# Patient Record
Sex: MALE | Race: WHITE | Employment: FULL TIME | URBAN - METROPOLITAN AREA
[De-identification: names, ages, dates, MRNs, and addresses within clinical notes are randomized per-mention and may not be internally consistent; named-entity substitution may affect disease eponyms.]

---

## 2017-01-20 RX ORDER — LEVOTHYROXINE SODIUM 50 UG/1
50 TABLET ORAL
COMMUNITY
End: 2018-09-01 | Stop reason: SDUPTHER

## 2017-01-20 RX ORDER — ASPIRIN 81 MG/1
81 TABLET ORAL DAILY
Status: ON HOLD | COMMUNITY
End: 2017-01-25

## 2017-01-20 RX ORDER — LORATADINE 10 MG/1
10 TABLET ORAL DAILY
COMMUNITY
End: 2018-12-13 | Stop reason: SDUPTHER

## 2017-01-20 RX ORDER — METHOTREXATE 2.5 MG/1
25 TABLET ORAL
Status: ON HOLD | COMMUNITY
End: 2017-01-25

## 2017-01-20 RX ORDER — FOLIC ACID 1 MG/1
1 TABLET ORAL DAILY
COMMUNITY
End: 2018-05-03 | Stop reason: ALTCHOICE

## 2017-01-20 RX ORDER — RANITIDINE 150 MG/1
150 TABLET, FILM COATED ORAL
COMMUNITY
End: 2018-05-03 | Stop reason: ALTCHOICE

## 2017-01-20 RX ORDER — CHOLECALCIFEROL (VITAMIN D3) 125 MCG
1 CAPSULE ORAL DAILY
COMMUNITY
End: 2018-08-16 | Stop reason: ALTCHOICE

## 2017-01-20 NOTE — PERIOP NOTES
[facility]  Ambulatory Surgery Unit  Pre-operative Instructions    Surgery/Procedure Date  1/25            Tentative Arrival Time tbd      1. On the day of your surgery/procedure, please report to the Ambulatory Surgery Unit Registration Desk and sign in at your designated time. The Ambulatory Surgery Unit is located in [facility] on the [facility] side of the [facility] across from the [facility] building. Please have all of your health insurance cards and a photo ID. 2. You must have someone with you to drive you home, as you should not drive a car for 24 hours following anesthesia. Please make arrangements for a responsible adult friend or family member to stay with you for at least the first 24 hours after your surgery. 3. Do not have anything to eat or drink (including water, gum, mints, coffee, juice) after midnight 1/24. This may not apply to medications prescribed by your physician. (Please note below the special instructions with medications to take the morning of surgery, if applicable.)    4. We recommend you do not drink any alcoholic beverages for 24 hours before and after your surgery. 5. Stop all Aspirin, non-steroidal anti-inflammatory drugs (i.e. Advil, Aleve), vitamins, and supplements as directed by your surgeon's office. **If you are currently taking Plavix, Coumadin, or other blood-thinning agents, contact your surgeon for instructions. **    6. In an effort to help prevent surgical site infection, we ask that you shower with an anti-bacterial soap (i.e. Dial or Safeguard) for 3 days prior to and on the morning of surgery, using a fresh towel after each shower. (Please begin this process with fresh bed linens.) Do not apply any lotions, powders, or deodorants after the shower on the day of your procedure. If applicable, please do not shave the operative site for 48 hours prior to surgery. 7. Wear comfortable clothes. Wear glasses instead of contacts.  Do not bring any jewelry or money (other than copays or fees as instructed). Do not wear make-up, particularly mascara, the morning of your surgery. Do not wear nail polish, particularly if you are having foot /hand surgery. Wear your hair loose or down, no ponytails, buns, nancy pins or clips. All body piercings must be removed. 8. You should understand that if you do not follow these instructions your surgery may be cancelled. If your physical condition changes (i.e. fever, cold or flu) please contact your surgeon as soon as possible. 9. It is important that you be on time. If a situation occurs where you may be late, or if you have any questions or problems, please call [phone].    10. Your surgery time may be subject to change. You will receive a phone call the day prior to surgery to confirm your arrival time. Special Instructions:    MEDICATIONS TO TAKE THE MORNING OF SURGERY WITH A SIP OF WATER: levothyroxine and claritin      I understand a pre-operative phone call will be made to verify my surgery time. In the event that I am not available, I give permission for a message to be left on my answering service and/or with another person? yes    Reviewed by phone with pt. Verbalized understanding. Also left on VM for pt with instructions.      ___________________      ___________________      ________________  (Signature of Patient)          (Witness)                   (Date and Time)

## 2017-01-24 ENCOUNTER — ANESTHESIA EVENT (OUTPATIENT)
Dept: SURGERY | Age: 52
End: 2017-01-24
Payer: COMMERCIAL

## 2017-01-25 ENCOUNTER — ANESTHESIA (OUTPATIENT)
Dept: SURGERY | Age: 52
End: 2017-01-25
Payer: COMMERCIAL

## 2017-01-25 ENCOUNTER — HOSPITAL ENCOUNTER (OUTPATIENT)
Age: 52
Discharge: HOME | End: 2017-01-26
Attending: OTOLARYNGOLOGY | Admitting: OTOLARYNGOLOGY
Payer: COMMERCIAL

## 2017-01-25 PROCEDURE — 74011000250 HC RX REV CODE- 250: Performed by: OTOLARYNGOLOGY

## 2017-01-25 PROCEDURE — 74011000272 HC RX REV CODE- 272: Performed by: OTOLARYNGOLOGY

## 2017-01-25 PROCEDURE — 76030000001 HC AMB SURG OR TIME 1 TO 1.5: Performed by: OTOLARYNGOLOGY

## 2017-01-25 PROCEDURE — 77030012602 HC SPNG PTTY NEUR J&J -B: Performed by: OTOLARYNGOLOGY

## 2017-01-25 PROCEDURE — 77030021668 HC NEB PREFIL KT VYRM -A: Performed by: OTOLARYNGOLOGY

## 2017-01-25 PROCEDURE — 76210000057 HC AMBSU PH II REC 1 TO 1.5 HR: Performed by: OTOLARYNGOLOGY

## 2017-01-25 PROCEDURE — 77030006689 HC BLD OPHTH BVR BD -A: Performed by: OTOLARYNGOLOGY

## 2017-01-25 PROCEDURE — 76210000034 HC AMBSU PH I REC 0.5 TO 1 HR: Performed by: OTOLARYNGOLOGY

## 2017-01-25 PROCEDURE — 77030026438 HC STYL ET INTUB CARD -A: Performed by: NURSE ANESTHETIST, CERTIFIED REGISTERED

## 2017-01-25 PROCEDURE — 77030006907 HC BLD SNUS SHV MEDT -C: Performed by: OTOLARYNGOLOGY

## 2017-01-25 PROCEDURE — 74011250636 HC RX REV CODE- 250/636

## 2017-01-25 PROCEDURE — 74011250637 HC RX REV CODE- 250/637: Performed by: OTOLARYNGOLOGY

## 2017-01-25 PROCEDURE — 74011000250 HC RX REV CODE- 250

## 2017-01-25 PROCEDURE — 77030002888 HC SUT CHRMC J&J -A: Performed by: OTOLARYNGOLOGY

## 2017-01-25 PROCEDURE — 74011250637 HC RX REV CODE- 250/637

## 2017-01-25 PROCEDURE — 77030008684 HC TU ET CUF COVD -B: Performed by: NURSE ANESTHETIST, CERTIFIED REGISTERED

## 2017-01-25 PROCEDURE — 76060000062 HC AMB SURG ANES 1 TO 1.5 HR: Performed by: OTOLARYNGOLOGY

## 2017-01-25 PROCEDURE — 74011250636 HC RX REV CODE- 250/636: Performed by: OTOLARYNGOLOGY

## 2017-01-25 PROCEDURE — 77030020255 HC SOL INJ LR 1000ML BG: Performed by: OTOLARYNGOLOGY

## 2017-01-25 PROCEDURE — 74011250636 HC RX REV CODE- 250/636: Performed by: ANESTHESIOLOGY

## 2017-01-25 PROCEDURE — 77030018836 HC SOL IRR NACL ICUM -A: Performed by: OTOLARYNGOLOGY

## 2017-01-25 PROCEDURE — 77030002974 HC SUT PLN J&J -A: Performed by: OTOLARYNGOLOGY

## 2017-01-25 PROCEDURE — 77010033678 HC OXYGEN DAILY

## 2017-01-25 PROCEDURE — 77030002916 HC SUT ETHLN J&J -A: Performed by: OTOLARYNGOLOGY

## 2017-01-25 PROCEDURE — 74011000258 HC RX REV CODE- 258: Performed by: OTOLARYNGOLOGY

## 2017-01-25 PROCEDURE — 77030013079 HC BLNKT BAIR HGGR 3M -A: Performed by: NURSE ANESTHETIST, CERTIFIED REGISTERED

## 2017-01-25 PROCEDURE — 77030014006 HC SPNG HEMSTAT J&J -A: Performed by: OTOLARYNGOLOGY

## 2017-01-25 PROCEDURE — 77030008356 HC SPLNT NSL INT MEDT -B: Performed by: OTOLARYNGOLOGY

## 2017-01-25 RX ORDER — SODIUM CHLORIDE 0.9 % (FLUSH) 0.9 %
5-10 SYRINGE (ML) INJECTION EVERY 8 HOURS
Status: DISCONTINUED | OUTPATIENT
Start: 2017-01-25 | End: 2017-01-26 | Stop reason: HOSPADM

## 2017-01-25 RX ORDER — OXYCODONE HYDROCHLORIDE 5 MG/1
5 TABLET ORAL
Status: COMPLETED | OUTPATIENT
Start: 2017-01-25 | End: 2017-01-25

## 2017-01-25 RX ORDER — OXYCODONE AND ACETAMINOPHEN 5; 325 MG/1; MG/1
1 TABLET ORAL ONCE
Status: DISCONTINUED | OUTPATIENT
Start: 2017-01-25 | End: 2017-01-25 | Stop reason: HOSPADM

## 2017-01-25 RX ORDER — DEXTROSE MONOHYDRATE AND SODIUM CHLORIDE 5; .45 G/100ML; G/100ML
100 INJECTION, SOLUTION INTRAVENOUS CONTINUOUS
Status: DISCONTINUED | OUTPATIENT
Start: 2017-01-25 | End: 2017-01-26 | Stop reason: HOSPADM

## 2017-01-25 RX ORDER — ROCURONIUM BROMIDE 10 MG/ML
INJECTION, SOLUTION INTRAVENOUS AS NEEDED
Status: DISCONTINUED | OUTPATIENT
Start: 2017-01-25 | End: 2017-01-25 | Stop reason: HOSPADM

## 2017-01-25 RX ORDER — SCOLOPAMINE TRANSDERMAL SYSTEM 1 MG/1
PATCH, EXTENDED RELEASE TRANSDERMAL
Status: DISCONTINUED
Start: 2017-01-25 | End: 2017-01-26 | Stop reason: HOSPADM

## 2017-01-25 RX ORDER — LIDOCAINE HYDROCHLORIDE 20 MG/ML
INJECTION, SOLUTION EPIDURAL; INFILTRATION; INTRACAUDAL; PERINEURAL AS NEEDED
Status: DISCONTINUED | OUTPATIENT
Start: 2017-01-25 | End: 2017-01-25 | Stop reason: HOSPADM

## 2017-01-25 RX ORDER — LIDOCAINE HYDROCHLORIDE AND EPINEPHRINE 10; 10 MG/ML; UG/ML
1.5 INJECTION, SOLUTION INFILTRATION; PERINEURAL ONCE
Status: COMPLETED | OUTPATIENT
Start: 2017-01-25 | End: 2017-01-25

## 2017-01-25 RX ORDER — LIDOCAINE HYDROCHLORIDE 10 MG/ML
0.1 INJECTION, SOLUTION EPIDURAL; INFILTRATION; INTRACAUDAL; PERINEURAL AS NEEDED
Status: DISCONTINUED | OUTPATIENT
Start: 2017-01-25 | End: 2017-01-25 | Stop reason: HOSPADM

## 2017-01-25 RX ORDER — DEXAMETHASONE SODIUM PHOSPHATE 4 MG/ML
10 INJECTION, SOLUTION INTRA-ARTICULAR; INTRALESIONAL; INTRAMUSCULAR; INTRAVENOUS; SOFT TISSUE ONCE
Status: COMPLETED | OUTPATIENT
Start: 2017-01-25 | End: 2017-01-25

## 2017-01-25 RX ORDER — ONDANSETRON 2 MG/ML
INJECTION INTRAMUSCULAR; INTRAVENOUS AS NEEDED
Status: DISCONTINUED | OUTPATIENT
Start: 2017-01-25 | End: 2017-01-25 | Stop reason: HOSPADM

## 2017-01-25 RX ORDER — SCOLOPAMINE TRANSDERMAL SYSTEM 1 MG/1
1.5 PATCH, EXTENDED RELEASE TRANSDERMAL
Status: DISCONTINUED | OUTPATIENT
Start: 2017-01-25 | End: 2017-01-26 | Stop reason: HOSPADM

## 2017-01-25 RX ORDER — SUCCINYLCHOLINE CHLORIDE 20 MG/ML
INJECTION INTRAMUSCULAR; INTRAVENOUS AS NEEDED
Status: DISCONTINUED | OUTPATIENT
Start: 2017-01-25 | End: 2017-01-25 | Stop reason: HOSPADM

## 2017-01-25 RX ORDER — BACITRACIN ZINC 500 UNIT/G
OINTMENT (GRAM) TOPICAL ONCE
Status: COMPLETED | OUTPATIENT
Start: 2017-01-25 | End: 2017-01-25

## 2017-01-25 RX ORDER — NEOSTIGMINE METHYLSULFATE 1 MG/ML
INJECTION INTRAVENOUS AS NEEDED
Status: DISCONTINUED | OUTPATIENT
Start: 2017-01-25 | End: 2017-01-25 | Stop reason: HOSPADM

## 2017-01-25 RX ORDER — OXYMETAZOLINE HCL 0.05 %
3 SPRAY, NON-AEROSOL (ML) NASAL ONCE
Status: COMPLETED | OUTPATIENT
Start: 2017-01-25 | End: 2017-01-25

## 2017-01-25 RX ORDER — LORATADINE 10 MG/1
10 TABLET ORAL DAILY
Status: DISCONTINUED | OUTPATIENT
Start: 2017-01-25 | End: 2017-01-26 | Stop reason: HOSPADM

## 2017-01-25 RX ORDER — SODIUM CHLORIDE 0.9 % (FLUSH) 0.9 %
5-10 SYRINGE (ML) INJECTION AS NEEDED
Status: DISCONTINUED | OUTPATIENT
Start: 2017-01-25 | End: 2017-01-25 | Stop reason: HOSPADM

## 2017-01-25 RX ORDER — SODIUM CHLORIDE, SODIUM LACTATE, POTASSIUM CHLORIDE, CALCIUM CHLORIDE 600; 310; 30; 20 MG/100ML; MG/100ML; MG/100ML; MG/100ML
25 INJECTION, SOLUTION INTRAVENOUS CONTINUOUS
Status: DISCONTINUED | OUTPATIENT
Start: 2017-01-25 | End: 2017-01-25 | Stop reason: HOSPADM

## 2017-01-25 RX ORDER — SODIUM CHLORIDE 0.9 % (FLUSH) 0.9 %
5-10 SYRINGE (ML) INJECTION AS NEEDED
Status: DISCONTINUED | OUTPATIENT
Start: 2017-01-25 | End: 2017-01-26 | Stop reason: HOSPADM

## 2017-01-25 RX ORDER — LEVOTHYROXINE SODIUM 50 UG/1
50 TABLET ORAL
Status: DISCONTINUED | OUTPATIENT
Start: 2017-01-26 | End: 2017-01-26 | Stop reason: HOSPADM

## 2017-01-25 RX ORDER — MORPHINE SULFATE 10 MG/ML
2 INJECTION, SOLUTION INTRAMUSCULAR; INTRAVENOUS
Status: DISCONTINUED | OUTPATIENT
Start: 2017-01-25 | End: 2017-01-25 | Stop reason: HOSPADM

## 2017-01-25 RX ORDER — FENTANYL CITRATE 50 UG/ML
25 INJECTION, SOLUTION INTRAMUSCULAR; INTRAVENOUS
Status: DISCONTINUED | OUTPATIENT
Start: 2017-01-25 | End: 2017-01-25 | Stop reason: HOSPADM

## 2017-01-25 RX ORDER — MELATONIN
2000 DAILY
Status: DISCONTINUED | OUTPATIENT
Start: 2017-01-25 | End: 2017-01-26 | Stop reason: HOSPADM

## 2017-01-25 RX ORDER — OXYCODONE AND ACETAMINOPHEN 5; 325 MG/1; MG/1
1-2 TABLET ORAL
Status: DISCONTINUED | OUTPATIENT
Start: 2017-01-25 | End: 2017-01-26 | Stop reason: HOSPADM

## 2017-01-25 RX ORDER — CEPHALEXIN 250 MG/1
500 CAPSULE ORAL EVERY 6 HOURS
Status: DISCONTINUED | OUTPATIENT
Start: 2017-01-25 | End: 2017-01-26 | Stop reason: HOSPADM

## 2017-01-25 RX ORDER — OXYMETAZOLINE HCL 0.05 %
2 SPRAY, NON-AEROSOL (ML) NASAL ONCE
Status: COMPLETED | OUTPATIENT
Start: 2017-01-25 | End: 2017-01-25

## 2017-01-25 RX ORDER — ACETAMINOPHEN 10 MG/ML
INJECTION, SOLUTION INTRAVENOUS AS NEEDED
Status: DISCONTINUED | OUTPATIENT
Start: 2017-01-25 | End: 2017-01-25 | Stop reason: HOSPADM

## 2017-01-25 RX ORDER — MIDAZOLAM HYDROCHLORIDE 1 MG/ML
INJECTION, SOLUTION INTRAMUSCULAR; INTRAVENOUS AS NEEDED
Status: DISCONTINUED | OUTPATIENT
Start: 2017-01-25 | End: 2017-01-25 | Stop reason: HOSPADM

## 2017-01-25 RX ORDER — HYDROMORPHONE HYDROCHLORIDE 1 MG/ML
.2-.5 INJECTION, SOLUTION INTRAMUSCULAR; INTRAVENOUS; SUBCUTANEOUS ONCE
Status: DISCONTINUED | OUTPATIENT
Start: 2017-01-25 | End: 2017-01-25 | Stop reason: HOSPADM

## 2017-01-25 RX ORDER — SODIUM CHLORIDE 0.9 % (FLUSH) 0.9 %
5-10 SYRINGE (ML) INJECTION EVERY 8 HOURS
Status: DISCONTINUED | OUTPATIENT
Start: 2017-01-25 | End: 2017-01-25 | Stop reason: HOSPADM

## 2017-01-25 RX ORDER — FOLIC ACID 1 MG/1
1 TABLET ORAL DAILY
Status: DISCONTINUED | OUTPATIENT
Start: 2017-01-25 | End: 2017-01-26 | Stop reason: HOSPADM

## 2017-01-25 RX ORDER — FENTANYL CITRATE 50 UG/ML
INJECTION, SOLUTION INTRAMUSCULAR; INTRAVENOUS AS NEEDED
Status: DISCONTINUED | OUTPATIENT
Start: 2017-01-25 | End: 2017-01-25 | Stop reason: HOSPADM

## 2017-01-25 RX ORDER — GLYCOPYRROLATE 0.2 MG/ML
INJECTION INTRAMUSCULAR; INTRAVENOUS AS NEEDED
Status: DISCONTINUED | OUTPATIENT
Start: 2017-01-25 | End: 2017-01-25 | Stop reason: HOSPADM

## 2017-01-25 RX ORDER — DIPHENHYDRAMINE HYDROCHLORIDE 50 MG/ML
12.5 INJECTION, SOLUTION INTRAMUSCULAR; INTRAVENOUS AS NEEDED
Status: DISCONTINUED | OUTPATIENT
Start: 2017-01-25 | End: 2017-01-25 | Stop reason: HOSPADM

## 2017-01-25 RX ORDER — CEFAZOLIN SODIUM IN 0.9 % NACL 2 G/100 ML
2 PLASTIC BAG, INJECTION (ML) INTRAVENOUS ONCE
Status: COMPLETED | OUTPATIENT
Start: 2017-01-25 | End: 2017-01-25

## 2017-01-25 RX ORDER — PROPOFOL 10 MG/ML
INJECTION, EMULSION INTRAVENOUS AS NEEDED
Status: DISCONTINUED | OUTPATIENT
Start: 2017-01-25 | End: 2017-01-25 | Stop reason: HOSPADM

## 2017-01-25 RX ADMIN — ROCURONIUM BROMIDE 20 MG: 10 INJECTION, SOLUTION INTRAVENOUS at 09:42

## 2017-01-25 RX ADMIN — SALINE NASAL SPRAY 4 SPRAY: 1.5 SOLUTION NASAL at 22:24

## 2017-01-25 RX ADMIN — SODIUM CHLORIDE, SODIUM LACTATE, POTASSIUM CHLORIDE, AND CALCIUM CHLORIDE: 600; 310; 30; 20 INJECTION, SOLUTION INTRAVENOUS at 09:47

## 2017-01-25 RX ADMIN — CEPHALEXIN 500 MG: 250 CAPSULE ORAL at 23:49

## 2017-01-25 RX ADMIN — DEXTROSE MONOHYDRATE AND SODIUM CHLORIDE 100 ML/HR: 5; .45 INJECTION, SOLUTION INTRAVENOUS at 14:20

## 2017-01-25 RX ADMIN — CEPHALEXIN 500 MG: 250 CAPSULE ORAL at 14:21

## 2017-01-25 RX ADMIN — FOLIC ACID 1 MG: 1 TABLET ORAL at 14:21

## 2017-01-25 RX ADMIN — ROCURONIUM BROMIDE 5 MG: 10 INJECTION, SOLUTION INTRAVENOUS at 09:34

## 2017-01-25 RX ADMIN — SALINE NASAL SPRAY 4 SPRAY: 1.5 SOLUTION NASAL at 18:54

## 2017-01-25 RX ADMIN — NEOSTIGMINE METHYLSULFATE 3 MG: 1 INJECTION INTRAVENOUS at 10:31

## 2017-01-25 RX ADMIN — Medication 3 SPRAY: at 07:53

## 2017-01-25 RX ADMIN — FENTANYL CITRATE 100 MCG: 50 INJECTION, SOLUTION INTRAMUSCULAR; INTRAVENOUS at 09:34

## 2017-01-25 RX ADMIN — SALINE NASAL SPRAY 4 SPRAY: 1.5 SOLUTION NASAL at 14:30

## 2017-01-25 RX ADMIN — Medication 10 ML: at 22:24

## 2017-01-25 RX ADMIN — DEXAMETHASONE SODIUM PHOSPHATE 10 MG: 4 INJECTION, SOLUTION INTRAMUSCULAR; INTRAVENOUS at 07:54

## 2017-01-25 RX ADMIN — CEPHALEXIN 500 MG: 250 CAPSULE ORAL at 18:53

## 2017-01-25 RX ADMIN — ONDANSETRON 4 MG: 2 INJECTION INTRAMUSCULAR; INTRAVENOUS at 10:22

## 2017-01-25 RX ADMIN — OXYCODONE HYDROCHLORIDE 5 MG: 5 TABLET ORAL at 16:22

## 2017-01-25 RX ADMIN — SALINE NASAL SPRAY 4 SPRAY: 1.5 SOLUTION NASAL at 20:36

## 2017-01-25 RX ADMIN — SALINE NASAL SPRAY 4 SPRAY: 1.5 SOLUTION NASAL at 16:24

## 2017-01-25 RX ADMIN — GLYCOPYRROLATE 0.5 MG: 0.2 INJECTION INTRAMUSCULAR; INTRAVENOUS at 10:31

## 2017-01-25 RX ADMIN — SODIUM CHLORIDE, SODIUM LACTATE, POTASSIUM CHLORIDE, AND CALCIUM CHLORIDE 25 ML/HR: 600; 310; 30; 20 INJECTION, SOLUTION INTRAVENOUS at 07:51

## 2017-01-25 RX ADMIN — VITAMIN D, TAB 1000IU (100/BT) 2000 UNITS: 25 TAB at 14:21

## 2017-01-25 RX ADMIN — ACETAMINOPHEN 1000 MG: 10 INJECTION, SOLUTION INTRAVENOUS at 09:51

## 2017-01-25 RX ADMIN — MIDAZOLAM HYDROCHLORIDE 2 MG: 1 INJECTION, SOLUTION INTRAMUSCULAR; INTRAVENOUS at 09:29

## 2017-01-25 RX ADMIN — PROPOFOL 200 MG: 10 INJECTION, EMULSION INTRAVENOUS at 09:34

## 2017-01-25 RX ADMIN — Medication 10 ML: at 14:22

## 2017-01-25 RX ADMIN — LIDOCAINE HYDROCHLORIDE 60 MG: 20 INJECTION, SOLUTION EPIDURAL; INFILTRATION; INTRACAUDAL; PERINEURAL at 09:34

## 2017-01-25 RX ADMIN — FENTANYL CITRATE 50 MCG: 50 INJECTION, SOLUTION INTRAMUSCULAR; INTRAVENOUS at 10:59

## 2017-01-25 RX ADMIN — OXYCODONE HYDROCHLORIDE 5 MG: 5 TABLET ORAL at 20:36

## 2017-01-25 RX ADMIN — SUCCINYLCHOLINE CHLORIDE 140 MG: 20 INJECTION INTRAMUSCULAR; INTRAVENOUS at 09:34

## 2017-01-25 RX ADMIN — CEFAZOLIN 2 G: 10 INJECTION, POWDER, FOR SOLUTION INTRAVENOUS; PARENTERAL at 09:28

## 2017-01-25 NOTE — PERIOP NOTES
Arrival to PACU. Pt restless at intervals, confused. Frequent reassurance needed. 1056 no change. 1101 less restless. Eyes open to voice. 1111 changed from lynnette hugger gown to [employer] . Pt awake, drowsy, follows commands. Denies complaints. Encouraged Cough, deep breathing. 0 family at bedside. Pt taking po fluids without diff. Denies complaints. [address], Garth Klinefelter,  unable to take report at this time. She will return call. Pt resting quietly, family at bedside. 1215 resting quietly, family at bedside. Pt states his lower back is sore. Repositioned pt. HOB remains elevated. 1232 report to Trevor Moore RN on Gen surg   1300 transported to Gen Surg. On arrival to room, pt ambulated to bathroom with assist to void. Pt denies complaints. Face to face update to PATRIC Chavez RN at bedside. Pt assisted to bed, pt on blow by oxygen. Pt awake, alert and oriented x4.

## 2017-01-25 NOTE — BRIEF OP NOTE
BRIEF OPERATIVE NOTE    Date of Procedure: 1/25/2017   Preoperative Diagnosis: TURBINATE HYPERTROPHY   SEPTAL DEVIATION   Postoperative Diagnosis: TURBINATE HYPERTROPHY   SEPTAL DEVIATION     Procedure(s):  SEPTOPLASTY / TURBINATE REDUCTION   Surgeon(s) and Role:     * Jenelle Booker MD - Primary            Surgical Staff:  Circ-1: Shelley Mosqueda RN  Scrub Tech-1: Nitin Garcia.  Lizzeth  Event Time In   Incision Start 0951   Incision Close 1037     Anesthesia: General   Estimated Blood Loss: 25ml  Specimens: * No specimens in log *   Findings: as expected   Complications: none apparent  Implants: * No implants in log *

## 2017-01-25 NOTE — ANESTHESIA POSTPROCEDURE EVALUATION
Post-Anesthesia Evaluation and Assessment    Patient: Frederick Staton MRN: [id number]  SSN: [id number]    YOB: 1965  Age: 45 y.o. Sex: male       Cardiovascular Function/Vital Signs  Visit Vitals    BP (!) 145/99    Pulse 90    Temp 36.8 °C (98.2 °F)    Resp 17    Ht 5' 10\" (1.778 m)    Wt 103.9 kg (229 lb)    SpO2 96%    BMI 32.86 kg/m2       Patient is status post general, total IV anesthesia anesthesia for Procedure(s):  SEPTOPLASTY / TURBINATE REDUCTION . Nausea/Vomiting: None    Postoperative hydration reviewed and adequate. Pain:  Pain Scale 1: Numeric (0 - 10) (01/25/17 1211)  Pain Intensity 1: 0 (01/25/17 1211)   Managed    Neurological Status:   Neuro (WDL): Within Defined Limits (01/25/17 1141)  Neuro  Neurologic State: Alert (01/25/17 1319)  LUE Motor Response: Tingling;Numbness (chronic in left hand) (01/25/17 0738)   At baseline    Mental Status and Level of Consciousness: Arousable    Pulmonary Status:   O2 Device: Blow by oxygen (01/25/17 1211)   Adequate oxygenation and airway patent    Complications related to anesthesia: None    Post-anesthesia assessment completed.  No concerns    Signed By: Kj Antonio MD     January 25, 2017

## 2017-01-25 NOTE — OP NOTES
[facility]   [address]   OP NOTE       Name:  Edna Maher   MR#:  [id number]   :  1965   Account #:  [de-identified]    Surgery Date:  2017   Date of Adm:  2017       PREOPERATIVE DIAGNOSES    1. Nasal septal deviation. 2. Turbinate hypertrophy. POSTOPERATIVE DIAGNOSES    1. Nasal septal deviation. 2. Turbinate hypertrophy. PROCEDURES PERFORMED    1. Septoplasty. 2. Bilateral inferior turbinate submucosal resection with therapeutic   outfracture. SURGEON: Scooter Garcia. MD Jeremy    ESTIMATED BLOOD LOSS: 25 mL. COMPLICATIONS: None apparent. SPECIMENS REMOVED: None. ANESTHESIA:  GETA    INDICATIONS: The patient is a 70-year-old male with a long history of   chronic nasal airway obstruction which has been refractory to medical   therapy. Additionally, the patient is known to have sleep apnea and   has difficulty using his CPAP device due to nasal airway obstruction. The preoperative fiberoptic examination of the nasal cavity showed a   markedly deviated and obstructive septal deflection to the left side with   hypertrophy of the right inferior turbinate. Preoperatively, the   alternatives, potential benefits, and possible risks of the procedure   were explained to the patient and his family, who understood and   requested to proceed. DESCRIPTION OF PROCEDURE: The patient received 0.25%   oxymetazoline in the pre-anesthesia holding area. The patient was   brought to the operating room, placed supine on the operating table,   and following the smooth induction of general endotracheal tube   anesthesia, the table was turned 90 degrees and the patient was   positioned for operation. Xylocaine 1% with 1:100,000 epinephrine was   injected in a submucoperichondrial and submucoperiosteal fashion   from anterior to posterior. Further oxymetazoline on neurosurgical   cottonoids was placed intranasally.  A routine Betadine prep and drape   was completed. A 6400 Turner blade was used to initiate a left hemitransfixion incision. A submucoperichondrial and submucoperiosteal elevation of   membranes was carried out from anterior to posterior. The   osteocartilaginous junction was identified and  with a caudal   calibrated elevator. The deformed and obstructing portions of posterior   osseous septum were removed with a [place]-Amor bone punch. A spur arising from the maxillary crest was removed with a 4 mm   osteotome. A 2 mm strip of anterior superior quadrangular cartilage   was removed to allow a swinging door return to midline of the planar   portions of quadrangular cartilage. Satisfactory reduction of the nasal   septal constituents was observed. Attention was turned to turbinate   reduction. Using the StraightShot handpiece with a 2 mm turbinate blade   attached, a submucosal resection of the right and left inferior turbinate   soft tissues was carried out. After adequate soft tissue had been   resected, a Boies elevator was used to perform a therapeutic   outfracture of the inferior turbinate on each side. A satisfactory nasal   airway was subsequently observed and attention was turned to   closure. Interrupted 4-0 chromic was used to approximate the margins of the   left hemitransfixion incision. A running 4-0 plain gut suture was used to   approximate septal membranes to the midline. Lindo ventilated splints   were placed and secured with a nylon mattress suture. Bacitracin-  coated Gelfoam was placed on each side of the nasal cavity to further   support the inferior turbinates in a lateralized position. With application   of a sterile nasal dressing, the patient's procedure was concluded. The   patient was aroused from general anesthesia, extubated in the   operating room, and transferred to the recovery area in satisfactory   condition.         Cami Teague MD      [address] [place] [address] / [facility]   D: 01/25/2017   11:00   T:  01/25/2017   13:41   Job #:  [id number]

## 2017-01-25 NOTE — PROGRESS NOTES
End of Shift Nursing Note    Bedside shift change report given to Johnny Donahue (oncoming nurse) by John Andrew RN (offgoing nurse). Report included the following information SBAR and Kardex. Zone Phone:       Significant changes during shift:    none   Non-emergent issues for physician to address:   none     Number times ambulated in hallway past shift: 0      Number of times OOB to chair past shift: 2    POD #:      Vital Signs:    Temp: 98.2 °F (36.8 °C)     Pulse (Heart Rate): 90     BP: (!) 145/99     Resp Rate: 17     O2 Sat (%): 96 %    Lines & Drains:     Urinary Catheter? NO       NG tube [] in [] removed [x] not applicable   Drains [] in [] removed [x] not applicable     Skin Integrity:      Wounds: no   Dressings Present: no    Wound Concerns: no      GI:    Current diet:  DIET REGULAR    Nausea: NO  Vomiting: NO  Bowel Sounds: YES  Flatus: NO  Last Bowel Movement: yesterday   Appearance:     Respiratory:  Supplemental O2: yes    Device: face tent   via 4 Liters/min     Incentive Spirometer: NO  Volume:   Coughing and Deep Breathing: YES  Oral Care: NO  Understanding (patient/family education): YES   Getting out of bed: YES  Head of bed elevation: YES    Patient Safety:    Falls Score: 1  Mobility Score: 1  Bed Alarm On? NO  Sitter? NO      Opportunity for questions and clarification was given to oncoming nurse. Patient bed is in low position, side rails are up x 2, door & observation blinds open as needed, call bell within reach and patient not in distress.     Tia Fregoso RN

## 2017-01-25 NOTE — PERIOP NOTES
TRANSFER - OUT REPORT:    Verbal report given to PATRIC Ceja RN on [employer]  being transferred to gen surg(unit) for routine progression of care       Report consisted of patients Situation, Background, Assessment and   Recommendations(SBAR). Information from the following report(s) SBAR, OR Summary, Procedure Summary, Intake/Output and MAR was reviewed with the receiving nurse. Opportunity for questions and clarification was provided.       Patient transported with:   O2 @ blow by layos with rn

## 2017-01-25 NOTE — PERIOP NOTES
Go Decree  1965  [id number]    Situation:  Verbal report given from: timothy caldwell and esperanza kaur   Procedure: Procedure(s):  SEPTOPLASTY / TURBINATE REDUCTION     Background:    Preoperative diagnosis: TURBINATE HYPERTROPHY   SEPTAL DEVIATION     Postoperative diagnosis: [address]     :  Dr. Sky Duong    Assistant(s): Circ-1: Lora Pappas RN  Scrub Tech-1: Heidi Moreau    Specimens: * No specimens in log *    Assessment:  Intra-procedure medications         Anesthesia gave intra-procedure sedation and medications, see anesthesia flow sheet     Intravenous fluids: LR@ KVO     Vital signs stable       Recommendation:    Permission to share finding with family or friend yes (given preop)

## 2017-01-25 NOTE — DISCHARGE INSTRUCTIONS
[facility] ASSOC[facility]ATESBEE. Anastasia De La Torre M.D., Ph.D., F.A.C.S. Fabian Meyersu, [address] [facility]  [phone]    POST OPERATIVE INSTRUCTIONS-SINUS SURGERY/SEPTOPLASTY    The following instructions are designed to help you recover from surgery as easily as possible. Taking care of yourself can prevent complications. It is very important that you read this sheet often and follow the instructions carefully while you are at home. Your doctor or nurse will be happy to answer any questions. 1. DIET:  You may resume your normal diet in 24 hours. We suggest nothing heavy or greasy and avoid dairy products the first 24 hours. It is important to remember that good overall diet and health promotes healing. 2.  ACTIVITY RESTRICTIONS:  The following guidelines should be followed until your doctor tells you otherwise:   Do not blow your nose, sniff and if you have to sneeze or cough-do with mouth open   Do not lift anything over five pounds.  Do not bend over. Avoid doing things like tying your shoes or picking up things off the floor.  Do not do heavy exercise or play contact sports   Do not strain for a bowel movement. If you are constipated, take a stool softener or a gentle laxative.  Go back to work or school in one week. 3.  HOW TO TAKE CARE OF YOUR NOSE AND SINUSES:  Take the antibiotic prescribed by your doctor. Call if you have any problems or questions. We expect bloody drainage from your nose. You are to change the dressing below your nose as needed and expect to do this 10 to 12 times the first day. We want the drainage to come forward and not down your throat. We suggest you rest and sleep elevated on several pillows on your side. You will have less drainage each day and the swelling will reduce in several days. Call the office if you have bloody saturated gauze more than once an hour.     Clean the nasal openings twice daily with a Q-tip soaked in hydrogen peroxide until clear of all crusts. Take the pain medication prescribed by your doctor as needed for discomfort. No Aspirin, Motrin, Alleve, Advil or similar products for 3 weeks. You make take Tylenol (Acetaminophen) when you no longer need prescribed medication. Do not take Tylenol on top of pain medication because Tylenol is in the pain medication. Avoid smoke, dust, fumes or anything else that might irritate the nose. Protect yourself from any unexpected injury to your nose or face, such as being hit by small children or a restless bedmate. You may find that a cool mist room humidifier is helpful in decreasing the amount of dryness in your nose and mouth. After surgery you should use a nasal saline spray such as Ayr or Simply Saline. Use 4 sprays in each nostril every two hours while awake to help prevent crusts from forming in your nose. 4. RETURN APPOINTMENT:   You need to make a follow-up appointment with your doctor in 5 days. At this time your nose will be gently and carefully examined and your packing will be removed. 5. NOTIFY YOUR DOCTOR IF YOU HAVE:    A sudden increase in the amount of bleeding from the nose   A fever above 101 degrees F, which persists despite increasing the amount of fluids you take. A person with a fever should try to drink one cup of water each waking hour.  Persistent sharp pain that is not relieved by the pain medication you receive.  Increased swelling or redness of your nose.     If you have any questions or concerns following your surgery do not hesitate to contact our office at [phone]

## 2017-01-25 NOTE — ANESTHESIA PREPROCEDURE EVALUATION
Anesthetic History   No history of anesthetic complications            Review of Systems / Medical History  Patient summary reviewed, nursing notes reviewed and pertinent labs reviewed    Pulmonary        Sleep apnea: CPAP           Neuro/Psych   Within defined limits           Cardiovascular  Within defined limits                Exercise tolerance: >4 METS  Comments: BP runs high; no med   GI/Hepatic/Renal  Within defined limits              Endo/Other      Hypothyroidism  Arthritis (Rheumatoid)     Other Findings   Comments: DDD, lumbar         Physical Exam    Airway  Mallampati: I  TM Distance: > 6 cm  Neck ROM: normal range of motion   Mouth opening: Normal     Cardiovascular    Rhythm: regular  Rate: normal      Pertinent negatives: No murmur   Dental  No notable dental hx       Pulmonary  Breath sounds clear to auscultation               Abdominal  GI exam deferred       Other Findings            Anesthetic Plan    ASA: 2  Anesthesia type: general and total IV anesthesia          Induction: Intravenous  Anesthetic plan and risks discussed with: Patient      Scopolamine patch for h/o severe motion sickness

## 2017-01-25 NOTE — IP AVS SNAPSHOT
[address] [facility] 
[phone] Patient: Ebenezer Light MRN: [id number] ITY:[id number] You are allergic to the following Allergen Reactions Sulfa (Sulfonamide Antibiotics) Nausea and Vomiting Recent Documentation Height Weight BMI Smoking Status 1.778 m 103.9 kg 32.86 kg/m2 Never Smoker Emergency Contacts Name Discharge Info Relation Home Work Mobile May Charles  Spouse [3]   [phone] About your hospitalization You were admitted on:  January 25, 2017 You last received care in the:  [facility] 2 GENERAL SURGERY You were discharged on:  January 26, 2017 Unit phone number:  [phone] Why you were hospitalized Your primary diagnosis was:  Not on File Providers Seen During Your Hospitalizations Provider Role Specialty Primary office phone Bren Goyal MD Attending Provider Otolaryngology [phone] Your Primary Care Physician (PCP) Primary Care Physician Office Phone Office Fax Micaela [place] [phone] Follow-up Information Follow up With Details Comments Contact Info MD Vladimir Diaz [address] [place] [facility] 
[phone] Current Discharge Medication List  
  
CONTINUE these medications which have NOT CHANGED Dose & Instructions Dispensing Information Comments Morning Noon Evening Bedtime CLARITIN 10 mg tablet Generic drug:  loratadine Your next dose is: Today, Tomorrow Other:  _________ Dose:  10 mg Take 10 mg by mouth daily. Refills:  0  
     
   
   
   
  
 folic acid 1 mg tablet Commonly known as:  Google Your next dose is: Today, Tomorrow Other:  _________ Dose:  1 mg Take 1 mg by mouth daily. Refills:  0 levothyroxine 50 mcg tablet Commonly known as:  SYNTHROID Your next dose is: Today, Tomorrow Other:  _________ Dose:  50 mcg Take 50 mcg by mouth Daily (before breakfast). Indications: hypothyroidism Refills:  0  
     
   
   
   
  
 VITAMIN D3 2,000 unit Tab Generic drug:  cholecalciferol (vitamin D3) Your next dose is: Today, Tomorrow Other:  _________ Dose:  1 Tab Take 1 Tab by mouth daily. Refills:  0  
     
   
   
   
  
 ZANTAC 150 mg tablet Generic drug:  raNITIdine Your next dose is: Today, Tomorrow Other:  _________ Dose:  150 mg Take 150 mg by mouth Every Friday. Refills:  0 ASK your doctor about these medications Dose & Instructions Dispensing Information Comments Morning Noon Evening Bedtime  
 aspirin delayed-release 81 mg tablet Your next dose is: Today, Tomorrow Other:  _________ Dose:  81 mg Take 81 mg by mouth daily. Refills:  0  
     
   
   
   
  
 methotrexate 2.5 mg tablet Commonly known as:  Pieter Jarred Your next dose is: Today, Tomorrow Other:  _________ Dose:  25 mg Take 25 mg by mouth Every Friday. Refills:  0 Discharge Instructions [facility] Manzano. Sebastian Gillis M.D., Ph.D., F.A.C.S. Otolaryngology [address] [place] [address] 2[address] [facility], [address] [place] [address] 
[phone] POST OPERATIVE INSTRUCTIONS-SINUS SURGERY/SEPTOPLASTY The following instructions are designed to help you recover from surgery as easily as possible. Taking care of yourself can prevent complications. It is very important that you read this sheet often and follow the instructions carefully while you are at home. Your doctor or nurse will be happy to answer any questions. 1.  DIET: 
 You may resume your normal diet in 24 hours. We suggest nothing heavy or greasy and avoid dairy products the first 24 hours. It is important to remember that good overall diet and health promotes healing. 2.  ACTIVITY RESTRICTIONS: 
The following guidelines should be followed until your doctor tells you otherwise: ? Do not blow your nose, sniff and if you have to sneeze or cough-do with mouth open ? Do not lift anything over five pounds. ? Do not bend over. Avoid doing things like tying your shoes or picking up things off the floor. ? Do not do heavy exercise or play contact sports ? Do not strain for a bowel movement. If you are constipated, take a stool softener or a gentle laxative. ? Go back to work or school in one week. 3.  HOW TO TAKE CARE OF YOUR NOSE AND SINUSES: 
Take the antibiotic prescribed by your doctor. Call if you have any problems or questions. We expect bloody drainage from your nose. You are to change the dressing below your nose as needed and expect to do this 10 to 12 times the first day. We want the drainage to come forward and not down your throat. We suggest you rest and sleep elevated on several pillows on your side. You will have less drainage each day and the swelling will reduce in several days. Call the office if you have bloody saturated gauze more than once an hour. Clean the nasal openings twice daily with a Q-tip soaked in hydrogen peroxide until clear of all crusts. Take the pain medication prescribed by your doctor as needed for discomfort. No Aspirin, Motrin, Alleve, Advil or similar products for 3 weeks. You make take Tylenol (Acetaminophen) when you no longer need prescribed medication. Do not take Tylenol on top of pain medication because Tylenol is in the pain medication. Avoid smoke, dust, fumes or anything else that might irritate the nose.  
 
Protect yourself from any unexpected injury to your nose or face, such as being hit by small children or a restless bedmate. You may find that a cool mist room humidifier is helpful in decreasing the amount of dryness in your nose and mouth. After surgery you should use a nasal saline spray such as Ayr or Simply Saline. Use 4 sprays in each nostril every two hours while awake to help prevent crusts from forming in your nose. 4. RETURN APPOINTMENT:  
You need to make a follow-up appointment with your doctor in 5 days. At this time your nose will be gently and carefully examined and your packing will be removed. 5. NOTIFY YOUR DOCTOR IF YOU HAVE:  
? A sudden increase in the amount of bleeding from the nose ? A fever above 101 degrees F, which persists despite increasing the amount of fluids you take. A person with a fever should try to drink one cup of water each waking hour. ? Persistent sharp pain that is not relieved by the pain medication you receive. ? Increased swelling or redness of your nose. If you have any questions or concerns following your surgery do not hesitate to contact our office at [phone] Discharge Orders None Introducing [facility]! Alejandro Mera introduces [employer] patient portal. Now you can access parts of your medical record, email your doctor's office, and request medication refills online. 1. In your internet browser, go to [email or web address] 2. Click on the First Time User? Click Here link in the Sign In box. You will see the New Member Sign Up page. 3. Enter your [employer] Access Code exactly as it appears below. You will not need to use this code after youve completed the sign-up process. If you do not sign up before the expiration date, you must request a new code. · [employer] Access Code: [email or web address] Expires: 4/12/2017 10:31 AM 
 
4. Enter the last four digits of your Social Security Number (xxxx) and Date of Birth (mm/dd/yyyy) as indicated and click Submit.  You will be taken to the next sign-up page. 5. Create a [employer] ID. This will be your [employer] login ID and cannot be changed, so think of one that is secure and easy to remember. 6. Create a [employer] password. You can change your password at any time. 7. Enter your Password Reset Question and Answer. This can be used at a later time if you forget your password. 8. Enter your e-mail address. You will receive e-mail notification when new information is available in [address] 9. Click Sign Up. You can now view and download portions of your medical record. 10. Click the Download Summary menu link to download a portable copy of your medical information. If you have questions, please visit the Frequently Asked Questions section of the [employer] website. Remember, [employer] is NOT to be used for urgent needs. For medical emergencies, dial 911. Now available from your iPhone and Android! General Information Please provide this summary of care documentation to your next provider. Patient Signature:  ____________________________________________________________ Date:  ____________________________________________________________  
  
Qian Car Provider Signature:  ____________________________________________________________ Date:  ____________________________________________________________

## 2017-01-26 VITALS
DIASTOLIC BLOOD PRESSURE: 83 MMHG | OXYGEN SATURATION: 94 % | TEMPERATURE: 97.8 F | BODY MASS INDEX: 32.78 KG/M2 | SYSTOLIC BLOOD PRESSURE: 145 MMHG | HEIGHT: 70 IN | WEIGHT: 229 LBS | RESPIRATION RATE: 16 BRPM | HEART RATE: 79 BPM

## 2017-01-26 PROCEDURE — 74011000258 HC RX REV CODE- 258: Performed by: OTOLARYNGOLOGY

## 2017-01-26 PROCEDURE — 74011250637 HC RX REV CODE- 250/637: Performed by: OTOLARYNGOLOGY

## 2017-01-26 RX ADMIN — LORATADINE 10 MG: 10 TABLET ORAL at 08:06

## 2017-01-26 RX ADMIN — SALINE NASAL SPRAY 4 SPRAY: 1.5 SOLUTION NASAL at 06:05

## 2017-01-26 RX ADMIN — CEPHALEXIN 500 MG: 250 CAPSULE ORAL at 06:11

## 2017-01-26 RX ADMIN — DEXTROSE MONOHYDRATE AND SODIUM CHLORIDE 100 ML/HR: 5; .45 INJECTION, SOLUTION INTRAVENOUS at 00:44

## 2017-01-26 RX ADMIN — LEVOTHYROXINE SODIUM 50 MCG: 50 TABLET ORAL at 08:06

## 2017-01-26 NOTE — PROGRESS NOTES
End of Shift Nursing Note    Bedside shift change report given to Geovani (oncoming nurse) by Joey Sapp (offgoing nurse). Report included the following information SBAR. Zone Phone:       Significant changes during shift:    none   Non-emergent issues for physician to address:   none     Number times ambulated in hallway past shift:  0     Number of times OOB to chair past shift: 1    POD #: 1     Vital Signs:    Temp: 99.3 °F (37.4 °C)     Pulse (Heart Rate): (!) 109     BP: 125/80     Resp Rate: 18     O2 Sat (%): 92 %    Lines & Drains:     Urinary Catheter? NO   Central Line? NO   PICC Line? NO       NG tube [] in [] removed [x] not applicable   Drains [] in [] removed [x] not applicable     Skin Integrity:      Wounds: per nose  Dressings Present: yes  Wound Concerns: no      GI:    Current diet:  DIET REGULAR    Nausea: NO  Vomiting: NO  Bowel Sounds: YES  Flatus: YES  Last Bowel Movement: yesterday   Appearance:     Respiratory:  Supplemental O2: YES      Device: mask   via 4 Liters/min     Incentive Spirometer: NO  Volume:   Coughing and Deep Breathing: YES  Oral Care: YES  Understanding (patient/family education): YES   Getting out of bed: YES  Head of bed elevation: YES    Patient Safety:    Falls Score: 1  Mobility Score: 4  Bed Alarm On? NO  Sitter? YES      Opportunity for questions and clarification was given to oncoming nurse. Patient bed is in low position, side rails are up x 2, door & observation blinds open as needed, call bell within reach and patient not in distress.     Carlos Johnson

## 2017-07-20 ENCOUNTER — TELEPHONE (OUTPATIENT)
Dept: INTERNAL MEDICINE CLINIC | Age: 52
End: 2017-07-20

## 2018-03-18 PROBLEM — E03.9 ACQUIRED HYPOTHYROIDISM: Status: ACTIVE | Noted: 2018-03-18

## 2018-03-19 ENCOUNTER — OFFICE VISIT (OUTPATIENT)
Dept: INTERNAL MEDICINE CLINIC | Age: 53
End: 2018-03-19

## 2018-03-19 VITALS
BODY MASS INDEX: 30.49 KG/M2 | SYSTOLIC BLOOD PRESSURE: 174 MMHG | HEIGHT: 70 IN | OXYGEN SATURATION: 98 % | WEIGHT: 213 LBS | TEMPERATURE: 98.6 F | HEART RATE: 68 BPM | DIASTOLIC BLOOD PRESSURE: 106 MMHG | RESPIRATION RATE: 16 BRPM

## 2018-03-19 DIAGNOSIS — I10 ESSENTIAL HYPERTENSION: Primary | ICD-10-CM

## 2018-03-19 PROBLEM — M05.79 RHEUMATOID ARTHRITIS INVOLVING MULTIPLE SITES WITH POSITIVE RHEUMATOID FACTOR (HCC): Status: ACTIVE | Noted: 2018-03-19

## 2018-03-19 RX ORDER — LISINOPRIL 10 MG/1
10 TABLET ORAL DAILY
Qty: 30 TAB | Refills: 2 | Status: SHIPPED | OUTPATIENT
Start: 2018-03-19 | End: 2018-04-06 | Stop reason: ALTCHOICE

## 2018-03-19 NOTE — PROGRESS NOTES
No chief complaint on file. SUBJECTIVE:    Qiana Slater is a 47 y.o. male who presents today for evaluation of elevated blood pressure. He has had his blood pressure elevated several times at his rheumatologist and he decided to come in for evaluation. He previously had blood pressure elevation when he last saw me about 2 years ago which time I recommend a medication however he did not start medicine has not been back to see me since that time. He does note that he has no headaches. He notes he is also has lost about 30 pounds trying to lose and plan to get 30 pounds more down. He does take Enbrel for his rheumatoid arthritis. He denies any chest pain, shortness breath, palpitations or cardiorespiratory complaints. He denies any GI or  complaint. There are no headaches or neurological planes. He has no nosebleeds. His rheumatoid arthritis is under control on his current therapy. Current Outpatient Prescriptions   Medication Sig Dispense Refill    lisinopril (PRINIVIL, ZESTRIL) 10 mg tablet Take 1 Tab by mouth daily. 30 Tab 2    loratadine (CLARITIN) 10 mg tablet Take 10 mg by mouth daily.  levothyroxine (SYNTHROID) 50 mcg tablet Take 50 mcg by mouth Daily (before breakfast). Indications: hypothyroidism      folic acid (FOLVITE) 1 mg tablet Take 1 mg by mouth daily.  cholecalciferol, vitamin D3, (VITAMIN D3) 2,000 unit tab Take 1 Tab by mouth daily.  raNITIdine (ZANTAC) 150 mg tablet Take 150 mg by mouth Every Friday. Past Medical History:   Diagnosis Date    DDD (degenerative disc disease), lumbar     Elevated BP     1/20/17 on no meds at this time    Hypothyroid     MARYAN on CPAP     RA (rheumatoid arthritis) ([facility] [address]      History reviewed. No pertinent surgical history.   Allergies   Allergen Reactions    Sulfa (Sulfonamide Antibiotics) Nausea and Vomiting       REVIEW OF SYSTEMS:  General: negative for - chills or fever, or weight loss or gain  ENT: negative for - headaches, nasal congestion or tinnitus  Eyes: no blurred or visual changes  Neck: No stiffness or swollen nodes  Respiratory: negative for - cough, hemoptysis, shortness of breath or wheezing  Cardiovascular : negative for - chest pain, edema, palpitations or shortness of breath  Gastrointestinal: negative for - abdominal pain, blood in stools, heartburn or nausea/vomiting  Genito-Urinary: no dysuria, trouble voiding, or hematuria  Musculoskeletal: negative for - gait disturbance, joint pain, joint stiffness or joint swelling  Neurological: no TIA or stroke symptoms  Hematologic: no bruises, no bleeding  Lymphatic: no swollen glands  Integument: no lumps, mole changes, nail changes or rash  Endocrine:no malaise/lethargy poly uria or polydipsia or unexpected weight changes        Social History     Social History    Marital status:      Spouse name: N/A    Number of children: N/A    Years of education: N/A     Social History Main Topics    Smoking status: Never Smoker    Smokeless tobacco: Never Used    Alcohol use No    Drug use: No    Sexual activity: Not Asked     Other Topics Concern    None     Social History Narrative     History reviewed. No pertinent family history. OBJECTIVE:     Visit Vitals    BP (!) 174/106 (BP 1 Location: Left arm, BP Patient Position: Sitting)    Pulse 68    Temp 98.6 °F (37 °C)    Resp 16    Ht 5' 10\" (1.778 m)    Wt 213 lb (96.6 kg)    SpO2 98%    BMI 30.56 kg/m2     CONSTITUTIONAL:   well nourished, appears age appropriate  EYES: sclera anicteric, PERRL, EOMI  ENMT:nares clear, moist mucous membranes, pharynx clear  NECK: supple.  Thyroid normal, No JVD or bruits  RESPIRATORY: Chest: clear to ascultation and percussion, normal inspiratory effort  CARDIOVASCULAR: Heart: regular rate and rhythm no murmurs, rubs or gallops, PMI not displaced, No thrills  GASTROINTESTINAL: Abdomen: non distended, soft, non tender, bowel sounds normal  HEMATOLOGIC: no purpura, petechiae or bruising  LYMPHATIC: No lymph node enlargemant  MUSCULOSKELETAL: Extremities: no edema or active synovitis, pulse 1+   INTEGUMENT: No unusual rashes or suspicious skin lesions noted. Nails appear normal.  PERIPHERAL VASCULAR: normal pulses femoral, PT and DP  NEUROLOGIC: non-focal exam, A & O X 3  PSYCHIATRIC:, appropriate affect     ASSESSMENT:   1. Essential hypertension      Impression  1. Accelerated hypertension at this point I will start him on lisinopril 10 mg daily and recheck him again in about 1 month at which time a 1 to come in fasting for complete checkup since he has not had one for some time. PLAN:  .  Orders Placed This Encounter    lisinopril (PRINIVIL, ZESTRIL) 10 mg tablet         ATTENTION:   This medical record was transcribed using an electronic medical records system. Although proofread, it may and can contain electronic and spelling errors. Other human spelling and other errors may be present. Corrections may be executed at a later time. Please feel free to contact us for any clarifications as needed. Follow-up Disposition:  Return in about 4 weeks (around 4/16/2018). No results found for any visits on 03/19/18. Sierra Barriga MD    The patient verbalized understanding of the problems and plans as explained.

## 2018-03-19 NOTE — MR AVS SNAPSHOT
[address] [facility] 
 
 
 [address] [address] [phone] Patient: Marlene Ray MRN: [id number] OPI:[id number]/[id number] Visit Information Date & Time Provider Department Dept. Phone Encounter #  
 3/19/2018  3:50 PM Kavita Santana Sejal Tirado [id number] Follow-up Instructions Return in about 4 weeks (around 4/16/2018). Follow-up and Disposition History Your Appointments 4/12/2018 10:40 AM  
FOLLOW UP 10 with MD JEOVANY Morley [facility] ([address]) Appt Note: 1 month follow up  labs; 1 month follow up   labs [address] [address] . [facility] [address] Upcoming Health Maintenance Date Due Hepatitis C Screening 1965 COLONOSCOPY 9/19/1982 DTaP/Tdap/Td series (1 - Tdap) 9/19/1985 Allergies as of 3/19/2018  Review Complete On: 3/19/2018 By: Kavita Santana MD  
  
 Severity Noted Reaction Type Reactions Sulfa (Sulfonamide Antibiotics)  01/04/2012    Nausea and Vomiting Current Immunizations  Never Reviewed Name Date Influenza Vaccine 10/1/2016 Not reviewed this visit You Were Diagnosed With   
  
 Codes Comments Essential hypertension    -  Primary ICD-10-CM: I10 
ICD-9-CM: 401.9 Vitals BP Pulse Temp Resp Height(growth percentile) Weight(growth percentile) (!) 174/106 (BP 1 Location: Left arm, BP Patient Position: Sitting) 68 98.6 °F (37 °C) 16 5' 10\" (1.778 m) 213 lb (96.6 kg) SpO2 BMI Smoking Status 98% 30.56 kg/m2 Never Smoker BMI and BSA Data Body Mass Index Body Surface Area 30.56 kg/m 2 2.18 m 2 Preferred Pharmacy Pharmacy Name Phone [facility]Y #[id number] [facility] [phone] Your Updated Medication List  
  
   
This list is accurate as of 3/19/18  5:13 PM.  Always use your most recent med list.  
  
  
  
  
 CLARITIN 10 mg tablet Generic drug:  loratadine Take 10 mg by mouth daily. folic acid 1 mg tablet Commonly known as:  Google Take 1 mg by mouth daily. levothyroxine 50 mcg tablet Commonly known as:  SYNTHROID Take 50 mcg by mouth Daily (before breakfast). Indications: hypothyroidism  
  
 lisinopril 10 mg tablet Commonly known as:  Mccarthy Boor Take 1 Tab by mouth daily. VITAMIN D3 2,000 unit Tab Generic drug:  cholecalciferol (vitamin D3) Take 1 Tab by mouth daily. ZANTAC 150 mg tablet Generic drug:  raNITIdine Take 150 mg by mouth Every Friday. Prescriptions Sent to Pharmacy Refills  
 lisinopril (PRINIVIL, ZESTRIL) 10 mg tablet 2 Sig: Take 1 Tab by mouth daily. Class: Normal  
 Pharmacy: [address] #: [phone] Route: Oral  
  
Follow-up Instructions Return in about 4 weeks (around 4/16/2018). Patient Instructions High Blood Pressure: Care Instructions Your Care Instructions If your blood pressure is usually above 140/90, you have high blood pressure, or hypertension. That means the top number is 140 or higher or the bottom number is 90 or higher, or both. Despite what a lot of people think, high blood pressure usually doesn't cause headaches or make you feel dizzy or lightheaded. It usually has no symptoms. But it does increase your risk for heart attack, stroke, and kidney or eye damage. The higher your blood pressure, the more your risk increases. Your doctor will give you a goal for your blood pressure. Your goal will be based on your health and your age. An example of a goal is to keep your blood pressure below 140/90. Lifestyle changes, such as eating healthy and being active, are always important to help lower blood pressure. You might also take medicine to reach your blood pressure goal. 
Follow-up care is a key part of your treatment and safety. Be sure to make and go to all appointments, and call your doctor if you are having problems. It's also a good idea to know your test results and keep a list of the medicines you take. How can you care for yourself at home? Medical treatment · If you stop taking your medicine, your blood pressure will go back up. You may take one or more types of medicine to lower your blood pressure. Be safe with medicines. Take your medicine exactly as prescribed. Call your doctor if you think you are having a problem with your medicine. · Talk to your doctor before you start taking aspirin every day. Aspirin can help certain people lower their risk of a heart attack or stroke. But taking aspirin isn't right for everyone, because it can cause serious bleeding. · See your doctor regularly. You may need to see the doctor more often at first or until your blood pressure comes down. · If you are taking blood pressure medicine, talk to your doctor before you take decongestants or anti-inflammatory medicine, such as ibuprofen. Some of these medicines can raise blood pressure. · Learn how to check your blood pressure at home. Lifestyle changes · Stay at a healthy weight. This is especially important if you put on weight around the waist. Losing even 10 pounds can help you lower your blood pressure. · If your doctor recommends it, get more exercise. Walking is a good choice. Bit by bit, increase the amount you walk every day. Try for at least 30 minutes on most days of the week. You also may want to swim, bike, or do other activities. · Avoid or limit alcohol. Talk to your doctor about whether you can drink any alcohol.  
· Try to limit how much sodium you eat to less than 2,300 milligrams (mg) a day. Your doctor may ask you to try to eat less than 1,500 mg a day. · Eat plenty of fruits (such as bananas and oranges), vegetables, legumes, whole grains, and low-fat dairy products. · Lower the amount of saturated fat in your diet. Saturated fat is found in animal products such as milk, cheese, and meat. Limiting these foods may help you lose weight and also lower your risk for heart disease. · Do not smoke. Smoking increases your risk for heart attack and stroke. If you need help quitting, talk to your doctor about stop-smoking programs and medicines. These can increase your chances of quitting for good. When should you call for help? Call 911 anytime you think you may need emergency care. This may mean having symptoms that suggest that your blood pressure is causing a serious heart or blood vessel problem. Your blood pressure may be over 180/110. ? For example, call 911 if: 
? · You have symptoms of a heart attack. These may include: ¨ Chest pain or pressure, or a strange feeling in the chest. 
¨ Sweating. ¨ Shortness of breath. ¨ Nausea or vomiting. ¨ Pain, pressure, or a strange feeling in the back, neck, jaw, or upper belly or in one or both shoulders or arms. ¨ Lightheadedness or sudden weakness. ¨ A fast or irregular heartbeat. ? · You have symptoms of a stroke. These may include: 
¨ Sudden numbness, tingling, weakness, or loss of movement in your face, arm, or leg, especially on only one side of your body. ¨ Sudden vision changes. ¨ Sudden trouble speaking. ¨ Sudden confusion or trouble understanding simple statements. ¨ Sudden problems with walking or balance. ¨ A sudden, severe headache that is different from past headaches. ? · You have severe back or belly pain. ?Do not wait until your blood pressure comes down on its own. Get help right away. ?Call your doctor now or seek immediate care if: 
? · Your blood pressure is much higher than normal (such as 180/110 or higher), but you don't have symptoms. ? · You think high blood pressure is causing symptoms, such as: ¨ Severe headache. ¨ Blurry vision. ? Watch closely for changes in your health, and be sure to contact your doctor if: 
? · Your blood pressure measures 140/90 or higher at least 2 times. That means the top number is 140 or higher or the bottom number is 90 or higher, or both. ? · You think you may be having side effects from your blood pressure medicine. ? · Your blood pressure is usually normal, but it goes above normal at least 2 times. Where can you learn more? Go to http://garry-aurora.info/. Enter X284 in the search box to learn more about \"High Blood Pressure: Care Instructions. \" Current as of: September 21, 2016 Content Version: 11.4 © 3837-3576 [employer]. Care instructions adapted under license by [employer] (which disclaims liability or warranty for this information). If you have questions about a medical condition or this instruction, always ask your healthcare professional. [place] [address] any warranty or liability for your use of this information. Patient Instructions History Introducing [facility] & HEALTH SERVICES! Marilynn Covarrubias introduces Omgili patient portal. Now you can access parts of your medical record, email your doctor's office, and request medication refills online. 1. In your internet browser, go to [email or web address] 2. Click on the First Time User? Click Here link in the Sign In box. You will see the New Member Sign Up page. 3. Enter your Omgili Access Code exactly as it appears below. You will not need to use this code after youve completed the sign-up process. If you do not sign up before the expiration date, you must request a new code. · Omgili Access Code: [email or web address] Expires: 6/17/2018  3:38 PM 
 
 4. Enter the last four digits of your Social Security Number (xxxx) and Date of Birth (mm/dd/yyyy) as indicated and click Submit. You will be taken to the next sign-up page. 5. Create a infibond ID. This will be your infibond login ID and cannot be changed, so think of one that is secure and easy to remember. 6. Create a infibond password. You can change your password at any time. 7. Enter your Password Reset Question and Answer. This can be used at a later time if you forget your password. 8. Enter your e-mail address. You will receive e-mail notification when new information is available in [address] 9. Click Sign Up. You can now view and download portions of your medical record. 10. Click the Download Summary menu link to download a portable copy of your medical information. If you have questions, please visit the Frequently Asked Questions section of the infibond website. Remember, infibond is NOT to be used for urgent needs. For medical emergencies, dial 911. Now available from your iPhone and Android! Please provide this summary of care documentation to your next provider. Your primary care clinician is listed as Ludmila. If you have any questions after today's visit, please call [phone].

## 2018-03-19 NOTE — PATIENT INSTRUCTIONS
High Blood Pressure: Care Instructions  Your Care Instructions    If your blood pressure is usually above 140/90, you have high blood pressure, or hypertension. That means the top number is 140 or higher or the bottom number is 90 or higher, or both. Despite what a lot of people think, high blood pressure usually doesn't cause headaches or make you feel dizzy or lightheaded. It usually has no symptoms. But it does increase your risk for heart attack, stroke, and kidney or eye damage. The higher your blood pressure, the more your risk increases. Your doctor will give you a goal for your blood pressure. Your goal will be based on your health and your age. An example of a goal is to keep your blood pressure below 140/90. Lifestyle changes, such as eating healthy and being active, are always important to help lower blood pressure. You might also take medicine to reach your blood pressure goal.  Follow-up care is a key part of your treatment and safety. Be sure to make and go to all appointments, and call your doctor if you are having problems. It's also a good idea to know your test results and keep a list of the medicines you take. How can you care for yourself at home? Medical treatment  · If you stop taking your medicine, your blood pressure will go back up. You may take one or more types of medicine to lower your blood pressure. Be safe with medicines. Take your medicine exactly as prescribed. Call your doctor if you think you are having a problem with your medicine. · Talk to your doctor before you start taking aspirin every day. Aspirin can help certain people lower their risk of a heart attack or stroke. But taking aspirin isn't right for everyone, because it can cause serious bleeding. · See your doctor regularly. You may need to see the doctor more often at first or until your blood pressure comes down.   · If you are taking blood pressure medicine, talk to your doctor before you take decongestants or anti-inflammatory medicine, such as ibuprofen. Some of these medicines can raise blood pressure. · Learn how to check your blood pressure at home. Lifestyle changes  · Stay at a healthy weight. This is especially important if you put on weight around the waist. Losing even 10 pounds can help you lower your blood pressure. · If your doctor recommends it, get more exercise. Walking is a good choice. Bit by bit, increase the amount you walk every day. Try for at least 30 minutes on most days of the week. You also may want to swim, bike, or do other activities. · Avoid or limit alcohol. Talk to your doctor about whether you can drink any alcohol. · Try to limit how much sodium you eat to less than 2,300 milligrams (mg) a day. Your doctor may ask you to try to eat less than 1,500 mg a day. · Eat plenty of fruits (such as bananas and oranges), vegetables, legumes, whole grains, and low-fat dairy products. · Lower the amount of saturated fat in your diet. Saturated fat is found in animal products such as milk, cheese, and meat. Limiting these foods may help you lose weight and also lower your risk for heart disease. · Do not smoke. Smoking increases your risk for heart attack and stroke. If you need help quitting, talk to your doctor about stop-smoking programs and medicines. These can increase your chances of quitting for good. When should you call for help? Call 911 anytime you think you may need emergency care. This may mean having symptoms that suggest that your blood pressure is causing a serious heart or blood vessel problem. Your blood pressure may be over 180/110. ? For example, call 911 if:  ? · You have symptoms of a heart attack. These may include:  ¨ Chest pain or pressure, or a strange feeling in the chest.  ¨ Sweating. ¨ Shortness of breath. ¨ Nausea or vomiting.   ¨ Pain, pressure, or a strange feeling in the back, neck, jaw, or upper belly or in one or both shoulders or arms.  ¨ Lightheadedness or sudden weakness. ¨ A fast or irregular heartbeat. ? · You have symptoms of a stroke. These may include:  ¨ Sudden numbness, tingling, weakness, or loss of movement in your face, arm, or leg, especially on only one side of your body. ¨ Sudden vision changes. ¨ Sudden trouble speaking. ¨ Sudden confusion or trouble understanding simple statements. ¨ Sudden problems with walking or balance. ¨ A sudden, severe headache that is different from past headaches. ? · You have severe back or belly pain. ?Do not wait until your blood pressure comes down on its own. Get help right away. ?Call your doctor now or seek immediate care if:  ? · Your blood pressure is much higher than normal (such as 180/110 or higher), but you don't have symptoms. ? · You think high blood pressure is causing symptoms, such as:  ¨ Severe headache. ¨ Blurry vision. ? Watch closely for changes in your health, and be sure to contact your doctor if:  ? · Your blood pressure measures 140/90 or higher at least 2 times. That means the top number is 140 or higher or the bottom number is 90 or higher, or both. ? · You think you may be having side effects from your blood pressure medicine. ? · Your blood pressure is usually normal, but it goes above normal at least 2 times. Where can you learn more? Go to http://garry-aurora.info/. Enter L671 in the search box to learn more about \"High Blood Pressure: Care Instructions. \"  Current as of: September 21, 2016  Content Version: 11.4  © 0935-1980 [employer]. Care instructions adapted under license by [employer] (which disclaims liability or warranty for this information). If you have questions about a medical condition or this instruction, always ask your healthcare professional. [place] [address] any warranty or liability for your use of this information.

## 2018-04-05 PROBLEM — E66.09 CLASS 1 OBESITY DUE TO EXCESS CALORIES WITHOUT SERIOUS COMORBIDITY WITH BODY MASS INDEX (BMI) OF 30.0 TO 30.9 IN ADULT: Status: ACTIVE | Noted: 2018-04-05

## 2018-04-06 ENCOUNTER — OFFICE VISIT (OUTPATIENT)
Dept: INTERNAL MEDICINE CLINIC | Age: 53
End: 2018-04-06

## 2018-04-06 VITALS
DIASTOLIC BLOOD PRESSURE: 80 MMHG | WEIGHT: 208.4 LBS | TEMPERATURE: 98.6 F | BODY MASS INDEX: 29.84 KG/M2 | RESPIRATION RATE: 16 BRPM | HEART RATE: 74 BPM | OXYGEN SATURATION: 96 % | SYSTOLIC BLOOD PRESSURE: 132 MMHG | HEIGHT: 70 IN

## 2018-04-06 DIAGNOSIS — I10 ESSENTIAL HYPERTENSION: Primary | ICD-10-CM

## 2018-04-06 DIAGNOSIS — Z12.11 COLON CANCER SCREENING: ICD-10-CM

## 2018-04-06 DIAGNOSIS — L23.9 ALLERGIC DERMATITIS: ICD-10-CM

## 2018-04-06 RX ORDER — ASCORBIC ACID 250 MG
TABLET ORAL
COMMUNITY
End: 2018-08-16 | Stop reason: ALTCHOICE

## 2018-04-06 RX ORDER — TERBINAFINE HYDROCHLORIDE 250 MG/1
TABLET ORAL
Refills: 2 | COMMUNITY
Start: 2018-02-23 | End: 2018-05-03 | Stop reason: ALTCHOICE

## 2018-04-06 RX ORDER — BISMUTH SUBSALICYLATE 262 MG
1 TABLET,CHEWABLE ORAL DAILY
COMMUNITY
End: 2018-08-16 | Stop reason: ALTCHOICE

## 2018-04-06 RX ORDER — ETANERCEPT 50 MG/ML
SOLUTION SUBCUTANEOUS
COMMUNITY
Start: 2018-04-02 | End: 2018-12-13 | Stop reason: ALTCHOICE

## 2018-04-06 RX ORDER — CLOBETASOL PROPIONATE 0.5 MG/G
CREAM TOPICAL
Refills: 6 | COMMUNITY
Start: 2018-04-01 | End: 2018-05-03 | Stop reason: ALTCHOICE

## 2018-04-06 RX ORDER — KETOCONAZOLE 20 MG/G
CREAM TOPICAL
Refills: 2 | COMMUNITY
Start: 2018-04-01 | End: 2018-05-03 | Stop reason: ALTCHOICE

## 2018-04-06 RX ORDER — ASPIRIN 81 MG/1
TABLET ORAL DAILY
COMMUNITY

## 2018-04-06 RX ORDER — FLUTICASONE PROPIONATE 50 MCG
SPRAY, SUSPENSION (ML) NASAL
Refills: 3 | COMMUNITY
Start: 2018-03-18 | End: 2020-04-14 | Stop reason: SDUPTHER

## 2018-04-06 RX ORDER — PREDNISONE 10 MG/1
TABLET ORAL
COMMUNITY
Start: 2018-04-05 | End: 2018-05-03 | Stop reason: ALTCHOICE

## 2018-04-06 RX ORDER — GLUCOSAM/CHONDRO/HERB 149/HYAL 750-100 MG
1 TABLET ORAL DAILY
COMMUNITY
End: 2018-08-16 | Stop reason: ALTCHOICE

## 2018-04-06 RX ORDER — AMLODIPINE BESYLATE 5 MG/1
5 TABLET ORAL DAILY
Qty: 30 TAB | Status: SHIPPED | OUTPATIENT
Start: 2018-04-06 | End: 2018-04-23

## 2018-04-06 RX ORDER — LEFLUNOMIDE 20 MG/1
TABLET ORAL
Refills: 1 | COMMUNITY
Start: 2018-02-05 | End: 2020-07-31 | Stop reason: ALTCHOICE

## 2018-04-06 NOTE — MR AVS SNAPSHOT
[address] [facility] 
 
 
 [address] [address] [phone] Patient: Adrián Strauss MRN: [id number] KWS:[id number][birth year][id number] Visit Information Date & Time Provider Department Dept. Phone Encounter #  
 4/6/2018  2:50 PM Chalino PonceShorty Tirado [id number] Your Appointments 5/3/2018 10:50 AM  
FOLLOW UP 10 with MD TORY Ponce [facility] ([facility]) Appt Note: 1 month follow up  labs; 1 month follow up   labs; 1 month follow up     labs [address] [address] [address] [facility] [address] Upcoming Health Maintenance Date Due Hepatitis C Screening 1965 COLONOSCOPY 9/19/1982 DTaP/Tdap/Td series (1 - Tdap) 9/19/1985 Allergies as of 4/6/2018  Review Complete On: 4/6/2018 By: Sherita Alvarez LPN Severity Noted Reaction Type Reactions Sulfa (Sulfonamide Antibiotics)  01/04/2012    Nausea and Vomiting Current Immunizations  Never Reviewed Name Date Influenza Vaccine 10/1/2016 Not reviewed this visit Vitals BP Pulse Temp Resp Height(growth percentile) Weight(growth percentile) 132/80 (BP 1 Location: Left arm, BP Patient Position: Sitting) 74 98.6 °F (37 °C) (Oral) 16 5' 10\" (1.778 m) 208 lb 6.4 oz (94.5 kg) SpO2 BMI Smoking Status 96% 29.9 kg/m2 Never Smoker Vitals History BMI and BSA Data Body Mass Index Body Surface Area  
 29.9 kg/m 2 2.16 m 2 Preferred Pharmacy Pharmacy Name Phone [id number] [facility] [phone] Your Updated Medication List  
  
   
This list is accurate as of 4/6/18  3:30 PM.  Always use your most recent med list.  
  
  
  
  
 aspirin delayed-release 81 mg tablet Take  by mouth daily. CLARITIN 10 mg tablet Generic drug:  loratadine Take 10 mg by mouth daily. clobetasol 0.05 % topical cream  
Commonly known as:  TEMOVATE  
1 APPLICATION TO AFFECTED AREA ONCE A DAY EXTERNALLY 30 DAYS  
  
 ENBREL SURECLICK 50 mg/mL (1.26 mL) injection Generic drug:  etanercept  
  
 fluticasone 50 mcg/actuation nasal spray Commonly known as:  FLONASE  
USE 2 SPRAYS IN EACH NOSTRIL AT BEDTIME  
  
 folic acid 1 mg tablet Commonly known as:  Google Take 1 mg by mouth daily. ketoconazole 2 % topical cream  
Commonly known as:  NIZORAL  
APPLY TO AFFECTED AREA TWICE A DAY EXTERNALLY 30 DAY(S)  
  
 leflunomide 20 mg tablet Commonly known as:  Chandni Gallon TAKE 1 TABLET ONCE A DAY  
  
 levothyroxine 50 mcg tablet Commonly known as:  SYNTHROID Take 50 mcg by mouth Daily (before breakfast). Indications: hypothyroidism  
  
 lisinopril 10 mg tablet Commonly known as:  Tildon Mariann Take 1 Tab by mouth daily. multivitamin tablet Commonly known as:  ONE A DAY Take 1 Tab by mouth daily. omega 3-DHA-EPA-fish oil 1,000 mg (120 mg-180 mg) capsule Take 1 Cap by mouth daily. predniSONE 10 mg tablet Commonly known as:  DELTASONE  
  
 terbinafine HCl 250 mg tablet Commonly known as:  LAMISIL TAKE 1 TABLET BY MOUTH ONCE A DAY  
  
 TURMERIC ROOT EXTRACT PO Take 1,000 mg by mouth. VITAMIN C 250 mg tablet Generic drug:  ascorbic acid (vitamin C) Take  by mouth. VITAMIN D3 2,000 unit Tab Generic drug:  cholecalciferol (vitamin D3) Take 1 Tab by mouth daily. ZANTAC 150 mg tablet Generic drug:  raNITIdine Take 150 mg by mouth Every Friday. Introducing [facility] & HE[facility] SERVICES! Mis Lopez introduces [employer] patient portal. Now you can access parts of your medical record, email your doctor's office, and request medication refills online.    
 
1. In your internet browser, go to [email or web address] 2. Click on the First Time User? Click Here link in the Sign In box. You will see the New Member Sign Up page. 3. Enter your Keyade Access Code exactly as it appears below. You will not need to use this code after youve completed the sign-up process. If you do not sign up before the expiration date, you must request a new code. · Keyade Access Code: [email or web address] Expires: 6/17/2018  3:38 PM 
 
4. Enter the last four digits of your Social Security Number (xxxx) and Date of Birth (mm/dd/yyyy) as indicated and click Submit. You will be taken to the next sign-up page. 5. Create a [employer] Furnituret ID. This will be your Keyade login ID and cannot be changed, so think of one that is secure and easy to remember. 6. Create a Keyade password. You can change your password at any time. 7. Enter your Password Reset Question and Answer. This can be used at a later time if you forget your password. 8. Enter your e-mail address. You will receive e-mail notification when new information is available in [address] 9. Click Sign Up. You can now view and download portions of your medical record. 10. Click the Download Summary menu link to download a portable copy of your medical information. If you have questions, please visit the Frequently Asked Questions section of the Keyade website. Remember, Keyade is NOT to be used for urgent needs. For medical emergencies, dial 911. Now available from your iPhone and Android! Please provide this summary of care documentation to your next provider. Your primary care clinician is listed as Ludmila. If you have any questions after today's visit, please call [phone].

## 2018-04-06 NOTE — PATIENT INSTRUCTIONS
High Blood Pressure: Care Instructions  Your Care Instructions    If your blood pressure is usually above 140/90, you have high blood pressure, or hypertension. That means the top number is 140 or higher or the bottom number is 90 or higher, or both. Despite what a lot of people think, high blood pressure usually doesn't cause headaches or make you feel dizzy or lightheaded. It usually has no symptoms. But it does increase your risk for heart attack, stroke, and kidney or eye damage. The higher your blood pressure, the more your risk increases. Your doctor will give you a goal for your blood pressure. Your goal will be based on your health and your age. An example of a goal is to keep your blood pressure below 140/90. Lifestyle changes, such as eating healthy and being active, are always important to help lower blood pressure. You might also take medicine to reach your blood pressure goal.  Follow-up care is a key part of your treatment and safety. Be sure to make and go to all appointments, and call your doctor if you are having problems. It's also a good idea to know your test results and keep a list of the medicines you take. How can you care for yourself at home? Medical treatment  · If you stop taking your medicine, your blood pressure will go back up. You may take one or more types of medicine to lower your blood pressure. Be safe with medicines. Take your medicine exactly as prescribed. Call your doctor if you think you are having a problem with your medicine. · Talk to your doctor before you start taking aspirin every day. Aspirin can help certain people lower their risk of a heart attack or stroke. But taking aspirin isn't right for everyone, because it can cause serious bleeding. · See your doctor regularly. You may need to see the doctor more often at first or until your blood pressure comes down.   · If you are taking blood pressure medicine, talk to your doctor before you take decongestants or anti-inflammatory medicine, such as ibuprofen. Some of these medicines can raise blood pressure. · Learn how to check your blood pressure at home. Lifestyle changes  · Stay at a healthy weight. This is especially important if you put on weight around the waist. Losing even 10 pounds can help you lower your blood pressure. · If your doctor recommends it, get more exercise. Walking is a good choice. Bit by bit, increase the amount you walk every day. Try for at least 30 minutes on most days of the week. You also may want to swim, bike, or do other activities. · Avoid or limit alcohol. Talk to your doctor about whether you can drink any alcohol. · Try to limit how much sodium you eat to less than 2,300 milligrams (mg) a day. Your doctor may ask you to try to eat less than 1,500 mg a day. · Eat plenty of fruits (such as bananas and oranges), vegetables, legumes, whole grains, and low-fat dairy products. · Lower the amount of saturated fat in your diet. Saturated fat is found in animal products such as milk, cheese, and meat. Limiting these foods may help you lose weight and also lower your risk for heart disease. · Do not smoke. Smoking increases your risk for heart attack and stroke. If you need help quitting, talk to your doctor about stop-smoking programs and medicines. These can increase your chances of quitting for good. When should you call for help? Call 911 anytime you think you may need emergency care. This may mean having symptoms that suggest that your blood pressure is causing a serious heart or blood vessel problem. Your blood pressure may be over 180/110. ? For example, call 911 if:  ? · You have symptoms of a heart attack. These may include:  ¨ Chest pain or pressure, or a strange feeling in the chest.  ¨ Sweating. ¨ Shortness of breath. ¨ Nausea or vomiting.   ¨ Pain, pressure, or a strange feeling in the back, neck, jaw, or upper belly or in one or both shoulders or arms.  ¨ Lightheadedness or sudden weakness. ¨ A fast or irregular heartbeat. ? · You have symptoms of a stroke. These may include:  ¨ Sudden numbness, tingling, weakness, or loss of movement in your face, arm, or leg, especially on only one side of your body. ¨ Sudden vision changes. ¨ Sudden trouble speaking. ¨ Sudden confusion or trouble understanding simple statements. ¨ Sudden problems with walking or balance. ¨ A sudden, severe headache that is different from past headaches. ? · You have severe back or belly pain. ?Do not wait until your blood pressure comes down on its own. Get help right away. ?Call your doctor now or seek immediate care if:  ? · Your blood pressure is much higher than normal (such as 180/110 or higher), but you don't have symptoms. ? · You think high blood pressure is causing symptoms, such as:  ¨ Severe headache. ¨ Blurry vision. ? Watch closely for changes in your health, and be sure to contact your doctor if:  ? · Your blood pressure measures 140/90 or higher at least 2 times. That means the top number is 140 or higher or the bottom number is 90 or higher, or both. ? · You think you may be having side effects from your blood pressure medicine. ? · Your blood pressure is usually normal, but it goes above normal at least 2 times. Where can you learn more? Go to http://garry-aurora.info/. Enter W495 in the search box to learn more about \"High Blood Pressure: Care Instructions. \"  Current as of: September 21, 2016  Content Version: 11.4  © 5629-7822 [employer]. Care instructions adapted under license by [employer] (which disclaims liability or warranty for this information). If you have questions about a medical condition or this instruction, always ask your healthcare professional. [place] [address] any warranty or liability for your use of this information.

## 2018-04-06 NOTE — PROGRESS NOTES
Subjective:   Jennifer Rich is a 47 y.o. male      Chief Complaint   Patient presents with    Rash     Went to dermatologist yesterday for rash and was told it might be his BP med. History of present illness: He presents today for evaluation of a rash that is developed over his chest back arms and legs is very pruritic in nature. He saw his dermatologist noted that the rash started about a week after he started on lisinopril and the dermatologist thought it was related to lisinopril and told him to stop it and gave her prescription for prednisone to take 40 mg a day for 5 days then 30 a day for 5 days then 20 a day for 5 days and then 10 a day for 5 days. He wanted to see me if we stop the lisinopril. He notes he had a rash similar to this about a year ago and said that was diagnosed as a fungal rash at that point and was placed on antifungal medicine that resolved. He notes no other exposure to any other toxins and his only new medicine is the lisinopril. He does note as stated stated above that the rash started about a week after beginning lisinopril.     Patient Active Problem List   Diagnosis Code    Acquired hypothyroidism E03.9    Essential hypertension I10    Rheumatoid arthritis involving multiple sites with positive rheumatoid factor (HCC) M05.79    Class 1 obesity due to excess calories without serious comorbidity with body mass index (BMI) of 30.0 to 30.9 in adult E66.09, Z68.30    Allergic dermatitis L23.9      Past Medical History:   Diagnosis Date    DDD (degenerative disc disease), lumbar     Elevated BP     1/20/17 on no meds at this time    Hypothyroid     MARYAN on CPAP     RA (rheumatoid arthritis) ([facility])       Allergies   Allergen Reactions    Sulfa (Sulfonamide Antibiotics) Nausea and Vomiting      Family History   Problem Relation Age of Onset    Hypertension Mother       Social History     Social History    Marital status:      Spouse name: N/A    Number of children: N/A    Years of education: N/A     Occupational History    Not on file. Social History Main Topics    Smoking status: Never Smoker    Smokeless tobacco: Never Used    Alcohol use No    Drug use: No    Sexual activity: Not on file     Other Topics Concern    Not on file     Social History Narrative     Prior to Admission medications    Medication Sig Start Date End Date Taking? Authorizing Provider   ENBREL SURECLICK 50 mg/mL (4.73 mL) injection  4/2/18  Yes Historical Provider   predniSONE (DELTASONE) 10 mg tablet  4/5/18  Yes Historical Provider   clobetasol (TEMOVATE) 0.05 % topical cream 1 APPLICATION TO AFFECTED AREA ONCE A DAY EXTERNALLY 30 DAYS 4/1/18  Yes Historical Provider   fluticasone (FLONASE) 50 mcg/actuation nasal spray USE 2 SPRAYS IN EACH NOSTRIL AT BEDTIME 3/18/18  Yes Historical Provider   ketoconazole (NIZORAL) 2 % topical cream APPLY TO AFFECTED AREA TWICE A DAY EXTERNALLY 30 DAY(S) 4/1/18  Yes Historical Provider   terbinafine HCl (LAMISIL) 250 mg tablet TAKE 1 TABLET BY MOUTH ONCE A DAY 2/23/18  Yes Historical Provider   aspirin delayed-release 81 mg tablet Take  by mouth daily. Yes Historical Provider   TURMERIC ROOT EXTRACT PO Take 1,000 mg by mouth. Yes Historical Provider   ascorbic acid, vitamin C, (VITAMIN C) 250 mg tablet Take  by mouth. Yes Historical Provider   omega 3-DHA-EPA-fish oil 1,000 mg (120 mg-180 mg) capsule Take 1 Cap by mouth daily. Yes Historical Provider   multivitamin (ONE A DAY) tablet Take 1 Tab by mouth daily. Yes Historical Provider   amLODIPine (NORVASC) 5 mg tablet Take 1 Tab by mouth daily. 4/6/18  Yes Romaine Silvestre MD   loratadine (CLARITIN) 10 mg tablet Take 10 mg by mouth daily. Yes Historical Provider   levothyroxine (SYNTHROID) 50 mcg tablet Take 50 mcg by mouth Daily (before breakfast).  Indications: hypothyroidism   Yes Historical Provider   cholecalciferol, vitamin D3, (VITAMIN D3) 2,000 unit tab Take 1 Tab by mouth daily. Yes Historical Provider   leflunomide (ARAVA) 20 mg tablet TAKE 1 TABLET ONCE A DAY 2/5/18   Historical Provider   folic acid (FOLVITE) 1 mg tablet Take 1 mg by mouth daily. Historical Provider   raNITIdine (ZANTAC) 150 mg tablet Take 150 mg by mouth Every Friday. Historical Provider        Review of Systems              Constitutional:  He denies fever, weight loss, sweats or fatigue. EYES: No blurred or double vision,               ENT: no nasal congestion, no headache or dizziness. No difficulty with               swallowing, taste, speech or smell. Respiratory:  No cough, wheezing or shortness of breath. No sputum production. Cardiac:  Denies chest pain, palpitations, unexplained indigestion, syncope, edema, PND or orthopnea. GI:  No changes in bowel movements, no abdominal pain, no bloating, anorexia, nausea, vomiting or heartburn. :  No frequency or dysuria. Denies incontinence or sexual dysfunction. Extremities:  No joint pain, stiffness or swelling  Back:.no pain or soreness  Skin: Extensive pruritic dermatitis arms legs chest and back no mole changes. Neurological:  No numbness, tingling, burning paresthesias or loss of motor strength. No syncope, dizziness, frequent headaches or memory loss. Hematologic:  No easy bruising  Lymphatic: No lymph node enlargement    Objective:     Vitals:    04/06/18 1455   BP: 132/80   Pulse: 74   Resp: 16   Temp: 98.6 °F (37 °C)   TempSrc: Oral   SpO2: 96%   Weight: 208 lb 6.4 oz (94.5 kg)   Height: 5' 10\" (1.778 m)   PainSc:   0 - No pain       Body mass index is 29.9 kg/(m^2). Physical Examination:              General Appearance:  Well-developed, well-nourished, no acute distress. HEENT:      Ears:  The TMs and ear canals were clear. Eyes:  The pupillary responses were normal.  Extraocular muscle function intact. Lids and conjunctiva not injected. Funduscopic exam revealed sharp disc margins.   Nares: Clear w/o edema or erythema  Pharynx:  Clear with teeth in good repair. No masses were noted. Neck:  Supple without thyromegaly or adenopathy. No JVD noted. No carotid                bruits. Lungs:  Clear to auscultation and percussion. Cardiac:  Regular rate and rhythm without murmur. PMI not displaced. No gallop, rub or click. Abdominal: Soft, non-tender, no hepata-spleenomegally or masses  Extremities:  No clubbing, cyanosis or edema. Skin: Scattered erythroderma arms legs and back consistent with allergic dermatitis no unusual mole changes noted. Lymph Nodes:  None felt in the cervical, supraclavicular, axillary or inguinal region. Neurological: . DTRs 2+ and symmetric. Station and gait normal.   Hematologic:   No purpura or petechiae        Assessment/Plan:         1. Essential hypertension    2. Allergic dermatitis    3. Colon cancer screening        Impressions/Plan:  Allergic dermatitis at think would be unlikely that his lisinopril although it did start a week after starting lisinopril so I think at this point is reasonable to stop and replace with Norvasc 5 mg daily for blood pressure control. I did discuss with him the prednisone how to take it I would do split dosing at 20 twice daily for 5 days and then 20 a.m. 10 PM for 5 days and then 10 twice daily for 5 days and then 10 daily for 5 days. I will recheck a myself in about 4 weeks and see what his blood pressure at that time he is due for his annual checkup which is overdue for. Note he has never had a screening colonoscopy I will go ahead and set up an appointment for that with Dr. Marie Kearney.     Orders Placed This Encounter    REFERRAL FOR COLONOSCOPY    ENBREL SURECLICK 50 mg/mL (4.49 mL) injection    predniSONE (DELTASONE) 10 mg tablet    clobetasol (TEMOVATE) 0.05 % topical cream    fluticasone (FLONASE) 50 mcg/actuation nasal spray    ketoconazole (NIZORAL) 2 % topical cream    leflunomide (ARAVA) 20 mg tablet    terbinafine HCl (LAMISIL) 250 mg tablet    aspirin delayed-release 81 mg tablet    TURMERIC ROOT EXTRACT PO    ascorbic acid, vitamin C, (VITAMIN C) 250 mg tablet    omega 3-DHA-EPA-fish oil 1,000 mg (120 mg-180 mg) capsule    multivitamin (ONE A DAY) tablet    amLODIPine (NORVASC) 5 mg tablet       Follow-up Disposition:  Return in about 4 weeks (around 5/4/2018). No results found for any visits on 04/06/18. David Winston MD    The patient was given after the visit summary the patient verbalized an understanding of the plans and problems as explained.

## 2018-04-06 NOTE — PROGRESS NOTES
1. Have you been to the ER, urgent care clinic since your last visit? Hospitalized since your last visit? No    2. Have you seen or consulted any other health care providers outside of the [facility] since your last visit? Include any pap smears or colon screening. Yes, Dr. Jayshree Cervantes,  Dermatologist, 04-, for rash. Chief Complaint   Patient presents with    Rash     Went to dermatologist yesterday for rash and was told it might be his BP med.

## 2018-04-23 ENCOUNTER — TELEPHONE (OUTPATIENT)
Dept: INTERNAL MEDICINE CLINIC | Age: 53
End: 2018-04-23

## 2018-04-23 DIAGNOSIS — I10 ESSENTIAL HYPERTENSION: Primary | ICD-10-CM

## 2018-04-23 RX ORDER — LISINOPRIL 10 MG/1
10 TABLET ORAL DAILY
Qty: 90 TAB | Refills: 3 | Status: SHIPPED | OUTPATIENT
Start: 2018-04-23 | End: 2018-05-15 | Stop reason: SDUPTHER

## 2018-04-23 NOTE — TELEPHONE ENCOUNTER
RX refill request from the patient/pharmacy. Patient last seen 11- with labs, and next appt. scheduled for 05-.

## 2018-04-23 NOTE — TELEPHONE ENCOUNTER
Wife called to report that her  apparently not having a rash from the Lisinopril as thought by the dermatologist because the rash is still continuing even though he is now on the new blood pressure medicine. Wife states he felt better on the Lisinopril. Discussed with Dr. Lady Vides and leah given to d/c Norvasc and restart the Lisinopril 10 mg daily. Rx sent to pharmacy as requested.

## 2018-05-03 ENCOUNTER — OFFICE VISIT (OUTPATIENT)
Dept: INTERNAL MEDICINE CLINIC | Age: 53
End: 2018-05-03

## 2018-05-03 VITALS
DIASTOLIC BLOOD PRESSURE: 86 MMHG | RESPIRATION RATE: 17 BRPM | WEIGHT: 206.8 LBS | BODY MASS INDEX: 29.6 KG/M2 | TEMPERATURE: 98.2 F | HEART RATE: 75 BPM | HEIGHT: 70 IN | OXYGEN SATURATION: 97 % | SYSTOLIC BLOOD PRESSURE: 138 MMHG

## 2018-05-03 DIAGNOSIS — E66.09 CLASS 1 OBESITY DUE TO EXCESS CALORIES WITHOUT SERIOUS COMORBIDITY WITH BODY MASS INDEX (BMI) OF 30.0 TO 30.9 IN ADULT: ICD-10-CM

## 2018-05-03 DIAGNOSIS — I10 ESSENTIAL HYPERTENSION: ICD-10-CM

## 2018-05-03 DIAGNOSIS — Z00.00 ANNUAL PHYSICAL EXAM: Primary | ICD-10-CM

## 2018-05-03 DIAGNOSIS — E03.9 ACQUIRED HYPOTHYROIDISM: ICD-10-CM

## 2018-05-03 DIAGNOSIS — L23.9 ALLERGIC DERMATITIS: ICD-10-CM

## 2018-05-03 LAB
ALBUMIN SERPL-MCNC: 4.7 G/DL (ref 3.9–5.4)
ALKALINE PHOS POC: 71 U/L (ref 38–126)
ALT SERPL-CCNC: 58 U/L (ref 9–52)
AST SERPL-CCNC: 33 U/L (ref 14–36)
BACTERIA UA POCT, BACTPOCT: NORMAL
BILIRUB UR QL STRIP: NEGATIVE
BUN BLD-MCNC: 15 MG/DL (ref 9–20)
CALCIUM BLD-MCNC: 9.6 MG/DL (ref 8.4–10.2)
CASTS UA POCT: 0
CHLORIDE BLD-SCNC: 103 MMOL/L (ref 98–107)
CHOLEST SERPL-MCNC: 186 MG/DL (ref 0–200)
CLUE CELLS, CLUEPOCT: NEGATIVE
CO2 POC: 30 MMOL/L (ref 22–32)
CREAT BLD-MCNC: 0.9 MG/DL (ref 0.8–1.5)
CRYSTALS UA POCT, CRYSPOCT: NEGATIVE
EGFR (POC): 97.2
EPITHELIAL CELLS POCT: NORMAL
GLUCOSE POC: 89 MG/DL (ref 75–110)
GLUCOSE UR-MCNC: NEGATIVE MG/DL
GRAN# POC: 3.2 K/UL (ref 2–7.8)
GRAN% POC: 69.4 % (ref 37–92)
HCT VFR BLD CALC: 42.6 % (ref 37–51)
HDLC SERPL-MCNC: 50 MG/DL (ref 35–130)
HGB BLD-MCNC: 14.3 G/DL (ref 12–18)
KETONES P FAST UR STRIP-MCNC: NEGATIVE MG/DL
LDL CHOLESTEROL POC: 115.2 MG/DL (ref 0–130)
LY# POC: 1.1 K/UL (ref 0.6–4.1)
LY% POC: 23.9 % (ref 10–58.5)
MCH RBC QN: 30.6 PG (ref 26–32)
MCHC RBC-ENTMCNC: 33.6 G/DL (ref 30–36)
MCV RBC: 91 FL (ref 80–97)
MID #, POC: 0.3 K/UL (ref 0–1.8)
MID% POC: 6.7 % (ref 0.1–24)
MUCUS UA POCT, MUCPOCT: NORMAL
PH UR STRIP: 5 [PH] (ref 5–7)
PLATELET # BLD: 218 K/UL (ref 140–440)
POTASSIUM SERPL-SCNC: 4.3 MMOL/L (ref 3.6–5)
PROT SERPL-MCNC: 7.7 G/DL (ref 6.3–8.2)
PROT UR QL STRIP: NEGATIVE
PSA SERPL-MCNC: 0.6 NG/ML (ref 0–4)
RBC # BLD: 4.68 M/UL (ref 4.2–6.3)
RBC UA POCT, RBCPOCT: 0
SODIUM SERPL-SCNC: 143 MMOL/L (ref 137–145)
SP GR UR STRIP: 1.01 (ref 1.01–1.02)
T4 FREE SERPL-MCNC: 0.9 NG/DL (ref 0.71–1.85)
TCHOL/HDL RATIO (POC): 3.7 (ref 0–4)
TOTAL BILIRUBIN POC: 0.7 MG/DL (ref 0.2–1.3)
TRICH UA POCT, TRICHPOC: NEGATIVE
TRIGL SERPL-MCNC: 104 MG/DL (ref 0–200)
TSH BLD-ACNC: 1.08 UIU/ML (ref 0.4–4.2)
UA UROBILINOGEN AMB POC: NORMAL (ref 0.2–1)
URINALYSIS CLARITY POC: CLEAR
URINALYSIS COLOR POC: NORMAL
URINE BLOOD POC: NEGATIVE
URINE CULT COMMENT, POCT: NORMAL
URINE LEUKOCYTES POC: NEGATIVE
URINE NITRITES POC: NEGATIVE
VLDLC SERPL CALC-MCNC: 20.8 MG/DL
WBC # BLD: 4.6 K/UL (ref 4.1–10.9)
WBC UA POCT, WBCPOCT: NORMAL
YEAST UA POCT, YEASTPOC: NEGATIVE

## 2018-05-03 RX ORDER — CYCLOBENZAPRINE HCL 10 MG
10 TABLET ORAL
Qty: 30 TAB | Refills: 1 | Status: SHIPPED | OUTPATIENT
Start: 2018-05-03 | End: 2020-10-16 | Stop reason: SDUPTHER

## 2018-05-03 RX ORDER — PREDNISONE 10 MG/1
10 TABLET ORAL 2 TIMES DAILY
Qty: 28 TAB | Refills: 0 | Status: SHIPPED | OUTPATIENT
Start: 2018-05-03 | End: 2018-12-13 | Stop reason: ALTCHOICE

## 2018-05-03 NOTE — PROGRESS NOTES
Chief Complaint   Patient presents with    Labs     one month follow up       1. Have you been to the ER, urgent care clinic since your last visit? Hospitalized since your last visit? No    2. Have you seen or consulted any other health care providers outside of the [address] [place] [address] since your last visit? Include any pap smears or colon screening. Yes, [facility] for ear infection.      Fasting

## 2018-05-03 NOTE — PATIENT INSTRUCTIONS

## 2018-05-03 NOTE — MR AVS SNAPSHOT
[address] [facility] [address] [address] [phone] Patient: Ronaldo Rivera MRN: [id number] SPE:[id number] Visit Information Date & Time Provider Department Dept. Phone Encounter #  
 5/3/2018  9:00 AM Domingo Allen, [facility] [phone] [id number] Follow-up Instructions Return in about 3 months (around 8/3/2018). Your Appointments 8/16/2018  8:00 AM  
FOLLOW UP 10 with MD JEOVANY Ricks [facility] ([address]) Appt Note: [address] [address] [address] [facility] [address] Upcoming Health Maintenance Date Due COLONOSCOPY 9/19/1982 DTaP/Tdap/Td series (1 - Tdap) 9/19/1985 Influenza Age 5 to Adult 8/1/2018 Allergies as of 5/3/2018  Review Complete On: 5/3/2018 By: Domingo Allen MD  
  
 Severity Noted Reaction Type Reactions Sulfa (Sulfonamide Antibiotics)  01/04/2012    Nausea and Vomiting Current Immunizations  Never Reviewed Name Date Influenza Vaccine 10/1/2016 Not reviewed this visit You Were Diagnosed With   
  
 Codes Comments Annual physical exam    -  Primary ICD-10-CM: Z00.00 ICD-9-CM: V70.0 Essential hypertension     ICD-10-CM: I10 
ICD-9-CM: 401.9 Class 1 obesity due to excess calories without serious comorbidity with body mass index (BMI) of 30.0 to 30.9 in adult     ICD-10-CM: E66.09, Z68.30 ICD-9-CM: 278.00, V85.30 Acquired hypothyroidism     ICD-10-CM: E03.9 ICD-9-CM: 500. 9 Vitals BP Pulse Temp Resp Height(growth percentile) Weight(growth percentile) 138/86 75 98.2 °F (36.8 °C) (Oral) 17 5' 10\" (1.778 m) 206 lb 12.8 oz (93.8 kg) SpO2 BMI Smoking Status 97% 29.67 kg/m2 Never Smoker Vitals History BMI and BSA Data Body Mass Index Body Surface Area  
 29.67 kg/m 2 2.15 m 2 Preferred Pharmacy Pharmacy Name Phone [facility]/PHARMACY #[phone] [facility] [phone] Your Updated Medication List  
  
   
This list is accurate as of 5/3/18 10:14 AM.  Always use your most recent med list.  
  
  
  
  
 aspirin delayed-release 81 mg tablet Take  by mouth daily. CLARITIN 10 mg tablet Generic drug:  loratadine Take 10 mg by mouth daily. cyclobenzaprine 10 mg tablet Commonly known as:  FLEXERIL Take 1 Tab by mouth three (3) times daily as needed for Muscle Spasm(s). ENBREL SURECLICK 50 mg/mL (3.31 mL) injection Generic drug:  etanercept  
  
 fluticasone 50 mcg/actuation nasal spray Commonly known as:  FLONASE  
USE 2 SPRAYS IN EACH NOSTRIL AT BEDTIME  
  
 leflunomide 20 mg tablet Commonly known as:  Kaleen Hussar TAKE 1 TABLET ONCE A DAY  
  
 levothyroxine 50 mcg tablet Commonly known as:  SYNTHROID Take 50 mcg by mouth Daily (before breakfast). Indications: hypothyroidism  
  
 lisinopril 10 mg tablet Commonly known as:  Jeanette Fess Take 1 Tab by mouth daily. multivitamin tablet Commonly known as:  ONE A DAY Take 1 Tab by mouth daily. omega 3-DHA-EPA-fish oil 1,000 mg (120 mg-180 mg) capsule Take 1 Cap by mouth daily. predniSONE 10 mg tablet Commonly known as:  Aakash Jabs Take 1 Tab by mouth two (2) times a day. TURMERIC ROOT EXTRACT PO Take 1,000 mg by mouth. VITAMIN C 250 mg tablet Generic drug:  ascorbic acid (vitamin C) Take  by mouth. VITAMIN D3 2,000 unit Tab Generic drug:  cholecalciferol (vitamin D3) Take 1 Tab by mouth daily. Prescriptions Sent to Pharmacy Refills  
 predniSONE (DELTASONE) 10 mg tablet 0 Sig: Take 1 Tab by mouth two (2) times a day.   
 Class: Normal  
 Pharmacy: [facility]/pharmacy #[id number] - [address] , [address] [place] [address] Ph #: [phone] Route: Oral  
 cyclobenzaprine (FLEXERIL) 10 mg tablet 1 Sig: Take 1 Tab by mouth three (3) times daily as needed for Muscle Spasm(s). Class: Normal  
 Pharmacy: [place] [id number], [address] [place] [address] Ph #: [phone] Route: Oral  
  
We Performed the Following AMB POC COMPLETE CBC,AUTOMATED ENTER [id number] CPT(R)] AMB POC COMPREHENSIVE METABOLIC PANEL [27033 CPT(R)] AMB POC LIPID PROFILE [36851 CPT(R)] AMB POC PSA [19016 CPT(R)] AMB POC T4, FREE [id number] CPT(R)] AMB POC TSH [49700 CPT(R)] AMB POC URINALYSIS DIP STICK AUTO W/ MICRO  [91157 CPT(R)] HEPATITIS C AB [18399 CPT(R)] Follow-up Instructions Return in about 3 months (around 8/3/2018). Patient Instructions Hypothyroidism: Care Instructions Your Care Instructions You have hypothyroidism, which means that your body is not making enough thyroid hormone. This hormone helps your body use energy. If your thyroid level is low, you may feel tired, be constipated, have an increase in your blood pressure, or have dry skin or memory problems. You may also get cold easily, even when it is warm. Women with low thyroid levels may have heavy menstrual periods. A blood test to find your thyroid-stimulating hormone (TSH) level is used to check for hypothyroidism. A high TSH level may mean that you have low thyroid. When your body is not making enough thyroid hormone, TSH levels rise in an effort to make the body produce more. The treatment for hypothyroidism is to take thyroid hormone pills. You should start to feel better in 1 to 2 weeks. But it can take several months to see changes in the TSH level. You will need regular visits with your doctor to make sure you have the right dose of medicine. Most people need treatment for the rest of their lives. You will need to see your doctor regularly to have blood tests and to make sure you are doing well. Follow-up care is a key part of your treatment and safety. Be sure to make and go to all appointments, and call your doctor if you are having problems. It's also a good idea to know your test results and keep a list of the medicines you take. How can you care for yourself at home? · Take your thyroid hormone medicine exactly as prescribed. Call your doctor if you think you are having a problem with your medicine. Most people do not have side effects if they take the right amount of medicine regularly. ¨ Take the medicine 30 minutes before breakfast, and do not take it with calcium, vitamins, or iron. ¨ Do not take extra doses of your thyroid medicine. It will not help you get better any faster, and it may cause side effects. ¨ If you forget to take a dose, do NOT take a double dose of medicine. Take your usual dose the next day. · Tell your doctor about all prescription, herbal, or over-the-counter products you take. · Take care of yourself. Eat a healthy diet, get enough sleep, and get regular exercise. When should you call for help? Call 911 anytime you think you may need emergency care. For example, call if: 
? · You passed out (lost consciousness). ? · You have severe trouble breathing. ? · You have a very slow heartbeat (less than 60 beats a minute). ? · You have a low body temperature (95°F or below). ?Call your doctor now or seek immediate medical care if: 
? · You feel tired, sluggish, or weak. ? · You have trouble remembering things or concentrating. ? · You do not begin to feel better 2 weeks after starting your medicine. ? Watch closely for changes in your health, and be sure to contact your doctor if you have any problems. Where can you learn more? Go to http://garry-aurora.info/. Enter P169 in the search box to learn more about \"Hypothyroidism: Care Instructions. \" Current as of: May 12, 2017 Content Version: 11.4 © 7525-1344 Healthwise, [employer]. Care instructions adapted under license by [employer] (which disclaims liability or warranty for this information). If you have questions about a medical condition or this instruction, always ask your healthcare professional. [address] any warranty or liability for your use of this information. Introducing [facility]! Robert Kennedy introduces [employer]raspace patient portal. Now you can access parts of your medical record, email your doctor's office, and request medication refills online. 1. In your internet browser, go to [email or web address] 2. Click on the First Time User? Click Here link in the Sign In box. You will see the New Member Sign Up page. 3. Enter your Nutraspace Access Code exactly as it appears below. You will not need to use this code after youve completed the sign-up process. If you do not sign up before the expiration date, you must request a new code. · [employer] Access Code: [email or web address] Expires: 6/17/2018  3:38 PM 
 
4. Enter the last four digits of your Social Security Number (xxxx) and Date of Birth (mm/dd/yyyy) as indicated and click Submit. You will be taken to the next sign-up page. 5. Create a Nutraspace ID. This will be your Nutraspace login ID and cannot be changed, so think of one that is secure and easy to remember. 6. Create a Nutraspace password. You can change your password at any time. 7. Enter your Password Reset Question and Answer. This can be used at a later time if you forget your password. 8. Enter your e-mail address. You will receive e-mail notification when new information is available in [address] 9. Click Sign Up. You can now view and download portions of your medical record. 10. Click the Download Summary menu link to download a portable copy of your medical information. If you have questions, please visit the Frequently Asked Questions section of the Start[employer] website. Remember, Start[employer] is NOT to be used for urgent needs. For medical emergencies, dial 911. Now available from your iPhone and Android! Please provide this summary of care documentation to your next provider. Your primary care clinician is listed as Ludmila. If you have any questions after today's visit, please call [phone].

## 2018-05-03 NOTE — PROGRESS NOTES
HPI:   47 y.o.  presents for comprehensive annual healthcare examination and follow up appointment. No hospital, ER or specialist visits since last primary care visit except as noted below. He is followed regular for hypertension, obesity, hypothyroidism, and has rheumatoid arthritis followed by his rheumatologist.  He was recently seen and evaluated for a nonspecific dermatitis we will see her dermatologist felt related to lisinopril which obviously was not since he had it after the lisinopril was stopped he did note he had about a year ago around the same time he urinates 1 to could be related to the pollen allergies. He does take Claritin on a regular basis as you will does use Flonase for his allergic rhinitis. He denies any chest pain, shortness of breath, palpitations or cardiorespiratory complaints except allergy related symptoms. There are no GI or  complaints. He has no headaches, dizziness or neurologic complaints. There are no current arthritic complaints. His only complaint is recurrence of the pruritic rash that he had as noted a year ago it did go away with the prednisone and now has returned.     Patient Active Problem List    Diagnosis    Annual physical exam    Allergic dermatitis    Class 1 obesity due to excess calories without serious comorbidity with body mass index (BMI) of 30.0 to 30.9 in adult    Essential hypertension    Rheumatoid arthritis involving multiple sites with positive rheumatoid factor ([address]    Acquired hypothyroidism       Past Medical History:   Diagnosis Date    DDD (degenerative disc disease), lumbar     Elevated BP     1/20/17 on no meds at this time    Hypothyroid     MARYAN on CPAP     RA (rheumatoid arthritis) ([address]        Social History   Substance Use Topics    Smoking status: Never Smoker    Smokeless tobacco: Never Used    Alcohol use No       Family History   Problem Relation Age of Onset    Hypertension Mother        Outpatient Prescriptions Marked as Taking for the 5/3/18 encounter (Office Visit) with Sushma Silvestre MD   Medication Sig Dispense Refill    predniSONE (DELTASONE) 10 mg tablet Take 1 Tab by mouth two (2) times a day. 28 Tab 0    cyclobenzaprine (FLEXERIL) 10 mg tablet Take 1 Tab by mouth three (3) times daily as needed for Muscle Spasm(s). 30 Tab 1    lisinopril (PRINIVIL, ZESTRIL) 10 mg tablet Take 1 Tab by mouth daily. 90 Tab 3    ENBREL SURECLICK 50 mg/mL (7.80 mL) injection       fluticasone (FLONASE) 50 mcg/actuation nasal spray USE 2 SPRAYS IN EACH NOSTRIL AT BEDTIME  3    leflunomide (ARAVA) 20 mg tablet TAKE 1 TABLET ONCE A DAY  1    aspirin delayed-release 81 mg tablet Take  by mouth daily.  TURMERIC ROOT EXTRACT PO Take 1,000 mg by mouth.  ascorbic acid, vitamin C, (VITAMIN C) 250 mg tablet Take  by mouth.  omega 3-DHA-EPA-fish oil 1,000 mg (120 mg-180 mg) capsule Take 1 Cap by mouth daily.  multivitamin (ONE A DAY) tablet Take 1 Tab by mouth daily.  loratadine (CLARITIN) 10 mg tablet Take 10 mg by mouth daily.  levothyroxine (SYNTHROID) 50 mcg tablet Take 50 mcg by mouth Daily (before breakfast). Indications: hypothyroidism      cholecalciferol, vitamin D3, (VITAMIN D3) 2,000 unit tab Take 1 Tab by mouth daily. Allergies   Allergen Reactions    Sulfa (Sulfonamide Antibiotics) Nausea and Vomiting       ROS:     Constitutional: He denies fevers, weight loss, sweats. Eyes: No blurred or double vision. ENT: No difficulty with swallowing, taste, speech or smell. Neck: no stiffness or swelling  Respiratory: No cough wheezing or shortness of breath. Cardiovascular: Denies chest pain, palpitations, unexplained indigestion or syncope. Gastrointestinal:  No changes in bowel movements, no abdominal pain, no bloating. Genitourinary:  He denies frequency, nocturia or stranguria. Extremities: No joint pain, stiffness or swelling.   Neurological:  No numbness, tingling, burring paresthesias or loss of motor strength. No syncope, dizziness or frequent headache  Lymphatic: no adenopathy noted  Hematologic: no easy bruising or bleeding gums  Skin:  No recent rashes or mole changes. .  Pruritic dermatitis is noted  Psychiatric/Behavioral:  Negative for depression. The patient is not nervous/anxious. PE:     Vitals:    05/03/18 0921 05/03/18 1009   BP: 140/82 138/86   Pulse: 75    Resp: 17    Temp: 98.2 °F (36.8 °C)    TempSrc: Oral    SpO2: 97%    Weight: 206 lb 12.8 oz (93.8 kg)    Height: 5' 10\" (1.778 m)    PainSc:   0 - No pain         General appearance - alert, well appearing, and in no distress  Mental status - alert, oriented to person, place, and time  HEENT:  Ears - bilateral TM's and external ear canals clear  Eyes - pupillary responses were normal.  Extraocular muscle function intact. Lids and conjunctiva not injected. Fundoscopic exam revealed sharp disc margins. eye movements intact  Pharynx- clear with teeth in good repair. No masses were noted  Neck - supple without thyromegaly or burit. No JVD noted  Lungs - clear to auscultation and percussion  Cardiac- normal rate, regular rhythm without murmurs. PMI not displaced. No gallop, rub or click  Abdomen - flat, soft, non-tender without palpable organomegaly or mass. No pulsatile mass was felt, and not bruit was heard. Bowel sounds were active  : Circumcised, Testes descended w/o masses  Rectal: Deferred per his request as he is currently on amoxicillin for a urine infection given at a urgent care center  Extremities -  no clubbing cyanosis or edema  Lymphatics - no palpable lymphadenopathy, no hepatosplenomegaly  Hematologic: no petechiae or purpura  Peripheral vascular -Femoral, Dorsalis pedis and posterior tibial pulses felt without difficulty  Skin - no rash or unusual mole change noted  Neurological - Cranial nerves II-XII grossly intact. Motor strength 5/5. DTR's 2+ and symmetric.   Station and gait normal  Back exam - full range of motion, no tenderness, palpable spasm or pain on motion  Musculoskeletal - no joint tenderness, deformity or swelling      Assessment/Plan:     1. Annual physical exam    2. Essential hypertension    3. Class 1 obesity due to excess calories without serious comorbidity with body mass index (BMI) of 30.0 to 30.9 in adult    4. Acquired hypothyroidism    5. Allergic dermatitis        Impression  1. Annual physical examination normal except borderline weight which we discussed  2. Hypertension that is borderline controlled but adequate on recheck by me although I stressed it was getting his weight down to help with that he is constantly losing weight according to what he tells me  3. Obesity weight overall has improved and I have encouraged him to continue to work on that  4. Hypothyroidism repeat status pending  5. Allergic dermatitis and will place him on prednisone 10 mg twice daily to take for 2 weeks. I did ask him at the end of the dosing to take the last will be also has one a day for 2 days and then 1 every other day for 2 doses before discontinuing. We will call the lab make further recommendations and adjustments as necessary. Follow-up schedule III months. Health Maintenance reviewed - updated.     Orders Placed This Encounter    HEPATITIS C AB    AMB POC COMPLETE CBC,AUTOMATED ENTER    AMB POC COMPREHENSIVE METABOLIC PANEL    AMB POC LIPID PROFILE    AMB POC T4, FREE    AMB POC TSH    AMB POC URINALYSIS DIP STICK AUTO W/ MICRO     AMB POC PSA    predniSONE (DELTASONE) 10 mg tablet    cyclobenzaprine (FLEXERIL) 10 mg tablet       Medications Discontinued During This Encounter   Medication Reason    clobetasol (TEMOVATE) 0.05 % topical cream Alternate Therapy    folic acid (FOLVITE) 1 mg tablet Alternate Therapy    ketoconazole (NIZORAL) 2 % topical cream Alternate Therapy    predniSONE (DELTASONE) 10 mg tablet Alternate Therapy    raNITIdine (ZANTAC) 150 mg tablet Alternate Therapy    terbinafine HCl (LAMISIL) 250 mg tablet Alternate Therapy       Current Outpatient Prescriptions   Medication Sig Dispense Refill    predniSONE (DELTASONE) 10 mg tablet Take 1 Tab by mouth two (2) times a day. 28 Tab 0    cyclobenzaprine (FLEXERIL) 10 mg tablet Take 1 Tab by mouth three (3) times daily as needed for Muscle Spasm(s). 30 Tab 1    lisinopril (PRINIVIL, ZESTRIL) 10 mg tablet Take 1 Tab by mouth daily. 90 Tab 3    ENBREL SURECLICK 50 mg/mL (9.05 mL) injection       fluticasone (FLONASE) 50 mcg/actuation nasal spray USE 2 SPRAYS IN EACH NOSTRIL AT BEDTIME  3    leflunomide (ARAVA) 20 mg tablet TAKE 1 TABLET ONCE A DAY  1    aspirin delayed-release 81 mg tablet Take  by mouth daily.  TURMERIC ROOT EXTRACT PO Take 1,000 mg by mouth.  ascorbic acid, vitamin C, (VITAMIN C) 250 mg tablet Take  by mouth.  omega 3-DHA-EPA-fish oil 1,000 mg (120 mg-180 mg) capsule Take 1 Cap by mouth daily.  multivitamin (ONE A DAY) tablet Take 1 Tab by mouth daily.  loratadine (CLARITIN) 10 mg tablet Take 10 mg by mouth daily.  levothyroxine (SYNTHROID) 50 mcg tablet Take 50 mcg by mouth Daily (before breakfast). Indications: hypothyroidism      cholecalciferol, vitamin D3, (VITAMIN D3) 2,000 unit tab Take 1 Tab by mouth daily. Results for orders placed or performed in visit on 05/03/18   AMB POC COMPLETE CBC,AUTOMATED ENTER   Result Value Ref Range    WBC (POC)  4.1 - 10.9 K/uL    RBC (POC)  4.20 - 6.30 M/uL    HGB (POC)  12.0 - 18.0 g/dL    HCT (POC)  37.0 - 51.0 %    MCV (POC)  80.0 - 97.0 fL    MCH (POC)  26.0 - 32.0 pg    MCHC (POC)  30.0 - 36.0 g/dL    PLATELET (POC)  351.0 - 440.0 K/uL    ABS. LYMPHS (POC)  0.6 - 4.1 K/uL    LYMPHOCYTES (POC)  10.0 - 58.5 %    Mid # (POC)  0.0 - 1.8 K/uL    MID% POC  0.1 - 24.0 %    ABS.  GRANS (POC)  2.0 - 7.8 K/uL    GRANULOCYTES (POC)  37.0 - 92.0 %   AMB POC COMPREHENSIVE METABOLIC PANEL   Result Value Ref Range GLUCOSE  75 - 110 mg/dL    BUN  9 - 20 mg/dL    Creatinine (POC)  0.8 - 1.5 mg/dL    Sodium (POC)  137 - 145 MMOL/L    Potassium (POC)  3.6 - 5.0 MMOL/L    CHLORIDE  98 - 107 MMOL/L    CO2  22 - 32 MMOL/L    CALCIUM  8.4 - 10.2 mg/dL    TOTAL PROTEIN  6.3 - 8.2 g/dL    ALBUMIN  3.9 - 5.4 g/dL    AST (POC)  14 - 36 U/L    ALT (POC)  9 - 52 U/L    ALKALINE PHOS  38 - 126 U/L    TOTAL BILIRUBIN  0.2 - 1.3 mg/dL    eGFR (POC)     AMB POC LIPID PROFILE   Result Value Ref Range    Cholesterol (POC)  0 - 200 mg/dL    Triglycerides (POC)  0 - 200 mg/dL    HDL Cholesterol (POC)  35 - 130 mg/dL    VLDL (POC)  MG/DL    LDL Cholesterol (POC)  0.0 - 130.0 MG/DL    TChol/HDL Ratio (POC)  0.0 - 4.0   AMB POC T4, FREE   Result Value Ref Range    FREE T4 (POC)  0.71 - 1.85 ng/dL   AMB POC TSH   Result Value Ref Range    TSH POC  0.40 - 4.20 uIU/ml   AMB POC URINALYSIS DIP STICK AUTO W/ MICRO    Result Value Ref Range    Color (UA POC)      Clarity (UA POC)      Glucose (UA POC)  Negative    Bilirubin (UA POC)  Negative    Ketones (UA POC)  Negative    Specific gravity (UA POC)  1.010 - 1.025    Blood (UA POC)  Negative    pH (UA POC)  5.0 - 7.0    Protein (UA POC)  Negative    Urobilinogen (UA POC)  0.2 - 1    Nitrites (UA POC)  Negative    Leukocyte esterase (UA POC)  Negative    Epithelial cells (UA POC)      Mucus (UA POC)      WBCs (UA POC)      RBCs (UA POC)      Casts (UA POC)  Negative    Crystals (UA POC)  Negative    Clue Cells (UA POC)      Trichomonas (UA POC)      Yeast (UA POC)      Bacteria (UA POC)  Negative    URINE CULT COMMENT (UA POC)     AMB POC PSA   Result Value Ref Range    PSA (POC)  0.0 - 4.0 ng/mL       Recommended healthy diet low in carbohydrates, fats, sodium and cholesterol. Recommended regular cardiovascular exercise 3-6 times per week for 30-60 minutes daily. Verbal and written instructions (see AVS) provided. Patient expresses understanding of diagnosis and treatment plan.       Manuela Benoit Gerardo Matthews MD

## 2018-05-04 LAB — HCV AB S/CO SERPL IA: <0.1 S/CO RATIO (ref 0–0.9)

## 2018-05-15 DIAGNOSIS — I10 ESSENTIAL HYPERTENSION: ICD-10-CM

## 2018-05-15 RX ORDER — LISINOPRIL 10 MG/1
10 TABLET ORAL DAILY
Qty: 90 TAB | Refills: 3 | Status: SHIPPED | OUTPATIENT
Start: 2018-05-15 | End: 2018-12-13 | Stop reason: SDUPTHER

## 2018-05-15 NOTE — TELEPHONE ENCOUNTER
RX refill request from the patient/pharmacy. Patient last seen 05-03=2018 with labs, and next appt. scheduled for 08-.

## 2018-08-16 ENCOUNTER — OFFICE VISIT (OUTPATIENT)
Dept: INTERNAL MEDICINE CLINIC | Age: 53
End: 2018-08-16

## 2018-08-16 VITALS
DIASTOLIC BLOOD PRESSURE: 88 MMHG | HEART RATE: 81 BPM | SYSTOLIC BLOOD PRESSURE: 138 MMHG | BODY MASS INDEX: 30.35 KG/M2 | HEIGHT: 70 IN | OXYGEN SATURATION: 98 % | WEIGHT: 212 LBS

## 2018-08-16 DIAGNOSIS — E03.9 ACQUIRED HYPOTHYROIDISM: ICD-10-CM

## 2018-08-16 DIAGNOSIS — M05.79 RHEUMATOID ARTHRITIS INVOLVING MULTIPLE SITES WITH POSITIVE RHEUMATOID FACTOR (HCC): ICD-10-CM

## 2018-08-16 DIAGNOSIS — E78.5 DYSLIPIDEMIA: ICD-10-CM

## 2018-08-16 DIAGNOSIS — I10 ESSENTIAL HYPERTENSION: Primary | ICD-10-CM

## 2018-08-16 DIAGNOSIS — E66.09 CLASS 1 OBESITY DUE TO EXCESS CALORIES WITHOUT SERIOUS COMORBIDITY WITH BODY MASS INDEX (BMI) OF 30.0 TO 30.9 IN ADULT: ICD-10-CM

## 2018-08-16 NOTE — MR AVS SNAPSHOT
[address] [id number] [phone] Patient: Zaki Davila MRN: [id number] POI:7/23/0455 Visit Information Date & Time Provider Department Dept. Phone Encounter #  
 8/16/2018  8:00 AM Mamie Andres MD [address] [place] [address] [phone] [id number] Your Appointments 12/13/2018  8:00 AM  
FOLLOW UP 10 with Mamie Andres MD  
[facility] ([facility]) Appt Note: [address] [id number] [address] [address] Upcoming Health Maintenance Date Due DTaP/Tdap/Td series (1 - Tdap) 9/19/1985 Influenza Age 5 to Adult 8/1/2018 COLONOSCOPY 6/20/2028 Allergies as of 8/16/2018  Review Complete On: 8/16/2018 By: Mamie Andres MD  
  
 Severity Noted Reaction Type Reactions Sulfa (Sulfonamide Antibiotics)  01/04/2012    Nausea and Vomiting Current Immunizations  Never Reviewed Name Date Influenza Vaccine 10/1/2016 Not reviewed this visit You Were Diagnosed With   
  
 Codes Comments Essential hypertension    -  Primary ICD-10-CM: I10 
ICD-9-CM: 401.9 Vitals BP Pulse Height(growth percentile) Weight(growth percentile) SpO2 BMI  
 138/88 81 5' 10\" (1.778 m) 212 lb (96.2 kg) 98% 30.42 kg/m2 Smoking Status Never Smoker Vitals History BMI and BSA Data Body Mass Index Body Surface Area  
 30.42 kg/m 2 2.18 m 2 Preferred Pharmacy Pharmacy Name Phone [phone] [facility] [phone] Your Updated Medication List  
  
   
This list is accurate as of 8/16/18  8:34 AM.  Always use your most recent med list.  
  
  
  
  
 aspirin delayed-release 81 mg tablet Take  by mouth daily. CLARITIN 10 mg tablet Generic drug:  loratadine Take 10 mg by mouth daily. cyclobenzaprine 10 mg tablet Commonly known as:  FLEXERIL Take 1 Tab by mouth three (3) times daily as needed for Muscle Spasm(s). ENBREL SURECLICK 50 mg/mL (1.91 mL) injection Generic drug:  etanercept  
  
 fluticasone 50 mcg/actuation nasal spray Commonly known as:  FLONASE  
USE 2 SPRAYS IN EACH NOSTRIL AT BEDTIME  
  
 leflunomide 20 mg tablet Commonly known as:  Maranda Pound TAKE 1 TABLET ONCE A DAY  
  
 levothyroxine 50 mcg tablet Commonly known as:  SYNTHROID Take 50 mcg by mouth Daily (before breakfast). Indications: hypothyroidism  
  
 lisinopril 10 mg tablet Commonly known as:  Victoriabrucee Shows Take 1 Tab by mouth daily. predniSONE 10 mg tablet Commonly known as:  Hollace Ar Take 1 Tab by mouth two (2) times a day. Introducing [facility]! [facility] introduces [email or web address] patient portal. Now you can access parts of your medical record, email your doctor's office, and request medication refills online. 1. In your internet browser, go to [email or web address] 2. Click on the First Time User? Click Here link in the Sign In box. You will see the New Member Sign Up page. 3. Enter your [email or web address] Access Code exactly as it appears below. You will not need to use this code after youve completed the sign-up process. If you do not sign up before the expiration date, you must request a new code. · [email or web address] Access Code: [email or web address] Expires: 11/14/2018  8:34 AM 
 
4. Enter the last four digits of your Social Security Number (xxxx) and Date of Birth (mm/dd/yyyy) as indicated and click Submit. You will be taken to the next sign-up page. 5. Create a MumumÃ­ot ID. This will be your [email or web address] login ID and cannot be changed, so think of one that is secure and easy to remember. 6. Create a Mumum[employer]­ot password. You can change your password at any time. 7. Enter your Password Reset Question and Answer. This can be used at a later time if you forget your password. 8. Enter your e-mail address. You will receive e-mail notification when new information is available in [address] 9. Click Sign Up. You can now view and download portions of your medical record. 10. Click the Download Summary menu link to download a portable copy of your medical information. If you have questions, please visit the Frequently Asked Questions section of the [employer] website. Remember, [employer] is NOT to be used for urgent needs. For medical emergencies, dial 911. Now available from your iPhone and Android! Please provide this summary of care documentation to your next provider. Your primary care clinician is listed as Ludmila. If you have any questions after today's visit, please call [phone].

## 2018-08-16 NOTE — PATIENT INSTRUCTIONS

## 2018-08-16 NOTE — PROGRESS NOTES
Chief Complaint   Patient presents with    Hypertension     3 month f/u       1. Have you been to the ER, urgent care clinic since your last visit? Hospitalized since your last visit? No  2. Have you seen or consulted any other health care providers outside of the [facility] since your last visit? Include any pap smears or colon screening. Yes colonoscopy normal results.

## 2018-08-16 NOTE — PROGRESS NOTES
Chief Complaint   Patient presents with    Hypertension     3 month f/u       SUBJECTIVE:    Keron Chaves is a 47 y.o. male who returns in follow-up of his medical problems include hypertension, dyslipidemia, hypothyroidism, rheumatoid arthritis, mild obesity and other medical problems. Since he last saw me is actually on 3 vacations and when I was last week so he did overeat a little bit but when he is on vacation he does try to follow his diet and get some exercise. He denies any chest pain, shortness breath, palpitations or cardiorespiratory complaints. He denies any GI or  complaints. He has no complete of headaches, dizziness or neurologic complaints. There are no other complaints on complete review of systems. Current Outpatient Prescriptions   Medication Sig Dispense Refill    lisinopril (PRINIVIL, ZESTRIL) 10 mg tablet Take 1 Tab by mouth daily. 90 Tab 3    cyclobenzaprine (FLEXERIL) 10 mg tablet Take 1 Tab by mouth three (3) times daily as needed for Muscle Spasm(s). 30 Tab 1    ENBREL SURECLICK 50 mg/mL (7.72 mL) injection       fluticasone (FLONASE) 50 mcg/actuation nasal spray USE 2 SPRAYS IN EACH NOSTRIL AT BEDTIME  3    leflunomide (ARAVA) 20 mg tablet TAKE 1 TABLET ONCE A DAY  1    aspirin delayed-release 81 mg tablet Take  by mouth daily.  loratadine (CLARITIN) 10 mg tablet Take 10 mg by mouth daily.  levothyroxine (SYNTHROID) 50 mcg tablet Take 50 mcg by mouth Daily (before breakfast). Indications: hypothyroidism      predniSONE (DELTASONE) 10 mg tablet Take 1 Tab by mouth two (2) times a day. 28 Tab 0     Past Medical History:   Diagnosis Date    DDD (degenerative disc disease), lumbar     Elevated BP     1/20/17 on no meds at this time    Hypothyroid     MARYAN on CPAP     RA (rheumatoid arthritis) ([facility] [address]      History reviewed. No pertinent surgical history.   Allergies   Allergen Reactions    Sulfa (Sulfonamide Antibiotics) Nausea and Vomiting       REVIEW OF SYSTEMS:  General: negative for - chills or fever, or weight loss or gain  ENT: negative for - headaches, nasal congestion or tinnitus  Eyes: no blurred or visual changes  Neck: No stiffness or swollen nodes  Respiratory: negative for - cough, hemoptysis, shortness of breath or wheezing  Cardiovascular : negative for - chest pain, edema, palpitations or shortness of breath  Gastrointestinal: negative for - abdominal pain, blood in stools, heartburn or nausea/vomiting  Genito-Urinary: no dysuria, trouble voiding, or hematuria  Musculoskeletal: negative for - gait disturbance, joint pain, joint stiffness or joint swelling  Neurological: no TIA or stroke symptoms  Hematologic: no bruises, no bleeding  Lymphatic: no swollen glands  Integument: no lumps, mole changes, nail changes or rash  Endocrine:no malaise/lethargy poly uria or polydipsia or unexpected weight changes        Social History     Social History    Marital status:      Spouse name: N/A    Number of children: N/A    Years of education: N/A     Social History Main Topics    Smoking status: Never Smoker    Smokeless tobacco: Never Used    Alcohol use No    Drug use: No    Sexual activity: Not Asked     Other Topics Concern    None     Social History Narrative     Family History   Problem Relation Age of Onset    Hypertension Mother        OBJECTIVE:     Visit Vitals    /88    Pulse 81    Ht 5' 10\" (1.778 m)    Wt 212 lb (96.2 kg)    SpO2 98%    BMI 30.42 kg/m2     CONSTITUTIONAL:   well nourished, appears age appropriate  EYES: sclera anicteric, PERRL, EOMI  ENMT:nares clear, moist mucous membranes, pharynx clear  NECK: supple.  Thyroid normal, No JVD or bruits  RESPIRATORY: Chest: clear to ascultation and percussion, normal inspiratory effort  CARDIOVASCULAR: Heart: regular rate and rhythm no murmurs, rubs or gallops, PMI not displaced, No thrills  GASTROINTESTINAL: Abdomen: non distended, soft, non tender, bowel sounds normal  HEMATOLOGIC: no purpura, petechiae or bruising  LYMPHATIC: No lymph node enlargemant  MUSCULOSKELETAL: Extremities: no edema or active synovitis, pulse 1+   INTEGUMENT: No unusual rashes or suspicious skin lesions noted. Nails appear normal.  PERIPHERAL VASCULAR: normal pulses femoral, PT and DP  NEUROLOGIC: non-focal exam, A & O X 3  PSYCHIATRIC:, appropriate affect     ASSESSMENT:   1. Essential hypertension    2. Dyslipidemia    3. Class 1 obesity due to excess calories without serious comorbidity with body mass index (BMI) of 30.0 to 30.9 in adult    4. Acquired hypothyroidism    5. Rheumatoid arthritis involving multiple sites with positive rheumatoid factor (HCC)      Impression  1 hypertension that is controlled continue current therapy reviewed with him  2. Dyslipidemia we will see what that status is was very good on last check  3. Obesity weight is unfortunately up 5 pounds which I discussed diet, exercise and weight reduction for overall health benefit. 4.  Hypothyroidism stable on last check reviewed those numbers  5. Rheumatoid arthritis that is stable  I will call with lab results and make further recommendations or adjustments if necessary. If all is stable we will continue the same and recheck with me again in 3 months or sooner should there be a problem. PLAN:  .  Orders Placed This Encounter    LIPID PANEL         ATTENTION:   This medical record was transcribed using an electronic medical records system. Although proofread, it may and can contain electronic and spelling errors. Other human spelling and other errors may be present. Corrections may be executed at a later time. Please feel free to contact us for any clarifications as needed. Follow-up Disposition:  Return in about 3 months (around 11/16/2018). No results found for any visits on 08/16/18. Buzz Collazo MD    The patient verbalized understanding of the problems and plans as explained.

## 2018-08-17 LAB
CHOLEST SERPL-MCNC: 191 MG/DL (ref 100–199)
HDLC SERPL-MCNC: 45 MG/DL
LDLC SERPL CALC-MCNC: 130 MG/DL (ref 0–99)
TRIGL SERPL-MCNC: 82 MG/DL (ref 0–149)
VLDLC SERPL CALC-MCNC: 16 MG/DL (ref 5–40)

## 2018-09-04 RX ORDER — LEVOTHYROXINE SODIUM 50 UG/1
TABLET ORAL
Qty: 90 TAB | Refills: 3 | Status: SHIPPED | OUTPATIENT
Start: 2018-09-04 | End: 2019-07-22 | Stop reason: SDUPTHER

## 2018-09-04 NOTE — TELEPHONE ENCOUNTER
RX refill request from the patient/pharmacy. Patient last seen 08- with labs, and next appt. scheduled for 12-.

## 2018-12-12 NOTE — PROGRESS NOTES
Chief Complaint   Patient presents with    Hypertension     3 month follow up    Obesity       SUBJECTIVE:    Julianna Camargo is a 46 y.o. male who returns in follow-up for his medical problems include hypertension, hyperlipidemia, obesity, rheumatoid arthritis, allergic rhinitis and other medical problems. Unfortunate is had a lot of stress going on at work and has resulted has become a stress eater and has picked up a lot of weight. He denies any headaches, chest pain, shortness breath, palpitations, PND, orthopnea or cardiorespiratory complaints. He denies any GI or  complaints. He denies any headaches, dizziness or neurologic complaints. No current arthritic complaints and is currently on tolerance for Deanna Steinberg of this and is seems to be doing well. He denies any other complaints on complete review of systems. He has decided to make a career change and is changing jobs at first of the year. Current Outpatient Medications   Medication Sig Dispense Refill    ixekizumab (TALTZ SYRINGE) 80 mg/mL syringe by SubCUTAneous route See Admin Instructions. Inject 160 mg (two 80 mg) syringes at week 0 followed by 80 mg week 2, 4, 8, 10, 12 then 80 mg every 4 weeks.  loratadine (CLARITIN) 10 mg tablet Take 1 Tab by mouth daily. 90 Tab prn    lisinopril (PRINIVIL, ZESTRIL) 20 mg tablet Take 1 Tab by mouth daily. 90 Tab prn    levothyroxine (SYNTHROID) 50 mcg tablet TAKE 1 TABLET BY MOUTH EVERY DAY 90 Tab 3    cyclobenzaprine (FLEXERIL) 10 mg tablet Take 1 Tab by mouth three (3) times daily as needed for Muscle Spasm(s). 30 Tab 1    fluticasone (FLONASE) 50 mcg/actuation nasal spray USE 2 SPRAYS IN EACH NOSTRIL AT BEDTIME  3    leflunomide (ARAVA) 20 mg tablet TAKE 1 TABLET ONCE A DAY  1    aspirin delayed-release 81 mg tablet Take  by mouth daily.        Past Medical History:   Diagnosis Date    DDD (degenerative disc disease), lumbar     Elevated BP     1/20/17 on no meds at this time   [address] [place] [address] Hypothyroid     MARYAN on CPAP     RA (rheumatoid arthritis) ([facility] [address]      History reviewed. No pertinent surgical history. Allergies   Allergen Reactions    Sulfa (Sulfonamide Antibiotics) Nausea and Vomiting       REVIEW OF SYSTEMS:  General: negative for - chills or fever, or weight loss or gain  ENT: negative for - headaches, nasal congestion or tinnitus  Eyes: no blurred or visual changes  Neck: No stiffness or swollen nodes  Respiratory: negative for - cough, hemoptysis, shortness of breath or wheezing  Cardiovascular : negative for - chest pain, edema, palpitations or shortness of breath  Gastrointestinal: negative for - abdominal pain, blood in stools, heartburn or nausea/vomiting  Genito-Urinary: no dysuria, trouble voiding, or hematuria  Musculoskeletal: negative for - gait disturbance, joint pain, joint stiffness or joint swelling  Neurological: no TIA or stroke symptoms  Hematologic: no bruises, no bleeding  Lymphatic: no swollen glands  Integument: no lumps, mole changes, nail changes or rash  Endocrine:no malaise/lethargy poly uria or polydipsia or unexpected weight changes        Social History     Socioeconomic History    Marital status:      Spouse name: Not on file    Number of children: Not on file    Years of education: Not on file    Highest education level: Not on file   Tobacco Use    Smoking status: Never Smoker    Smokeless tobacco: Never Used   Substance and Sexual Activity    Alcohol use: No    Drug use: No     Family History   Problem Relation Age of Onset    Hypertension Mother        OBJECTIVE:     Visit Vitals  BP (!) 156/100   Pulse 90   Resp 18   Ht 5' 10\" (1.778 m)   Wt 227 lb 3.2 oz (103.1 kg)   SpO2 96%   BMI 32.60 kg/m²     CONSTITUTIONAL:   well nourished, appears age appropriate  EYES: sclera anicteric, PERRL, EOMI  ENMT:nares clear, moist mucous membranes, pharynx clear  NECK: supple.  Thyroid normal, No JVD or bruits  RESPIRATORY: Chest: clear to ascultation and percussion, normal inspiratory effort  CARDIOVASCULAR: Heart: regular rate and rhythm no murmurs, rubs or gallops, PMI not displaced, No thrills  GASTROINTESTINAL: Abdomen: non distended, soft, non tender, bowel sounds normal  HEMATOLOGIC: no purpura, petechiae or bruising  LYMPHATIC: No lymph node enlargemant  MUSCULOSKELETAL: Extremities: no edema or active synovitis, pulse 1+   INTEGUMENT: No unusual rashes or suspicious skin lesions noted. Nails appear normal.  PERIPHERAL VASCULAR: normal pulses femoral, PT and DP  NEUROLOGIC: non-focal exam, A & O X 3  PSYCHIATRIC:, appropriate affect     ASSESSMENT:   1. Essential hypertension    2. Dyslipidemia    3. Class 1 obesity due to excess calories without serious comorbidity with body mass index (BMI) of 30.0 to 30.9 in adult    4. Rheumatoid arthritis involving multiple sites with positive rheumatoid factor ([facility] [address]    5. Acquired hypothyroidism      Impression  1. Hypertension is not controlled so we will double his lisinopril with new dose of 20 mg daily. I will recheck him in a month for follow-up of that  2. Dyslipidemia repeat status pending and prior lab reviewed no make adjustments if necessary. 3.  Obesity we discussed diet, exercise and weight reduction for overall health benefit. 4.  Rheumatoid arthritis currently stable  5. Hypothyroidism stable on last check  6. Allergic rhinitis prescription given for Claritin  I will call the lab results and make further recommendations follow-up in 3 months for his general medical problems but in 1 month for his hypertension poorly controlled    PLAN:  .  Orders Placed This Encounter    METABOLIC PANEL, COMPREHENSIVE    LIPID PANEL    CK    ixekizumab (TALTZ SYRINGE) 80 mg/mL syringe    loratadine (CLARITIN) 10 mg tablet    lisinopril (PRINIVIL, ZESTRIL) 20 mg tablet         ATTENTION:   This medical record was transcribed using an electronic medical records system.   Although proofread, it may and can contain electronic and spelling errors. Other human spelling and other errors may be present. Corrections may be executed at a later time. Please feel free to contact us for any clarifications as needed. Follow-up Disposition:  Return in about 4 weeks (around 1/10/2019). No results found for any visits on 12/13/18. Kizzy Can MD    The patient verbalized understanding of the problems and plans as explained.

## 2018-12-13 ENCOUNTER — OFFICE VISIT (OUTPATIENT)
Dept: INTERNAL MEDICINE CLINIC | Age: 53
End: 2018-12-13

## 2018-12-13 VITALS
HEART RATE: 90 BPM | RESPIRATION RATE: 18 BRPM | HEIGHT: 70 IN | DIASTOLIC BLOOD PRESSURE: 100 MMHG | BODY MASS INDEX: 32.53 KG/M2 | OXYGEN SATURATION: 96 % | SYSTOLIC BLOOD PRESSURE: 156 MMHG | WEIGHT: 227.2 LBS

## 2018-12-13 DIAGNOSIS — E66.09 CLASS 1 OBESITY DUE TO EXCESS CALORIES WITHOUT SERIOUS COMORBIDITY WITH BODY MASS INDEX (BMI) OF 30.0 TO 30.9 IN ADULT: ICD-10-CM

## 2018-12-13 DIAGNOSIS — E78.5 DYSLIPIDEMIA: ICD-10-CM

## 2018-12-13 DIAGNOSIS — E03.9 ACQUIRED HYPOTHYROIDISM: ICD-10-CM

## 2018-12-13 DIAGNOSIS — I10 ESSENTIAL HYPERTENSION: Primary | ICD-10-CM

## 2018-12-13 DIAGNOSIS — M05.79 RHEUMATOID ARTHRITIS INVOLVING MULTIPLE SITES WITH POSITIVE RHEUMATOID FACTOR (HCC): ICD-10-CM

## 2018-12-13 RX ORDER — LISINOPRIL 20 MG/1
20 TABLET ORAL DAILY
Qty: 90 TAB | Status: SHIPPED | OUTPATIENT
Start: 2018-12-13 | End: 2020-01-07

## 2018-12-13 RX ORDER — LORATADINE 10 MG/1
10 TABLET ORAL DAILY
Qty: 90 TAB | Status: SHIPPED | OUTPATIENT
Start: 2018-12-13 | End: 2020-01-07

## 2018-12-13 NOTE — PROGRESS NOTES
Chief Complaint   Patient presents with    Hypertension     3 month follow up    Obesity     1. Have you been to the ER, urgent care clinic since your last visit? Hospitalized since your last visit? No    2. Have you seen or consulted any other health care providers outside of the [address] [place] [address] since your last visit? Include any pap smears or colon screening.  No  Visit Vitals  BP (!) 160/92 (BP 1 Location: Left arm, BP Patient Position: Sitting)   Pulse 90   Resp 18   Ht 5' 10\" (1.778 m)   Wt 227 lb 3.2 oz (103.1 kg)   SpO2 96%   BMI 32.60 kg/m²

## 2018-12-13 NOTE — PATIENT INSTRUCTIONS
High Blood Pressure: Care Instructions  Your Care Instructions    If your blood pressure is usually above 130/80, you have high blood pressure, or hypertension. That means the top number is 130 or higher or the bottom number is 80 or higher, or both. Despite what a lot of people think, high blood pressure usually doesn't cause headaches or make you feel dizzy or lightheaded. It usually has no symptoms. But it does increase your risk for heart attack, stroke, and kidney or eye damage. The higher your blood pressure, the more your risk increases. Your doctor will give you a goal for your blood pressure. Your goal will be based on your health and your age. Lifestyle changes, such as eating healthy and being active, are always important to help lower blood pressure. You might also take medicine to reach your blood pressure goal.  Follow-up care is a key part of your treatment and safety. Be sure to make and go to all appointments, and call your doctor if you are having problems. It's also a good idea to know your test results and keep a list of the medicines you take. How can you care for yourself at home? Medical treatment  · If you stop taking your medicine, your blood pressure will go back up. You may take one or more types of medicine to lower your blood pressure. Be safe with medicines. Take your medicine exactly as prescribed. Call your doctor if you think you are having a problem with your medicine. · Talk to your doctor before you start taking aspirin every day. Aspirin can help certain people lower their risk of a heart attack or stroke. But taking aspirin isn't right for everyone, because it can cause serious bleeding. · See your doctor regularly. You may need to see the doctor more often at first or until your blood pressure comes down. · If you are taking blood pressure medicine, talk to your doctor before you take decongestants or anti-inflammatory medicine, such as ibuprofen.  Some of these medicines can raise blood pressure. · Learn how to check your blood pressure at home. Lifestyle changes  · Stay at a healthy weight. This is especially important if you put on weight around the waist. Losing even 10 pounds can help you lower your blood pressure. · If your doctor recommends it, get more exercise. Walking is a good choice. Bit by bit, increase the amount you walk every day. Try for at least 30 minutes on most days of the week. You also may want to swim, bike, or do other activities. · Avoid or limit alcohol. Talk to your doctor about whether you can drink any alcohol. · Try to limit how much sodium you eat to less than 2,300 milligrams (mg) a day. Your doctor may ask you to try to eat less than 1,500 mg a day. · Eat plenty of fruits (such as bananas and oranges), vegetables, legumes, whole grains, and low-fat dairy products. · Lower the amount of saturated fat in your diet. Saturated fat is found in animal products such as milk, cheese, and meat. Limiting these foods may help you lose weight and also lower your risk for heart disease. · Do not smoke. Smoking increases your risk for heart attack and stroke. If you need help quitting, talk to your doctor about stop-smoking programs and medicines. These can increase your chances of quitting for good. When should you call for help? Call 911 anytime you think you may need emergency care. This may mean having symptoms that suggest that your blood pressure is causing a serious heart or blood vessel problem. Your blood pressure may be over 180/120.   For example, call 911 if:    · You have symptoms of a heart attack. These may include:  ? Chest pain or pressure, or a strange feeling in the chest.  ? Sweating. ? Shortness of breath. ? Nausea or vomiting. ? Pain, pressure, or a strange feeling in the back, neck, jaw, or upper belly or in one or both shoulders or arms. ? Lightheadedness or sudden weakness.   ? A fast or irregular heartbeat.     · You have symptoms of a stroke. These may include:  ? Sudden numbness, tingling, weakness, or loss of movement in your face, arm, or leg, especially on only one side of your body. ? Sudden vision changes. ? Sudden trouble speaking. ? Sudden confusion or trouble understanding simple statements. ? Sudden problems with walking or balance. ? A sudden, severe headache that is different from past headaches.     · You have severe back or belly pain.    Do not wait until your blood pressure comes down on its own. Get help right away.   Call your doctor now or seek immediate care if:    · Your blood pressure is much higher than normal (such as 180/120 or higher), but you don't have symptoms.     · You think high blood pressure is causing symptoms, such as:  ? Severe headache.  ? Blurry vision.    Watch closely for changes in your health, and be sure to contact your doctor if:    · Your blood pressure measures higher than your doctor recommends at least 2 times. That means the top number is higher or the bottom number is higher, or both.     · You think you may be having side effects from your blood pressure medicine. Where can you learn more? Go to http://garry-aurora.info/. Enter B430 in the search box to learn more about \"High Blood Pressure: Care Instructions. \"  Current as of: December 6, 2017  Content Version: 11.8  © 5090-3288 [employer]. Care instructions adapted under license by [employer] (which disclaims liability or warranty for this information). If you have questions about a medical condition or this instruction, always ask your healthcare professional. [place] [address] any warranty or liability for your use of this information.

## 2018-12-14 LAB
ALBUMIN SERPL-MCNC: 5.2 G/DL (ref 3.5–5.5)
ALBUMIN/GLOB SERPL: 2.2 {RATIO} (ref 1.2–2.2)
ALP SERPL-CCNC: 69 IU/L (ref 39–117)
ALT SERPL-CCNC: 34 IU/L (ref 0–44)
AST SERPL-CCNC: 28 IU/L (ref 0–40)
BILIRUB SERPL-MCNC: 0.4 MG/DL (ref 0–1.2)
BUN SERPL-MCNC: 22 MG/DL (ref 6–24)
BUN/CREAT SERPL: 21 (ref 9–20)
CALCIUM SERPL-MCNC: 9.8 MG/DL (ref 8.7–10.2)
CHLORIDE SERPL-SCNC: 103 MMOL/L (ref 96–106)
CHOLEST SERPL-MCNC: 210 MG/DL (ref 100–199)
CK SERPL-CCNC: 224 U/L (ref 24–204)
CO2 SERPL-SCNC: 24 MMOL/L (ref 20–29)
CREAT SERPL-MCNC: 1.03 MG/DL (ref 0.76–1.27)
GLOBULIN SER CALC-MCNC: 2.4 G/DL (ref 1.5–4.5)
GLUCOSE SERPL-MCNC: 100 MG/DL (ref 65–99)
HDLC SERPL-MCNC: 45 MG/DL
LDLC SERPL CALC-MCNC: 139 MG/DL (ref 0–99)
POTASSIUM SERPL-SCNC: 5.6 MMOL/L (ref 3.5–5.2)
PROT SERPL-MCNC: 7.6 G/DL (ref 6–8.5)
SODIUM SERPL-SCNC: 143 MMOL/L (ref 134–144)
TRIGL SERPL-MCNC: 131 MG/DL (ref 0–149)
VLDLC SERPL CALC-MCNC: 26 MG/DL (ref 5–40)

## 2018-12-19 NOTE — PROGRESS NOTES
Labs are okay except slight elevation of the LDL cholesterol so watch diet more closely. Patient was sent a note thru my chart.

## 2019-01-08 ENCOUNTER — OFFICE VISIT (OUTPATIENT)
Dept: INTERNAL MEDICINE CLINIC | Age: 54
End: 2019-01-08

## 2019-01-08 VITALS
OXYGEN SATURATION: 98 % | HEIGHT: 70 IN | SYSTOLIC BLOOD PRESSURE: 126 MMHG | BODY MASS INDEX: 33.16 KG/M2 | WEIGHT: 231.6 LBS | RESPIRATION RATE: 16 BRPM | DIASTOLIC BLOOD PRESSURE: 78 MMHG | HEART RATE: 100 BPM

## 2019-01-08 DIAGNOSIS — E66.09 CLASS 1 OBESITY DUE TO EXCESS CALORIES WITHOUT SERIOUS COMORBIDITY WITH BODY MASS INDEX (BMI) OF 30.0 TO 30.9 IN ADULT: ICD-10-CM

## 2019-01-08 DIAGNOSIS — I10 ESSENTIAL HYPERTENSION: Primary | ICD-10-CM

## 2019-01-08 NOTE — PATIENT INSTRUCTIONS
DASH Diet: Care Instructions Your Care Instructions The DASH diet is an eating plan that can help lower your blood pressure. DASH stands for Dietary Approaches to Stop Hypertension. Hypertension is high blood pressure. The DASH diet focuses on eating foods that are high in calcium, potassium, and magnesium. These nutrients can lower blood pressure. The foods that are highest in these nutrients are fruits, vegetables, low-fat dairy products, nuts, seeds, and legumes. But taking calcium, potassium, and magnesium supplements instead of eating foods that are high in those nutrients does not have the same effect. The DASH diet also includes whole grains, fish, and poultry. The DASH diet is one of several lifestyle changes your doctor may recommend to lower your high blood pressure. Your doctor may also want you to decrease the amount of sodium in your diet. Lowering sodium while following the DASH diet can lower blood pressure even further than just the DASH diet alone. Follow-up care is a key part of your treatment and safety. Be sure to make and go to all appointments, and call your doctor if you are having problems. It's also a good idea to know your test results and keep a list of the medicines you take. How can you care for yourself at home? Following the DASH diet · Eat 4 to 5 servings of fruit each day. A serving is 1 medium-sized piece of fruit, ½ cup chopped or canned fruit, 1/4 cup dried fruit, or 4 ounces (½ cup) of fruit juice. Choose fruit more often than fruit juice. · Eat 4 to 5 servings of vegetables each day. A serving is 1 cup of lettuce or raw leafy vegetables, ½ cup of chopped or cooked vegetables, or 4 ounces (½ cup) of vegetable juice. Choose vegetables more often than vegetable juice. · Get 2 to 3 servings of low-fat and fat-free dairy each day. A serving is 8 ounces of milk, 1 cup of yogurt, or 1 ½ ounces of cheese. · Eat 6 to 8 servings of grains each day. A serving is 1 slice of bread, 1 ounce of dry cereal, or ½ cup of cooked rice, pasta, or cooked cereal. Try to choose whole-grain products as much as possible. · Limit lean meat, poultry, and fish to 2 servings each day. A serving is 3 ounces, about the size of a deck of cards. · Eat 4 to 5 servings of nuts, seeds, and legumes (cooked dried beans, lentils, and split peas) each week. A serving is 1/3 cup of nuts, 2 tablespoons of seeds, or ½ cup of cooked beans or peas. · Limit fats and oils to 2 to 3 servings each day. A serving is 1 teaspoon of vegetable oil or 2 tablespoons of salad dressing. · Limit sweets and added sugars to 5 servings or less a week. A serving is 1 tablespoon jelly or jam, ½ cup sorbet, or 1 cup of lemonade. · Eat less than 2,300 milligrams (mg) of sodium a day. If you limit your sodium to 1,500 mg a day, you can lower your blood pressure even more. Tips for success · Start small. Do not try to make dramatic changes to your diet all at once. You might feel that you are missing out on your favorite foods and then be more likely to not follow the plan. Make small changes, and stick with them. Once those changes become habit, add a few more changes. · Try some of the following: ? Make it a goal to eat a fruit or vegetable at every meal and at snacks. This will make it easy to get the recommended amount of fruits and vegetables each day. ? Try yogurt topped with fruit and nuts for a snack or healthy dessert. ? Add lettuce, tomato, cucumber, and onion to sandwiches. ? Combine a ready-made pizza crust with low-fat mozzarella cheese and lots of vegetable toppings. Try using tomatoes, squash, spinach, broccoli, carrots, cauliflower, and onions. ? Have a variety of cut-up vegetables with a low-fat dip as an appetizer instead of chips and dip. ? Sprinkle sunflower seeds or chopped almonds over salads.  Or try adding chopped walnuts or almonds to cooked vegetables. ? Try some vegetarian meals using beans and peas. Add garbanzo or kidney beans to salads. Make burritos and tacos with mashed schmidt beans or black beans. Where can you learn more? Go to http://garry-aurora.info/. Enter N603 in the search box to learn more about \"DASH Diet: Care Instructions. \" Current as of: December 6, 2017 Content Version: 11.8 © 9087-2820 [employer]. Care instructions adapted under license by [employer] (which disclaims liability or warranty for this information). If you have questions about a medical condition or this instruction, always ask your healthcare professional. [address] any warranty or liability for your use of this information.

## 2019-01-08 NOTE — PROGRESS NOTES
Chief Complaint Patient presents with  Hypertension 1 month follow up SUBJECTIVE: 
 
Nicole Larson is a 46 y.o. male who returns in f/u of his hypertension after being seen a month ago when his BP was 156/100 and Lisinopril was increased from 10 mg to 20 mg QD. He has been taking the increased medicine dose without any problems. He did get a little off his diet over the holidays and gained some weight but as of yesterday he started back on his diet and started on a walking program.  He currently denies any chest pain, shortness of breath, palpitations, PND, orthopnea or cardiorespiratory complaints of any type. He denies any GI or  complaints. He denies any headaches, dizziness or neurologic complaints. There are no other complaints noted. Current Outpatient Medications Medication Sig Dispense Refill  ixekizumab (TALTZ SYRINGE) 80 mg/mL syringe by SubCUTAneous route See Admin Instructions. Inject 160 mg (two 80 mg) syringes at week 0 followed by 80 mg week 2, 4, 8, 10, 12 then 80 mg every 4 weeks.  loratadine (CLARITIN) 10 mg tablet Take 1 Tab by mouth daily. 90 Tab prn  lisinopril (PRINIVIL, ZESTRIL) 20 mg tablet Take 1 Tab by mouth daily. 90 Tab prn  levothyroxine (SYNTHROID) 50 mcg tablet TAKE 1 TABLET BY MOUTH EVERY DAY 90 Tab 3  cyclobenzaprine (FLEXERIL) 10 mg tablet Take 1 Tab by mouth three (3) times daily as needed for Muscle Spasm(s). 30 Tab 1  
 fluticasone (FLONASE) 50 mcg/actuation nasal spray USE 2 SPRAYS IN EACH NOSTRIL AT BEDTIME  3  
 leflunomide (ARAVA) 20 mg tablet TAKE 1 TABLET ONCE A DAY  1  
 aspirin delayed-release 81 mg tablet Take  by mouth daily. Past Medical History:  
Diagnosis Date  DDD (degenerative disc disease), lumbar  Elevated BP   
 1/20/17 on no meds at this time  Hypothyroid  MARYAN on CPAP   
 [facility] History reviewed. No pertinent surgical history. Allergies Allergen Reactions  Sulfa (Sulfonamide Antibiotics) Nausea and Vomiting REVIEW OF SYSTEMS: 
General: negative for - chills or fever, or weight loss or gain ENT: negative for - headaches, nasal congestion or tinnitus Eyes: no blurred or visual changes Neck: No stiffness or swollen nodes Respiratory: negative for - cough, hemoptysis, shortness of breath or wheezing Cardiovascular : negative for - chest pain, edema, palpitations or shortness of breath Gastrointestinal: negative for - abdominal pain, blood in stools, heartburn or nausea/vomiting Genito-Urinary: no dysuria, trouble voiding, or hematuria Musculoskeletal: negative for - gait disturbance, joint pain, joint stiffness or joint swelling Neurological: no TIA or stroke symptoms Hematologic: no bruises, no bleeding Lymphatic: no swollen glands Integument: no lumps, mole changes, nail changes or rash Endocrine:no malaise/lethargy poly uria or polydipsia or unexpected weight changes Social History Socioeconomic History  Marital status:  Spouse name: Not on file  Number of children: Not on file  Years of education: Not on file  Highest education level: Not on file Tobacco Use  Smoking status: Never Smoker  Smokeless tobacco: Never Used Substance and Sexual Activity  Alcohol use: No  
 Drug use: No  
 
Family History Problem Relation Age of Onset  Hypertension Mother OBJECTIVE:  
 
Visit Vitals /78 (BP 1 Location: Left arm, BP Patient Position: Sitting) Pulse 100 Resp 16 Ht 5' 10\" (1.778 m) Wt 231 lb 9.6 oz (105.1 kg) SpO2 98% BMI 33.23 kg/m² CONSTITUTIONAL:   well nourished, appears age appropriate EYES: sclera anicteric, PERRL, EOMI 
ENMT:nares clear, moist mucous membranes, pharynx clear NECK: supple. Thyroid normal, No JVD or bruits RESPIRATORY: Chest: clear to ascultation and percussion, normal inspiratory effort CARDIOVASCULAR: Heart: regular rate and rhythm no murmurs, rubs or gallops, PMI not displaced, No thrills GASTROINTESTINAL: Abdomen: non distended, soft, non tender, bowel sounds normal 
HEMATOLOGIC: no purpura, petechiae or bruising LYMPHATIC: No lymph node enlargemant MUSCULOSKELETAL: Extremities: no edema or active synovitis, pulse 1+ INTEGUMENT: No unusual rashes or suspicious skin lesions noted. Nails appear normal. 
PERIPHERAL VASCULAR: normal pulses femoral, PT and DP NEUROLOGIC: non-focal exam, A & O X 3 PSYCHIATRIC:, appropriate affect ASSESSMENT:  
1. Essential hypertension 2. Class 1 obesity due to excess calories without serious comorbidity with body mass index (BMI) of 30.0 to 30.9 in adult Impression 1. Hypertension that is now controlled with the increase lisinopril dose will continue same. 2.  Obesity unfortunate his weight did go up a little over the holidays so we discussed diet, exercise and weight reduction for overall health benefit. I will recheck him again in about 2 months at which time we will check fasting blood work. I will see him sooner if there is a problem. PLAN: 
. No orders of the defined types were placed in this encounter. ATTENTION:  
This medical record was transcribed using an electronic medical records system. Although proofread, it may and can contain electronic and spelling errors. Other human spelling and other errors may be present. Corrections may be executed at a later time. Please feel free to contact us for any clarifications as needed. Follow-up Disposition: 
Return in about 2 months (around 3/8/2019). No results found for any visits on 01/08/19. Shruthi Puentes MD 
 
The patient verbalized understanding of the problems and plans as explained.

## 2019-01-18 RX ORDER — OSELTAMIVIR PHOSPHATE 75 MG/1
75 CAPSULE ORAL DAILY
Qty: 10 CAP | Refills: 0 | Status: SHIPPED | OUTPATIENT
Start: 2019-01-18 | End: 2019-01-28

## 2019-01-18 NOTE — TELEPHONE ENCOUNTER
RX refill request from the patient/pharmacy. Patient last seen 12- with labs, and next appt. scheduled for 03-  Requested Prescriptions     Pending Prescriptions Disp Refills    oseltamivir (TAMIFLU) 75 mg capsule 10 Cap 0     Sig: Take 1 Cap by mouth daily for 10 days. Gina Maldonado

## 2019-02-25 ENCOUNTER — OFFICE VISIT (OUTPATIENT)
Dept: INTERNAL MEDICINE CLINIC | Age: 54
End: 2019-02-25

## 2019-02-25 VITALS
RESPIRATION RATE: 19 BRPM | HEART RATE: 95 BPM | DIASTOLIC BLOOD PRESSURE: 86 MMHG | HEIGHT: 70 IN | TEMPERATURE: 98.5 F | WEIGHT: 228.2 LBS | SYSTOLIC BLOOD PRESSURE: 124 MMHG | OXYGEN SATURATION: 98 % | BODY MASS INDEX: 32.67 KG/M2

## 2019-02-25 DIAGNOSIS — E66.09 CLASS 1 OBESITY DUE TO EXCESS CALORIES WITHOUT SERIOUS COMORBIDITY WITH BODY MASS INDEX (BMI) OF 30.0 TO 30.9 IN ADULT: ICD-10-CM

## 2019-02-25 DIAGNOSIS — I10 ESSENTIAL HYPERTENSION: Primary | ICD-10-CM

## 2019-02-25 PROBLEM — J30.89 NON-SEASONAL ALLERGIC RHINITIS: Status: ACTIVE | Noted: 2019-02-25

## 2019-02-25 NOTE — PATIENT INSTRUCTIONS
Body Mass Index: Care Instructions Your Care Instructions Body mass index (BMI) can help you see if your weight is raising your risk for health problems. It uses a formula to compare how much you weigh with how tall you are. · A BMI lower than 18.5 is considered underweight. · A BMI between 18.5 and 24.9 is considered healthy. · A BMI between 25 and 29.9 is considered overweight. A BMI of 30 or higher is considered obese. If your BMI is in the normal range, it means that you have a lower risk for weight-related health problems. If your BMI is in the overweight or obese range, you may be at increased risk for weight-related health problems, such as high blood pressure, heart disease, stroke, arthritis or joint pain, and diabetes. If your BMI is in the underweight range, you may be at increased risk for health problems such as fatigue, lower protection (immunity) against illness, muscle loss, bone loss, hair loss, and hormone problems. BMI is just one measure of your risk for weight-related health problems. You may be at higher risk for health problems if you are not active, you eat an unhealthy diet, or you drink too much alcohol or use tobacco products. Follow-up care is a key part of your treatment and safety. Be sure to make and go to all appointments, and call your doctor if you are having problems. It's also a good idea to know your test results and keep a list of the medicines you take. How can you care for yourself at home? · Practice healthy eating habits. This includes eating plenty of fruits, vegetables, whole grains, lean protein, and low-fat dairy. · If your doctor recommends it, get more exercise. Walking is a good choice. Bit by bit, increase the amount you walk every day. Try for at least 30 minutes on most days of the week. · Do not smoke. Smoking can increase your risk for health problems.  If you need help quitting, talk to your doctor about stop-smoking programs and medicines. These can increase your chances of quitting for good. · Limit alcohol to 2 drinks a day for men and 1 drink a day for women. Too much alcohol can cause health problems. If you have a BMI higher than 25 · Your doctor may do other tests to check your risk for weight-related health problems. This may include measuring the distance around your waist. A waist measurement of more than 40 inches in men or 35 inches in women can increase the risk of weight-related health problems. · Talk with your doctor about steps you can take to stay healthy or improve your health. You may need to make lifestyle changes to lose weight and stay healthy, such as changing your diet and getting regular exercise. If you have a BMI lower than 18.5 · Your doctor may do other tests to check your risk for health problems. · Talk with your doctor about steps you can take to stay healthy or improve your health. You may need to make lifestyle changes to gain or maintain weight and stay healthy, such as getting more healthy foods in your diet and doing exercises to build muscle. Where can you learn more? Go to http://garry-aurora.info/. Enter S176 in the search box to learn more about \"Body Mass Index: Care Instructions. \" Current as of: June 25, 2018 Content Version: 11.9 © 3010-2641 [employer]. Care instructions adapted under license by [employer] (which disclaims liability or warranty for this information). If you have questions about a medical condition or this instruction, always ask your healthcare professional. [address] any warranty or liability for your use of this information.

## 2019-02-25 NOTE — PROGRESS NOTES
Subjective:  
Christa Eldridge is a 46 y.o. male Chief Complaint Patient presents with  Sinus Infection  
  dizzy, light headed, low energy History of present illness: He presents complaint a lot of sinus congestion some dizzy lightheaded sensation and low energy is wondered could be related his blood pressure being too low. He notes no color to the drainage does not do much when he blows his nose. His main concern was to make sure his blood pressure is not low. There are no other specific complaints. Patient Active Problem List  
Diagnosis Code  Acquired hypothyroidism E03.9  Essential hypertension I10  Rheumatoid arthritis involving multiple sites with positive rheumatoid factor (HCC) M05.79  
 Class 1 obesity due to excess calories without serious comorbidity with body mass index (BMI) of 30.0 to 30.9 in adult E66.09, Z68.30  Allergic dermatitis L23.9  Annual physical exam Z00.00  Dyslipidemia E78.5  Non-seasonal allergic rhinitis J30.89 Past Medical History:  
Diagnosis Date  DDD (degenerative disc disease), lumbar  Elevated BP   
 1/20/17 on no meds at this time  Hypothyroid  MARYAN on CPAP   
 RA (rheumatoid arthritis) ([facility] Allergies Allergen Reactions  Sulfa (Sulfonamide Antibiotics) Nausea and Vomiting Family History Problem Relation Age of Onset  Hypertension Mother Social History Socioeconomic History  Marital status:  Spouse name: Not on file  Number of children: Not on file  Years of education: Not on file  Highest education level: Not on file Social Needs  Financial resource strain: Not on file  Food insecurity - worry: Not on file  Food insecurity - inability: Not on file  Transportation needs - medical: Not on file  Transportation needs - non-medical: Not on file Occupational History  Not on file Tobacco Use  Smoking status: Never Smoker  Smokeless tobacco: Never Used Substance and Sexual Activity  Alcohol use: No  
 Drug use: No  
 Sexual activity: Not on file Other Topics Concern  Not on file Social History Narrative  Not on file Prior to Admission medications Medication Sig Start Date End Date Taking? Authorizing Provider  
ixekizumab (TALTZ SYRINGE) 80 mg/mL syringe by SubCUTAneous route See Admin Instructions. Inject 160 mg (two 80 mg) syringes at week 0 followed by 80 mg week 2, 4, 8, 10, 12 then 80 mg every 4 weeks. Yes Provider, Historical  
loratadine (CLARITIN) 10 mg tablet Take 1 Tab by mouth daily. 12/13/18  Yes Trey Mann MD  
lisinopril (PRINIVIL, ZESTRIL) 20 mg tablet Take 1 Tab by mouth daily. 12/13/18  Yes Trey Mann MD  
levothyroxine (SYNTHROID) 50 mcg tablet TAKE 1 TABLET BY MOUTH EVERY DAY 9/4/18  Yes Mino Jordan, NP  
cyclobenzaprine (FLEXERIL) 10 mg tablet Take 1 Tab by mouth three (3) times daily as needed for Muscle Spasm(s). 5/3/18  Yes Trey Mann MD  
fluticasone Nellene Wittman) 50 mcg/actuation nasal spray USE 2 SPRAYS IN EACH NOSTRIL AT BEDTIME 3/18/18  Yes Provider, Historical  
leflunomide (ARAVA) 20 mg tablet TAKE 1 TABLET ONCE A DAY 2/5/18  Yes Provider, Historical  
aspirin delayed-release 81 mg tablet Take  by mouth daily. Yes Provider, Historical  
  
 
Review of Systems Constitutional:  He denies fever, weight loss, sweats or fatigue. EYES: No blurred or double vision, ENT: no nasal congestion, no headache. No difficulty with swallowing, taste, speech or smell. Some dizziness and lightheadedness Respiratory:  No cough, wheezing or shortness of breath. No sputum production. Cardiac:  Denies chest pain, palpitations, unexplained indigestion, syncope, edema, PND or orthopnea. GI:  No changes in bowel movements, no abdominal pain, no bloating, anorexia, nausea, vomiting or heartburn. :  No frequency or dysuria. Denies incontinence or sexual dysfunction. Extremities:  No joint pain, stiffness or swelling Back:.no pain or soreness Skin:  No recent rashes or mole changes. Neurological:  No numbness, tingling, burning paresthesias or loss of motor strength. No syncope, dizziness, frequent headaches or memory loss. Hematologic:  No easy bruising Lymphatic: No lymph node enlargement Objective:  
 
Vitals:  
 02/25/19 1454 02/25/19 1522 BP: 122/90 124/86 Pulse: 95 Resp: 19 Temp: 98.5 °F (36.9 °C) TempSrc: Oral   
SpO2: 98% Weight: 228 lb 3.2 oz (103.5 kg) Height: 5' 10\" (1.778 m) PainSc:   0 - No pain Body mass index is 32.74 kg/m². Physical Examination:  
           General Appearance:  Well-developed, well-nourished, no acute distress. HEENT:   
  Ears:  The TMs and ear canals were clear. Eyes:  The pupillary responses were normal.  Extraocular muscle function intact. Lids and conjunctiva not injected. Funduscopic exam revealed sharp disc margins. Nares: Clear w/o edema or erythema Pharynx:  Clear with teeth in good repair. No masses were noted. Neck:  Supple without thyromegaly or adenopathy. No JVD noted. No carotid                bruits. Lungs:  Clear to auscultation and percussion. Cardiac:  Regular rate and rhythm without murmur. PMI not displaced. No gallop, rub or click. Abdominal: Soft, non-tender, no hepata-spleenomegally or masses Extremities:  No clubbing, cyanosis or edema. Skin:  No rash or unusual mole changes noted. Lymph Nodes:  None felt in the cervical, supraclavicular, axillary or inguinal region. Neurological: . DTRs 2+ and symmetric. Station and gait normal.  
Hematologic:   No purpura or petechiae Assessment/Plan: 1. Essential hypertension 2. Class 1 obesity due to excess calories without serious comorbidity with body mass index (BMI) of 30.0 to 30.9 in adult Impressions/Plan: 
Pression 1. Hypertension is controlled to continue current therapy reviewed with him 2. Obesity we discussed diet, exercise and weight reduction his weight is down 3 pounds 3. Allergic rhinitis we discussed treatment options and he will continue what he is doing at this point I do not think his blood pressure is too low and I would not change his blood pressure medication his blood pressure being well controlled Recheck as previously scheduled. No orders of the defined types were placed in this encounter. Follow-up Disposition: 
Return in about 4 weeks (around 3/25/2019). No results found for any visits on 02/25/19. Sushma Silvestre MD 
 
The patient was given after the visit summary the patient verbalized an understanding of the plans and problems as explained.

## 2019-02-25 NOTE — PROGRESS NOTES
Christa Eldridge  Identified pt with two pt identifiers(name and ). Chief Complaint Patient presents with  Sinus Infection  
  dizzy, light headed, low energy 1. Have you been to the ER, urgent care clinic since your last visit? Hospitalized since your last visit? No 
 
2. Have you seen or consulted any other health care providers outside of the [address] [place] [address] since your last visit? Include any pap smears or colon screening. No 
 
 
Health Maintenance Topics with due status: Overdue Topic Date Due DTaP/Tdap/Td series 1985 Shingrix Vaccine Age 50> 2014 Health Maintenance Topics with due status: Not Due Topic Last Completion Date COLONOSCOPY 2018 Health Maintenance Topics with due status: Completed Topic Last Completion Date Hepatitis C Screening 2018 Influenza Age 5 to Adult 2018 Medication reconciliation up to date and corrected with patient at this time. Today's provider has been notified of reason for visit, vitals and flowsheets obtained on patients. Reviewed record in preparation for visit, huddled with provider and have obtained necessary documentation. Wt Readings from Last 3 Encounters:  
19 228 lb 3.2 oz (103.5 kg) 19 231 lb 9.6 oz (105.1 kg) 18 227 lb 3.2 oz (103.1 kg) Temp Readings from Last 3 Encounters:  
19 98.5 °F (36.9 °C) (Oral) 18 98.2 °F (36.8 °C) (Oral) 18 98.6 °F (37 °C) (Oral) BP Readings from Last 3 Encounters:  
19 122/90  
19 126/78  
18 (!) 156/100 Pulse Readings from Last 3 Encounters:  
19 95  
19 100  
18 90 Vitals:  
 19 1454 BP: 122/90 Pulse: 95 Resp: 19 Temp: 98.5 °F (36.9 °C) TempSrc: Oral  
SpO2: 98% Weight: 228 lb 3.2 oz (103.5 kg) Height: 5' 10\" (1.778 m) PainSc:   0 - No pain Learning Assessment: 
:  
 
Learning Assessment 5/3/2018 PRIMARY LEARNER Patient HIGHEST LEVEL OF EDUCATION - PRIMARY LEARNER  GRADUATED HIGH SCHOOL OR GED  
BARRIERS PRIMARY LEARNER NONE  
CO-LEARNER CAREGIVER No  
CO-LEARNER NAME no PRIMARY LANGUAGE ENGLISH  
LEARNER PREFERENCE PRIMARY DEMONSTRATION  
ANSWERED BY self RELATIONSHIP SELF Depression Screening: 
:  
 
3 most recent PHQ Screens 1/8/2019 Little interest or pleasure in doing things Not at all Feeling down, depressed, irritable, or hopeless Not at all Total Score PHQ 2 0 No flowsheet data found. Fall Risk Assessment: 
:  
 
No flowsheet data found. Abuse Screening: 
:  
 
Abuse Screening Questionnaire 12/13/2018 Do you ever feel afraid of your partner? Asher Boykin Are you in a relationship with someone who physically or mentally threatens you? Asher Boykin Is it safe for you to go home?  Humaira Loya

## 2019-04-09 ENCOUNTER — OFFICE VISIT (OUTPATIENT)
Dept: INTERNAL MEDICINE CLINIC | Age: 54
End: 2019-04-09

## 2019-04-09 VITALS
WEIGHT: 228 LBS | OXYGEN SATURATION: 94 % | RESPIRATION RATE: 18 BRPM | HEART RATE: 104 BPM | SYSTOLIC BLOOD PRESSURE: 146 MMHG | TEMPERATURE: 98.7 F | DIASTOLIC BLOOD PRESSURE: 90 MMHG | BODY MASS INDEX: 32.64 KG/M2 | HEIGHT: 70 IN

## 2019-04-09 DIAGNOSIS — R53.83 FATIGUE, UNSPECIFIED TYPE: Primary | ICD-10-CM

## 2019-04-09 DIAGNOSIS — M05.79 RHEUMATOID ARTHRITIS INVOLVING MULTIPLE SITES WITH POSITIVE RHEUMATOID FACTOR (HCC): ICD-10-CM

## 2019-04-09 DIAGNOSIS — I10 ESSENTIAL HYPERTENSION: ICD-10-CM

## 2019-04-09 DIAGNOSIS — E66.09 CLASS 1 OBESITY DUE TO EXCESS CALORIES WITHOUT SERIOUS COMORBIDITY WITH BODY MASS INDEX (BMI) OF 30.0 TO 30.9 IN ADULT: ICD-10-CM

## 2019-04-09 LAB
A-G RATIO,AGRAT: 1.6 RATIO
ALBUMIN SERPL-MCNC: 4.8 G/DL (ref 3.9–5.4)
ALP SERPL-CCNC: 67 U/L (ref 38–126)
ALT SERPL-CCNC: 29 U/L (ref 9–52)
ANION GAP SERPL CALC-SCNC: 19 MMOL/L
AST SERPL W P-5'-P-CCNC: 31 U/L (ref 14–36)
BILIRUB SERPL-MCNC: 0.4 MG/DL (ref 0.2–1.3)
BUN SERPL-MCNC: 21 MG/DL (ref 9–20)
BUN/CREATININE RATIO,BUCR: 23 RATIO
CALCIUM SERPL-MCNC: 9.6 MG/DL (ref 8.4–10.2)
CHLORIDE SERPL-SCNC: 101 MMOL/L (ref 98–107)
CO2 SERPL-SCNC: 26 MMOL/L (ref 22–32)
CREAT SERPL-MCNC: 0.9 MG/DL (ref 0.8–1.5)
ERYTHROCYTE [DISTWIDTH] IN BLOOD BY AUTOMATED COUNT: 13.3 %
GLOBULIN,GLOB: 3
GLUCOSE SERPL-MCNC: 69 MG/DL (ref 75–110)
HCT VFR BLD AUTO: 47.5 % (ref 37–51)
HGB BLD-MCNC: 16.1 G/DL (ref 12–18)
LYMPHOCYTES ABSOLUTE: 1.7 K/UL (ref 0.6–4.1)
LYMPHOCYTES NFR BLD: 18.6 % (ref 10–58.5)
MCH RBC QN AUTO: 32.3 PG (ref 26–32)
MCHC RBC AUTO-ENTMCNC: 33.9 G/DL (ref 30–36)
MCV RBC AUTO: 95.3 FL (ref 80–97)
MONOCYTES ABS-DIF,2141: 0.7 K/UL (ref 0–1.8)
MONOCYTES NFR BLD: 7.4 % (ref 0.1–24)
NEUTROPHILS # BLD: 74 % (ref 37–92)
NEUTROPHILS ABS,2156: 6.6 K/UL (ref 2–7.8)
PLATELET # BLD AUTO: 340 K/UL (ref 140–440)
PMV BLD AUTO: 7.4 FL
POTASSIUM SERPL-SCNC: 4.1 MMOL/L (ref 3.6–5)
PROT SERPL-MCNC: 7.8 G/DL (ref 6.3–8.2)
RBC # BLD AUTO: 4.98 M/UL (ref 4.2–6.3)
SODIUM SERPL-SCNC: 146 MMOL/L (ref 137–145)
WBC # BLD AUTO: 8.9 K/UL (ref 4.1–10.9)

## 2019-04-09 RX ORDER — AMOXICILLIN 875 MG/1
TABLET, FILM COATED ORAL
COMMUNITY
Start: 2019-04-03 | End: 2020-04-14 | Stop reason: ALTCHOICE

## 2019-04-09 NOTE — PROGRESS NOTES
Chief Complaint Patient presents with  Fatigue  
  x several months Visit Vitals /90 (BP 1 Location: Left arm, BP Patient Position: Sitting) Pulse (!) 104 Temp 98.7 °F (37.1 °C) (Oral) Resp 18 Ht 5' 10\" (1.778 m) Wt 228 lb (103.4 kg) SpO2 94% BMI 32.71 kg/m² 1. Have you been to the ER, urgent care clinic since your last visit? Hospitalized since your last visit//?Pt first 4/3/19 
 
2. Have you seen or consulted any other health care providers outside of the [address] [place] [address] since your last visit? Include any pap smears or colon screening.  No

## 2019-04-09 NOTE — PROGRESS NOTES
Subjective:  
Keron Chaves is a 46 y.o. male Chief Complaint Patient presents with  Fatigue  
  x several months History of present illness: He presents today complaining of fatigue is been present now for several months to the time that he had some medicine changes at which time he was started on Taltz for his rheumatoid arthritis and he is continued on his Norma. Also at that time he was changed from over-the-counter Claritin to prescription Claritin and we increase his lisinopril from 10 mg to 20 mg daily. He notes he had overwhelming fatigue since those times and is wondering what could be causing it. He notes no nausea vomiting. He is noted no fevers or chills. He has noted no change in his appetite. He has noted no night sweats. There are no other specific complaints other than just fatigue. He is due to see his rheumatologist tomorrow. Patient Active Problem List  
Diagnosis Code  Acquired hypothyroidism E03.9  Essential hypertension I10  Rheumatoid arthritis involving multiple sites with positive rheumatoid factor (HCC) M05.79  
 Class 1 obesity due to excess calories without serious comorbidity with body mass index (BMI) of 30.0 to 30.9 in adult E66.09, Z68.30  Allergic dermatitis L23.9  Annual physical exam Z00.00  Dyslipidemia E78.5  Non-seasonal allergic rhinitis J30.89  Fatigue R53.83 Past Medical History:  
Diagnosis Date  DDD (degenerative disc disease), lumbar  Elevated BP   
 1/20/17 on no meds at this time  Hypothyroid  MARYAN on CPAP   
 RA (rheumatoid arthritis) ([facility] [address] Allergies Allergen Reactions  Sulfa (Sulfonamide Antibiotics) Nausea and Vomiting Family History Problem Relation Age of Onset  Hypertension Mother Social History Socioeconomic History  Marital status:  Spouse name: Not on file  Number of children: Not on file  Years of education: Not on file  Highest education level: Not on file Occupational History  Not on file Social Needs  Financial resource strain: Not on file  Food insecurity:  
  Worry: Not on file Inability: Not on file  Transportation needs:  
  Medical: Not on file Non-medical: Not on file Tobacco Use  Smoking status: Never Smoker  Smokeless tobacco: Never Used Substance and Sexual Activity  Alcohol use: No  
 Drug use: No  
 Sexual activity: Not on file Lifestyle  Physical activity:  
  Days per week: Not on file Minutes per session: Not on file  Stress: Not on file Relationships  Social connections:  
  Talks on phone: Not on file Gets together: Not on file Attends Taoist service: Not on file Active member of club or organization: Not on file Attends meetings of clubs or organizations: Not on file Relationship status: Not on file  Intimate partner violence:  
  Fear of current or ex partner: Not on file Emotionally abused: Not on file Physically abused: Not on file Forced sexual activity: Not on file Other Topics Concern  Not on file Social History Narrative  Not on file Prior to Admission medications Medication Sig Start Date End Date Taking? Authorizing Provider  
amoxicillin (AMOXIL) 875 mg tablet  4/3/19  Yes Provider, Historical  
ixekizumab (TALTZ SYRINGE) 80 mg/mL syringe by SubCUTAneous route See Admin Instructions. Inject 160 mg (two 80 mg) syringes at week 0 followed by 80 mg week 2, 4, 8, 10, 12 then 80 mg every 4 weeks. Yes Provider, Historical  
loratadine (CLARITIN) 10 mg tablet Take 1 Tab by mouth daily. 12/13/18  Yes Petrona Conti MD  
lisinopril (PRINIVIL, ZESTRIL) 20 mg tablet Take 1 Tab by mouth daily.  12/13/18  Yes Petrona Conti MD  
levothyroxine (SYNTHROID) 50 mcg tablet TAKE 1 TABLET BY MOUTH EVERY DAY 9/4/18  Yes Ryan Yoon NP  
 cyclobenzaprine (FLEXERIL) 10 mg tablet Take 1 Tab by mouth three (3) times daily as needed for Muscle Spasm(s). 5/3/18  Yes Dez Amador MD  
fluticasone [facility]) 50 mcg/actuation nasal spray USE 2 SPRAYS IN EACH NOSTRIL AT BEDTIME 3/18/18  Yes Provider, Historical  
leflunomide (ARAVA) 20 mg tablet TAKE 1 TABLET ONCE A DAY 2/5/18  Yes Provider, Historical  
aspirin delayed-release 81 mg tablet Take  by mouth daily. Yes Provider, Historical  
  
 
Review of Systems Constitutional:  He denies fever, weight loss, sweats. Positive generalized fatigue EYES: No blurred or double vision, ENT: no nasal congestion, no headache or dizziness. No difficulty with               swallowing, taste, speech or smell. Respiratory:  No cough, wheezing or shortness of breath. No sputum production. Cardiac:  Denies chest pain, palpitations, unexplained indigestion, syncope, edema, PND or orthopnea. GI:  No changes in bowel movements, no abdominal pain, no bloating, anorexia, nausea, vomiting or heartburn. :  No frequency or dysuria. Denies incontinence or sexual dysfunction. Extremities:  No joint pain, stiffness or swelling Back:.no pain or soreness Skin:  No recent rashes or mole changes. Neurological:  No numbness, tingling, burning paresthesias or loss of motor strength. No syncope, dizziness, frequent headaches or memory loss. Hematologic:  No easy bruising Lymphatic: No lymph node enlargement Objective:  
 
Vitals:  
 04/09/19 1614 BP: 146/90 Pulse: (!) 104 Resp: 18 Temp: 98.7 °F (37.1 °C) TempSrc: Oral  
SpO2: 94% Weight: 228 lb (103.4 kg) Height: 5' 10\" (1.778 m) PainSc:   0 - No pain Body mass index is 32.71 kg/m². Physical Examination:  
           General Appearance:  Well-developed, well-nourished, no acute distress. HEENT:   
  Ears:  The TMs and ear canals were clear. Eyes:  The pupillary responses were normal.  Extraocular muscle function intact. Lids and conjunctiva not injected. Funduscopic exam revealed sharp disc margins. Nares: Clear w/o edema or erythema Pharynx:  Clear with teeth in good repair. No masses were noted. Neck:  Supple without thyromegaly or adenopathy. No JVD noted. No carotid                bruits. Lungs:  Clear to auscultation and percussion. Cardiac:  Regular rate and rhythm without murmur. PMI not displaced. No gallop, rub or click. Abdominal: Soft, non-tender, no hepata-spleenomegally or masses Extremities:  No clubbing, cyanosis or edema. Skin:  No rash or unusual mole changes noted. Lymph Nodes:  None felt in the cervical, supraclavicular, axillary or inguinal region. Neurological: . DTRs 2+ and symmetric. Station and gait normal.  
Hematologic:   No purpura or petechiae Assessment/Plan: 1. Fatigue, unspecified type 2. Rheumatoid arthritis involving multiple sites with positive rheumatoid factor ([facility] [address] 3. Essential hypertension 4. Class 1 obesity due to excess calories without serious comorbidity with body mass index (BMI) of 30.0 to 30.9 in adult Impressions/Plan: 
Impression 1. Generalized fatigue of unclear etiology I am going to check his CBC CMP and thyroid studies just to make sure that some of his other medications have not affected his thyroid level liver enzymes and blood counts. 2.  Rheumatoid arthritis and I am thinking that is probably his rheumatologic medications causing his fatigue I do not think it is his increased dose of lisinopril or the change of his Claritin from over-the-counter to prescription 3. Hypertension that is not controlled as he has not been taking his regular dose of lisinopril for the past 3 days and have asked him to get back on that.    
4.  Obesity I have encouraged him to work on diet exercise weight reduction I will call the lab results and I have asked him to schedule his follow-up for about a month at which time he is due for his annual 
I will see him sooner should to be a problem. Orders Placed This Encounter  CBC WITH AUTOMATED DIFF ([facility])  METABOLIC PANEL, COMPREHENSIVE ([facility])  T4, FREE ([facility])  TSH 3RD GENERATION ([facility])  amoxicillin (AMOXIL) 875 mg tablet Follow-up and Dispositions · Return in about 1 month (around 5/9/2019). No results found for any visits on 04/09/19. Princess Minesh MD 
 
The patient was given after the visit summary the patient verbalized an understanding of the plans and problems as explained.

## 2019-04-09 NOTE — PATIENT INSTRUCTIONS
DASH Diet: Care Instructions Your Care Instructions The DASH diet is an eating plan that can help lower your blood pressure. DASH stands for Dietary Approaches to Stop Hypertension. Hypertension is high blood pressure. The DASH diet focuses on eating foods that are high in calcium, potassium, and magnesium. These nutrients can lower blood pressure. The foods that are highest in these nutrients are fruits, vegetables, low-fat dairy products, nuts, seeds, and legumes. But taking calcium, potassium, and magnesium supplements instead of eating foods that are high in those nutrients does not have the same effect. The DASH diet also includes whole grains, fish, and poultry. The DASH diet is one of several lifestyle changes your doctor may recommend to lower your high blood pressure. Your doctor may also want you to decrease the amount of sodium in your diet. Lowering sodium while following the DASH diet can lower blood pressure even further than just the DASH diet alone. Follow-up care is a key part of your treatment and safety. Be sure to make and go to all appointments, and call your doctor if you are having problems. It's also a good idea to know your test results and keep a list of the medicines you take. How can you care for yourself at home? Following the DASH diet · Eat 4 to 5 servings of fruit each day. A serving is 1 medium-sized piece of fruit, ½ cup chopped or canned fruit, 1/4 cup dried fruit, or 4 ounces (½ cup) of fruit juice. Choose fruit more often than fruit juice. · Eat 4 to 5 servings of vegetables each day. A serving is 1 cup of lettuce or raw leafy vegetables, ½ cup of chopped or cooked vegetables, or 4 ounces (½ cup) of vegetable juice. Choose vegetables more often than vegetable juice. · Get 2 to 3 servings of low-fat and fat-free dairy each day. A serving is 8 ounces of milk, 1 cup of yogurt, or 1 ½ ounces of cheese. · Eat 6 to 8 servings of grains each day. A serving is 1 slice of bread, 1 ounce of dry cereal, or ½ cup of cooked rice, pasta, or cooked cereal. Try to choose whole-grain products as much as possible. · Limit lean meat, poultry, and fish to 2 servings each day. A serving is 3 ounces, about the size of a deck of cards. · Eat 4 to 5 servings of nuts, seeds, and legumes (cooked dried beans, lentils, and split peas) each week. A serving is 1/3 cup of nuts, 2 tablespoons of seeds, or ½ cup of cooked beans or peas. · Limit fats and oils to 2 to 3 servings each day. A serving is 1 teaspoon of vegetable oil or 2 tablespoons of salad dressing. · Limit sweets and added sugars to 5 servings or less a week. A serving is 1 tablespoon jelly or jam, ½ cup sorbet, or 1 cup of lemonade. · Eat less than 2,300 milligrams (mg) of sodium a day. If you limit your sodium to 1,500 mg a day, you can lower your blood pressure even more. Tips for success · Start small. Do not try to make dramatic changes to your diet all at once. You might feel that you are missing out on your favorite foods and then be more likely to not follow the plan. Make small changes, and stick with them. Once those changes become habit, add a few more changes. · Try some of the following: ? Make it a goal to eat a fruit or vegetable at every meal and at snacks. This will make it easy to get the recommended amount of fruits and vegetables each day. ? Try yogurt topped with fruit and nuts for a snack or healthy dessert. ? Add lettuce, tomato, cucumber, and onion to sandwiches. ? Combine a ready-made pizza crust with low-fat mozzarella cheese and lots of vegetable toppings. Try using tomatoes, squash, spinach, broccoli, carrots, cauliflower, and onions. ? Have a variety of cut-up vegetables with a low-fat dip as an appetizer instead of chips and dip. ? Sprinkle sunflower seeds or chopped almonds over salads.  Or try adding chopped walnuts or almonds to cooked vegetables. ? Try some vegetarian meals using beans and peas. Add garbanzo or kidney beans to salads. Make burritos and tacos with mashed schmidt beans or black beans. Where can you learn more? Go to http://garry-aurora.info/. Enter Y457 in the search box to learn more about \"DASH Diet: Care Instructions. \" Current as of: July 22, 2018 Content Version: 11.9 © 6033-5086 [employer]. Care instructions adapted under license by [employer] (which disclaims liability or warranty for this information). If you have questions about a medical condition or this instruction, always ask your healthcare professional. [address] any warranty or liability for your use of this information.

## 2019-04-11 LAB
T4 FREE SERPL-MCNC: 1.02 NG/DL (ref 0.58–2.3)
TSH SERPL DL<=0.05 MIU/L-ACNC: 1.35 UIU/ML (ref 0.34–5.6)

## 2019-04-12 NOTE — PROGRESS NOTES
Thyroid tests as well as electrolytes and blood count are all normal so hopefully his rheumatologist was able to figure out something to help him as far as his general fatigue.

## 2019-07-22 RX ORDER — LEVOTHYROXINE SODIUM 50 UG/1
TABLET ORAL
Qty: 90 TAB | Refills: 3 | Status: SHIPPED | OUTPATIENT
Start: 2019-07-22 | End: 2020-04-14 | Stop reason: SDUPTHER

## 2019-07-22 NOTE — TELEPHONE ENCOUNTER
RX refill request from the patient/pharmacy. Patient last seen 04- with labs, and needs to schedule a f/up appointment.     Requested Prescriptions     Pending Prescriptions Disp Refills    levothyroxine (SYNTHROID) 50 mcg tablet 90 Tab 3     Sig: TAKE 1 TABLET BY MOUTH EVERY DAY

## 2019-09-24 PROBLEM — Z00.00 ANNUAL PHYSICAL EXAM: Status: RESOLVED | Noted: 2018-05-03 | Resolved: 2019-09-24

## 2020-01-07 DIAGNOSIS — I10 ESSENTIAL HYPERTENSION: ICD-10-CM

## 2020-01-07 RX ORDER — LISINOPRIL 20 MG/1
TABLET ORAL
Qty: 30 TAB | Refills: 0 | Status: SHIPPED | OUTPATIENT
Start: 2020-01-07 | End: 2020-02-10

## 2020-01-07 RX ORDER — LORATADINE 10 MG/1
TABLET ORAL
Qty: 30 TAB | Refills: 0 | Status: SHIPPED | OUTPATIENT
Start: 2020-01-07 | End: 2020-04-14 | Stop reason: SDUPTHER

## 2020-01-07 NOTE — TELEPHONE ENCOUNTER
RX refill request from the patient/pharmacy. Patient last seen 04- with labs, and does not to have a f/up appointment.   Requested Prescriptions     Pending Prescriptions Disp Refills    lisinopril (PRINIVIL, ZESTRIL) 20 mg tablet [Pharmacy Med Name: LISINOPRIL 20MG TABLETS] 30 Tab 0     Sig: TAKE 1 TABLET BY MOUTH DAILY    loratadine (CLARITIN) 10 mg tablet [Pharmacy Med Name: LORATADINE 10MG TABLETS] 30 Tab 0     Sig: TAKE 1 TABLET BY MOUTH DAILY

## 2020-02-10 DIAGNOSIS — I10 ESSENTIAL HYPERTENSION: ICD-10-CM

## 2020-02-10 RX ORDER — LISINOPRIL 20 MG/1
TABLET ORAL
Qty: 30 TAB | Refills: 0 | Status: SHIPPED | OUTPATIENT
Start: 2020-02-10 | End: 2020-03-10

## 2020-02-10 NOTE — TELEPHONE ENCOUNTER
RX refill request from the patient/pharmacy. Patient last seen 04- with labs, and needs appointment before any additional refills.   Requested Prescriptions     Pending Prescriptions Disp Refills    lisinopril (PRINIVIL, ZESTRIL) 20 mg tablet [Pharmacy Med Name: LISINOPRIL 20MG TABLETS] 30 Tab 0     Sig: TAKE 1 TABLET BY MOUTH DAILY

## 2020-03-09 DIAGNOSIS — I10 ESSENTIAL HYPERTENSION: ICD-10-CM

## 2020-03-10 RX ORDER — LISINOPRIL 20 MG/1
TABLET ORAL
Qty: 30 TAB | Refills: 0 | Status: SHIPPED | OUTPATIENT
Start: 2020-03-10 | End: 2020-04-14 | Stop reason: SDUPTHER

## 2020-03-10 NOTE — TELEPHONE ENCOUNTER
RX refill request from the patient/pharmacy.  Patient last seen 04- with labs, and needs appointment before any additional refills,  Requested Prescriptions     Pending Prescriptions Disp Refills    lisinopriL (PRINIVIL, ZESTRIL) 20 mg tablet [Pharmacy Med Name: LISINOPRIL 20MG TABLETS] 30 Tab 0     Sig: TAKE 1 TABLET BY MOUTH DAILY

## 2020-04-14 ENCOUNTER — OFFICE VISIT (OUTPATIENT)
Dept: INTERNAL MEDICINE CLINIC | Age: 55
End: 2020-04-14

## 2020-04-14 VITALS
BODY MASS INDEX: 33 KG/M2 | HEART RATE: 85 BPM | WEIGHT: 230.5 LBS | OXYGEN SATURATION: 94 % | HEIGHT: 70 IN | TEMPERATURE: 97.8 F

## 2020-04-14 DIAGNOSIS — M05.79 RHEUMATOID ARTHRITIS INVOLVING MULTIPLE SITES WITH POSITIVE RHEUMATOID FACTOR (HCC): ICD-10-CM

## 2020-04-14 DIAGNOSIS — E03.9 ACQUIRED HYPOTHYROIDISM: ICD-10-CM

## 2020-04-14 DIAGNOSIS — I10 ESSENTIAL HYPERTENSION: ICD-10-CM

## 2020-04-14 DIAGNOSIS — L23.9 ALLERGIC DERMATITIS: ICD-10-CM

## 2020-04-14 DIAGNOSIS — E78.5 DYSLIPIDEMIA: ICD-10-CM

## 2020-04-14 DIAGNOSIS — Z00.00 ANNUAL PHYSICAL EXAM: Primary | ICD-10-CM

## 2020-04-14 DIAGNOSIS — E66.09 CLASS 1 OBESITY DUE TO EXCESS CALORIES WITHOUT SERIOUS COMORBIDITY WITH BODY MASS INDEX (BMI) OF 30.0 TO 30.9 IN ADULT: ICD-10-CM

## 2020-04-14 LAB
A-G RATIO,AGRAT: 1.8 RATIO
ALBUMIN SERPL-MCNC: 5.1 G/DL (ref 3.9–5.4)
ALP SERPL-CCNC: 78 U/L (ref 38–126)
ALT SERPL-CCNC: 31 U/L (ref 0–50)
ANION GAP SERPL CALC-SCNC: 15 MMOL/L
AST SERPL W P-5'-P-CCNC: 28 U/L (ref 14–36)
BILIRUB SERPL-MCNC: 0.6 MG/DL (ref 0.2–1.3)
BILIRUB UR QL: NEGATIVE
BUN SERPL-MCNC: 22 MG/DL (ref 9–20)
BUN/CREATININE RATIO,BUCR: 18 RATIO
CALCIUM SERPL-MCNC: 9.7 MG/DL (ref 8.4–10.2)
CHLORIDE SERPL-SCNC: 101 MMOL/L (ref 98–107)
CHOL/HDL RATIO,CHHD: 5 RATIO (ref 0–4)
CHOLEST SERPL-MCNC: 213 MG/DL (ref 0–200)
CLARITY: CLEAR
CO2 SERPL-SCNC: 28 MMOL/L (ref 22–32)
COLOR UR: NORMAL
CREAT SERPL-MCNC: 1.2 MG/DL (ref 0.8–1.5)
GLOBULIN,GLOB: 2.9
GLUCOSE 24H UR-MRATE: NEGATIVE G/(24.H)
GLUCOSE SERPL-MCNC: 81 MG/DL (ref 75–110)
HDLC SERPL-MCNC: 43 MG/DL (ref 35–130)
HGB UR QL STRIP: NEGATIVE
KETONES UR QL STRIP.AUTO: NEGATIVE
LDL/HDL RATIO,LDHD: 3 RATIO
LDLC SERPL CALC-MCNC: 113 MG/DL (ref 0–130)
LEUKOCYTE ESTERASE: NEGATIVE
NITRITE UR QL STRIP.AUTO: NEGATIVE
PH UR STRIP: 5 [PH] (ref 5–7)
POTASSIUM SERPL-SCNC: 4.7 MMOL/L (ref 3.6–5)
PROT SERPL-MCNC: 8 G/DL (ref 6.3–8.2)
PROT UR STRIP-MCNC: NEGATIVE MG/DL
SODIUM SERPL-SCNC: 144 MMOL/L (ref 137–145)
SP GR UR REFRACTOMETRY: 1.01 (ref 1–1.03)
TRIGL SERPL-MCNC: 284 MG/DL (ref 0–200)
UROBILINOGEN UR QL STRIP.AUTO: NEGATIVE
VLDLC SERPL CALC-MCNC: 57 MG/DL

## 2020-04-14 RX ORDER — LORATADINE 10 MG/1
10 TABLET ORAL DAILY
Qty: 90 TAB | Refills: 2 | Status: SHIPPED | OUTPATIENT
Start: 2020-04-14 | End: 2022-04-18 | Stop reason: SDUPTHER

## 2020-04-14 RX ORDER — LEVOTHYROXINE SODIUM 50 UG/1
TABLET ORAL
Qty: 90 TAB | Refills: 2 | Status: SHIPPED | OUTPATIENT
Start: 2020-04-14 | End: 2020-12-24

## 2020-04-14 RX ORDER — FLUTICASONE PROPIONATE 50 MCG
SPRAY, SUSPENSION (ML) NASAL
Qty: 3 BOTTLE | Refills: 2 | Status: SHIPPED | OUTPATIENT
Start: 2020-04-14 | End: 2021-07-01

## 2020-04-14 RX ORDER — LISINOPRIL 20 MG/1
20 TABLET ORAL DAILY
Qty: 90 TAB | Refills: 2 | Status: SHIPPED | OUTPATIENT
Start: 2020-04-14 | End: 2020-12-24

## 2020-04-14 RX ORDER — ETANERCEPT 50 MG/ML
50 SOLUTION SUBCUTANEOUS
COMMUNITY
Start: 2020-01-21

## 2020-04-14 NOTE — PROGRESS NOTES
Chief Complaint   Patient presents with    Medication Check     follow check    Hypertension     1. Have you been to the ER, urgent care clinic since your last visit? Hospitalized since your last visit? No    2. Have you seen or consulted any other health care providers outside of the [address] [place] [address] since your last visit? Include any pap smears or colon screening.  No

## 2020-04-14 NOTE — PROGRESS NOTES
HPI:   53 y.o.  presents for comprehensive annual healthcare examination and follow up appointment. No hospital, ER or specialist visits since last primary care visit except as noted below. He is followed regularly although he is overdue for his appointment but should be seen on a regular basis for hypertension, hyperlipidemia, hypothyroidism, allergic rhinitis as well as obesity. He also has rheumatoid arthritis which he is on Enbrel and followed by his rheumatologist.  He currently denies any chest pain, shortness of breath, palpitations, PND, orthopnea or other cardiorespiratory complaints. He notes no GI or  complaints. He notes no headaches, dizziness or neurologic complaints. He has no current active arthritic complaints and there are no other complaints on complete review of systems.     Patient Active Problem List    Diagnosis    Dyslipidemia    Class 1 obesity due to excess calories without serious comorbidity with body mass index (BMI) of 30.0 to 30.9 in adult    Essential hypertension    Rheumatoid arthritis involving multiple sites with positive rheumatoid factor (HCC)    Acquired hypothyroidism    Fatigue    Non-seasonal allergic rhinitis    Annual physical exam    Allergic dermatitis       Past Medical History:   Diagnosis Date    DDD (degenerative disc disease), lumbar     Elevated BP     1/20/17 on no meds at this time    Hypothyroid     MARYAN on CPAP     RA (rheumatoid arthritis) ([facility] [address]        Social History     Tobacco Use    Smoking status: Never Smoker    Smokeless tobacco: Never Used   Substance Use Topics    Alcohol use: No    Drug use: No       Family History   Problem Relation Age of Onset    Hypertension Mother        Outpatient Medications Marked as Taking for the 4/14/20 encounter (Office Visit) with Giorgi Kwan MD   Medication Sig Dispense Refill    EnbreL SureClick 50 mg/mL (1 mL) injection       lisinopriL (PRINIVIL, ZESTRIL) 20 mg tablet Take 1 Tab by mouth daily. 90 Tab 2    levothyroxine (SYNTHROID) 50 mcg tablet TAKE 1 TABLET BY MOUTH EVERY DAY 90 Tab 2    loratadine (CLARITIN) 10 mg tablet Take 1 Tab by mouth daily. 90 Tab 2    fluticasone propionate (FLONASE) 50 mcg/actuation nasal spray USE 2 SPRAYS IN EACH NOSTRIL AT BEDTIME 3 Bottle 2    cyclobenzaprine (FLEXERIL) 10 mg tablet Take 1 Tab by mouth three (3) times daily as needed for Muscle Spasm(s). 30 Tab 1    aspirin delayed-release 81 mg tablet Take  by mouth daily. Allergies   Allergen Reactions    Sulfa (Sulfonamide Antibiotics) Nausea and Vomiting       ROS:     Constitutional: He denies fevers, weight loss, sweats. Eyes: No blurred or double vision. ENT: No difficulty with swallowing, taste, speech or smell. Neck: no stiffness or swelling  Respiratory: No cough wheezing or shortness of breath. Cardiovascular: Denies chest pain, palpitations, unexplained indigestion or syncope. Gastrointestinal:  No changes in bowel movements, no abdominal pain, no bloating. Genitourinary:  He denies frequency, nocturia or stranguria. Extremities: No joint pain, stiffness or swelling. Neurological:  No numbness, tingling, burring paresthesias or loss of motor strength. No syncope, dizziness or frequent headache  Lymphatic: no adenopathy noted  Hematologic: no easy bruising or bleeding gums  Skin:  No recent rashes or mole changes. Psychiatric/Behavioral:  Negative for depression. The patient is not nervous/anxious. PE:     Vitals:    04/14/20 1329   Pulse: 85   Temp: 97.8 °F (36.6 °C)   TempSrc: Oral   SpO2: 94%   Weight: 230 lb 8 oz (104.6 kg)   Height: 5' 10\" (1.778 m)   PainSc:   0 - No pain        General appearance - alert, well appearing, and in no distress  Mental status - alert, oriented to person, place, and time  HEENT:  Ears - bilateral TM's and external ear canals clear  Eyes - pupillary responses were normal.  Extraocular muscle function intact.   Lids and conjunctiva not injected. Fundoscopic exam revealed sharp disc margins. eye movements intact  Pharynx- clear with teeth in good repair. No masses were noted  Neck - supple without thyromegaly or burit. No JVD noted  Lungs - clear to auscultation and percussion  Cardiac- normal rate, regular rhythm without murmurs. PMI not displaced. No gallop, rub or click  Abdomen - flat, soft, non-tender without palpable organomegaly or mass. No pulsatile mass was felt, and not bruit was heard. Bowel sounds were active  : Circumcised, Testes descended w/o masses  Rectal: normal sphincter tone, prostate normal, no masses, stool brown and hemacult negative  Extremities -  no clubbing cyanosis or edema  Lymphatics - no palpable lymphadenopathy, no hepatosplenomegaly  Hematologic: no petechiae or purpura  Peripheral vascular -Femoral, Dorsalis pedis and posterior tibial pulses felt without difficulty  Skin - no rash or unusual mole change noted  Neurological - Cranial nerves II-XII grossly intact. Motor strength 5/5. DTR's 2+ and symmetric. Station and gait normal  Back exam - full range of motion, no tenderness, palpable spasm or pain on motion  Musculoskeletal - no joint tenderness, deformity or swelling      Assessment/Plan:     1. Annual physical exam    2. Essential hypertension    3. Dyslipidemia    4. Rheumatoid arthritis involving multiple sites with positive rheumatoid factor (HCC)    5. Class 1 obesity due to excess calories without serious comorbidity with body mass index (BMI) of 30.0 to 30.9 in adult    6. Acquired hypothyroidism    7. Allergic dermatitis        Impression  1. Annual physical examination normal except for mild obesity which we discussed diet and weight reduction  2. Hypertension that is controlled so continue lisinopril which I renewed  3. Dyslipidemia repeat status pending currently on diet  4. Rheumatoid arthritis followed by rheumatology  5.   Obesity we did discuss diet, exercise and weight reduction for overall health benefit and I do note his weight is up a little from last visit. 6.  Hypothyroidism repeat status pending  7. Allergic rhinitis stable  I will call with lab results and make further recommendations or adjustments if necessary. Follow-up scheduled for 6 months and expressed importance of timely follow-up  I will see him sooner if there are problems. Health Maintenance reviewed - updated. Orders Placed This Encounter    CBC WITH AUTOMATED DIFF    METABOLIC PANEL, COMPREHENSIVE ([facility])    LIPID PANEL ([facility])    T4, FREE ([facility])    TSH 3RD GENERATION ([facility])    PSA SCREENING (SCREENING) ([facility])    [facility]    EnbreL SureClick 50 mg/mL (1 mL) injection    lisinopriL (PRINIVIL, ZESTRIL) 20 mg tablet    levothyroxine (SYNTHROID) 50 mcg tablet    loratadine (CLARITIN) 10 mg tablet    fluticasone propionate (FLONASE) 50 mcg/actuation nasal spray       Medications Discontinued During This Encounter   Medication Reason    amoxicillin (AMOXIL) 875 mg tablet Alternate Therapy    lisinopriL (PRINIVIL, ZESTRIL) 20 mg tablet Reorder    levothyroxine (SYNTHROID) 50 mcg tablet Reorder    loratadine (CLARITIN) 10 mg tablet Reorder    fluticasone (FLONASE) 50 mcg/actuation nasal spray Reorder       Current Outpatient Medications   Medication Sig Dispense Refill    EnbreL SureClick 50 mg/mL (1 mL) injection       lisinopriL (PRINIVIL, ZESTRIL) 20 mg tablet Take 1 Tab by mouth daily. 90 Tab 2    levothyroxine (SYNTHROID) 50 mcg tablet TAKE 1 TABLET BY MOUTH EVERY DAY 90 Tab 2    loratadine (CLARITIN) 10 mg tablet Take 1 Tab by mouth daily. 90 Tab 2    fluticasone propionate (FLONASE) 50 mcg/actuation nasal spray USE 2 SPRAYS IN EACH NOSTRIL AT BEDTIME 3 Bottle 2    cyclobenzaprine (FLEXERIL) 10 mg tablet Take 1 Tab by mouth three (3) times daily as needed for Muscle Spasm(s).  30 Tab 1    aspirin delayed-release 81 mg tablet Take  by mouth daily.  ixekizumab (TALTZ SYRINGE) 80 mg/mL syringe by SubCUTAneous route See Admin Instructions. Inject 160 mg (two 80 mg) syringes at week 0 followed by 80 mg week 2, 4, 8, 10, 12 then 80 mg every 4 weeks.  leflunomide (ARAVA) 20 mg tablet TAKE 1 TABLET ONCE A DAY  1       No results found for any visits on 04/14/20. Recommended healthy diet low in carbohydrates, fats, sodium and cholesterol. Recommended regular cardiovascular exercise 3-6 times per week for 30-60 minutes daily. Verbal and written instructions (see AVS) provided. Patient expresses understanding of diagnosis and treatment plan.       Martha Moreno MD

## 2020-04-14 NOTE — PATIENT INSTRUCTIONS
Allergies: Care Instructions Your Care Instructions Allergies occur when your body's defense system (immune system) overreacts to certain substances. The immune system treats a harmless substance as if it were a harmful germ or virus. Many things can cause this overreaction, including pollens, medicine, food, dust, animal dander, and mold. Allergies can be mild or severe. Mild allergies can be managed with home treatment. But medicine may be needed to prevent problems. Managing your allergies is an important part of staying healthy. Your doctor may suggest that you have allergy testing to help find out what is causing your allergies. When you know what things trigger your symptoms, you can avoid them. This can prevent allergy symptoms and other health problems. For severe allergies that cause reactions that affect your whole body (anaphylactic reactions), your doctor may prescribe a shot of epinephrine to carry with you in case you have a severe reaction. Learn how to give yourself the shot and keep it with you at all times. Make sure it is not . Follow-up care is a key part of your treatment and safety. Be sure to make and go to all appointments, and call your doctor if you are having problems. It's also a good idea to know your test results and keep a list of the medicines you take. How can you care for yourself at home? · If you have been told by your doctor that dust or dust mites are causing your allergy, decrease the dust around your bed: 
? Wash sheets, pillowcases, and other bedding in hot water every week. ? Use dust-proof covers for pillows, duvets, and mattresses. Avoid plastic covers because they tear easily and do not \"breathe. \" Wash as instructed on the label. ? Do not use any blankets and pillows that you do not need. ? Use blankets that you can wash in your washing machine. ? Consider removing drapes and carpets, which attract and hold dust, from your bedroom. · If you are allergic to house dust and mites, do not use home humidifiers. Your doctor can suggest ways you can control dust and mites. · Look for signs of cockroaches. Cockroaches cause allergic reactions. Use cockroach baits to get rid of them. Then, clean your home well. Cockroaches like areas where grocery bags, newspapers, empty bottles, or cardboard boxes are stored. Do not keep these inside your home, and keep trash and food containers sealed. Seal off any spots where cockroaches might enter your home. · If you are allergic to mold, get rid of furniture, rugs, and drapes that smell musty. Check for mold in the bathroom. · If you are allergic to outdoor pollen or mold spores, use air-conditioning. Change or clean all filters every month. Keep windows closed. · If you are allergic to pollen, stay inside when pollen counts are high. Use a vacuum  with a HEPA filter or a double-thickness filter at least two times each week. · Stay inside when air pollution is bad. Avoid paint fumes, perfumes, and other strong odors. · Avoid conditions that make your allergies worse. Stay away from smoke. Do not smoke or let anyone else smoke in your house. Do not use fireplaces or wood-burning stoves. · If you are allergic to your pets, change the air filter in your furnace every month. Use high-efficiency filters. · If you are allergic to pet dander, keep pets outside or out of your bedroom. Old carpet and cloth furniture can hold a lot of animal dander. You may need to replace them. When should you call for help? Give an epinephrine shot if: 
  · You think you are having a severe allergic reaction.  
  · You have symptoms in more than one body area, such as mild nausea and an itchy mouth.  
 After giving an epinephrine shot call  911, even if you feel better. 
 Call 911 if: 
  · You have symptoms of a severe allergic reaction. These may include: 
? Sudden raised, red areas (hives) all over your body. ? Swelling of the throat, mouth, lips, or tongue. ? Trouble breathing. ? Passing out (losing consciousness). Or you may feel very lightheaded or suddenly feel weak, confused, or restless.  
  · You have been given an epinephrine shot, even if you feel better.  
 Call your doctor now or seek immediate medical care if: 
  · You have symptoms of an allergic reaction, such as: ? A rash or hives (raised, red areas on the skin). ? Itching. ? Swelling. ? Belly pain, nausea, or vomiting.  
 Watch closely for changes in your health, and be sure to contact your doctor if: 
  · You do not get better as expected. Where can you learn more? Go to http://garry-aurora.info/ Enter U709 in the search box to learn more about \"Allergies: Care Instructions. \" Current as of: October 6, 2019Content Version: 12.4 © 1399-4010 [employer]. Care instructions adapted under license by [employer] (which disclaims liability or warranty for this information). If you have questions about a medical condition or this instruction, always ask your healthcare professional. [place] [address] any warranty or liability for your use of this information.

## 2020-04-15 LAB
BASOPHILS # BLD AUTO: 0 X10E3/UL (ref 0–0.2)
BASOPHILS NFR BLD AUTO: 0 %
EOSINOPHIL # BLD AUTO: 0.2 X10E3/UL (ref 0–0.4)
EOSINOPHIL NFR BLD AUTO: 2 %
ERYTHROCYTE [DISTWIDTH] IN BLOOD BY AUTOMATED COUNT: 11.6 % (ref 11.6–15.4)
HCT VFR BLD AUTO: 46.2 % (ref 37.5–51)
HGB BLD-MCNC: 15.8 G/DL (ref 13–17.7)
IMM GRANULOCYTES # BLD AUTO: 0 X10E3/UL (ref 0–0.1)
IMM GRANULOCYTES NFR BLD AUTO: 0 %
LYMPHOCYTES # BLD AUTO: 1.9 X10E3/UL (ref 0.7–3.1)
LYMPHOCYTES NFR BLD AUTO: 26 %
MCH RBC QN AUTO: 32.5 PG (ref 26.6–33)
MCHC RBC AUTO-ENTMCNC: 34.2 G/DL (ref 31.5–35.7)
MCV RBC AUTO: 95 FL (ref 79–97)
MONOCYTES # BLD AUTO: 0.6 X10E3/UL (ref 0.1–0.9)
MONOCYTES NFR BLD AUTO: 9 %
NEUTROPHILS # BLD AUTO: 4.7 X10E3/UL (ref 1.4–7)
NEUTROPHILS NFR BLD AUTO: 63 %
PLATELET # BLD AUTO: 335 X10E3/UL (ref 150–450)
PSA, TEST22: 0.8 NG/ML (ref 0–4)
RBC # BLD AUTO: 4.86 X10E6/UL (ref 4.14–5.8)
T4 FREE SERPL-MCNC: 0.91 NG/DL (ref 0.58–2.3)
TSH SERPL DL<=0.05 MIU/L-ACNC: 0.99 UIU/ML (ref 0.34–5.6)
WBC # BLD AUTO: 7.5 X10E3/UL (ref 3.4–10.8)

## 2020-04-15 NOTE — PROGRESS NOTES
Lab okay except for increased triglycerides and increased cholesterol with slight elevated LDL so watch diet more closely and work on exercise and weight reduction. Letter generated to patient regarding.

## 2020-04-15 NOTE — PROGRESS NOTES
Lab okay except for increased triglycerides and increased cholesterol with slight elevated LDL so watch diet more closely and work on exercise and weight reduction.

## 2020-05-14 PROBLEM — Z00.00 ANNUAL PHYSICAL EXAM: Status: RESOLVED | Noted: 2018-05-03 | Resolved: 2020-05-14

## 2020-07-31 ENCOUNTER — HOSPITAL ENCOUNTER (OUTPATIENT)
Dept: CT IMAGING | Age: 55
Discharge: HOME | End: 2020-07-31
Attending: INTERNAL MEDICINE
Payer: COMMERCIAL

## 2020-07-31 ENCOUNTER — APPOINTMENT (OUTPATIENT)
Dept: CT IMAGING | Age: 55
End: 2020-07-31
Attending: INTERNAL MEDICINE
Payer: COMMERCIAL

## 2020-07-31 ENCOUNTER — OFFICE VISIT (OUTPATIENT)
Dept: INTERNAL MEDICINE CLINIC | Age: 55
End: 2020-07-31

## 2020-07-31 VITALS
BODY MASS INDEX: 33.47 KG/M2 | OXYGEN SATURATION: 96 % | HEART RATE: 97 BPM | TEMPERATURE: 98.4 F | RESPIRATION RATE: 18 BRPM | SYSTOLIC BLOOD PRESSURE: 138 MMHG | WEIGHT: 233.8 LBS | HEIGHT: 70 IN | DIASTOLIC BLOOD PRESSURE: 82 MMHG

## 2020-07-31 DIAGNOSIS — R10.31 RIGHT LOWER QUADRANT ABDOMINAL PAIN: Primary | ICD-10-CM

## 2020-07-31 DIAGNOSIS — R10.31 RIGHT LOWER QUADRANT ABDOMINAL PAIN: ICD-10-CM

## 2020-07-31 DIAGNOSIS — N39.0 URINARY TRACT INFECTION WITHOUT HEMATURIA, SITE UNSPECIFIED: ICD-10-CM

## 2020-07-31 LAB
BILIRUB UR QL: NEGATIVE
CLARITY: CLEAR
COLOR UR: ABNORMAL
GLUCOSE 24H UR-MRATE: NEGATIVE G/(24.H)
HGB UR QL STRIP: NEGATIVE
KETONES UR QL STRIP.AUTO: NEGATIVE
LEUKOCYTE ESTERASE: NEGATIVE
NITRITE UR QL STRIP.AUTO: NEGATIVE
PH UR STRIP: 6 [PH] (ref 5–7)
PROT UR STRIP-MCNC: NEGATIVE MG/DL
RBC #/AREA URNS HPF: ABNORMAL #/HPF
SP GR UR REFRACTOMETRY: 1.01 (ref 1–1.03)
UROBILINOGEN UR QL STRIP.AUTO: NEGATIVE
WBC URNS QL MICRO: ABNORMAL #/HPF

## 2020-07-31 PROCEDURE — 74011636320 HC RX REV CODE- 636/320: Performed by: INTERNAL MEDICINE

## 2020-07-31 PROCEDURE — 74177 CT ABD & PELVIS W/CONTRAST: CPT

## 2020-07-31 RX ORDER — SODIUM CHLORIDE 0.9 % (FLUSH) 0.9 %
10 SYRINGE (ML) INJECTION
Status: COMPLETED | OUTPATIENT
Start: 2020-07-31 | End: 2020-07-31

## 2020-07-31 RX ORDER — DICLOFENAC SODIUM 75 MG/1
TABLET, DELAYED RELEASE ORAL
COMMUNITY
Start: 2020-07-27 | End: 2020-08-03 | Stop reason: SDUPTHER

## 2020-07-31 RX ORDER — NITROFURANTOIN 25; 75 MG/1; MG/1
CAPSULE ORAL
COMMUNITY
Start: 2020-07-27 | End: 2020-10-14 | Stop reason: ALTCHOICE

## 2020-07-31 RX ADMIN — Medication 10 ML: at 15:54

## 2020-07-31 RX ADMIN — IOPAMIDOL 100 ML: 755 INJECTION, SOLUTION INTRAVENOUS at 15:54

## 2020-07-31 RX ADMIN — IOHEXOL 50 ML: 240 INJECTION, SOLUTION INTRATHECAL; INTRAVASCULAR; INTRAVENOUS; ORAL at 15:55

## 2020-07-31 NOTE — PROGRESS NOTES
CT scan is normal so that argues for the pain being a musculoskeletal pain which the diclofenac should help.   Follow-up as scheduled or sooner should symptoms increase do not resolve

## 2020-07-31 NOTE — PROGRESS NOTES
Subjective:   Love Mcguire is a 53 y.o. male      Chief Complaint   Patient presents with    Abdominal Pain     Pt c/o lower back and right side groin, Sx has been going on since a month ago, it got worse a week ago        History of present illness: He presents today for evaluation of right-sided abdominal/right flank pain radiating to the right groin that has been intermittent for the past 3 weeks. It does seem to get worse with movement and hurts more when he lays on the right side but not when he lays on the left side. He went to a urgent care center while he was in [place] and they did a urine and told him that it was okay but did he had a urine infection placed on Macrobid. They also thought he had a kidney stone and gave him a prescription for CT scan which she was not able to find any clear to do that. They gave him some diclofenac for pain and give him some Flexeril and with a combination of diclofenac Flexeril and taking the Macrobid he actually feels better. He still notes the pain is worse when he walks. He notes no nausea vomiting. Does note decreased appetite. He notes no change in his bowel movements no change in his urination. He has noted no fevers, chills or night sweats. There are no other complaints as noted.     Patient Active Problem List   Diagnosis Code    Acquired hypothyroidism E03.9    Essential hypertension I10    Rheumatoid arthritis involving multiple sites with positive rheumatoid factor (HCC) M05.79    Class 1 obesity due to excess calories without serious comorbidity with body mass index (BMI) of 30.0 to 30.9 in adult E66.09, Z68.30    Allergic dermatitis L23.9    Dyslipidemia E78.5    Non-seasonal allergic rhinitis J30.89    Fatigue R53.83    Right lower quadrant abdominal pain R10.31      Past Medical History:   Diagnosis Date    DDD (degenerative disc disease), lumbar     Elevated BP     1/20/17 on no meds at this time    Hypothyroid     MARYAN on CPAP     RA (rheumatoid arthritis) ([facility])       Allergies   Allergen Reactions    Sulfa (Sulfonamide Antibiotics) Nausea and Vomiting      Family History   Problem Relation Age of Onset    Hypertension Mother       Social History     Socioeconomic History    Marital status:      Spouse name: Not on file    Number of children: Not on file    Years of education: Not on file    Highest education level: Not on file   Occupational History    Not on file   Social Needs    Financial resource strain: Not on file    Food insecurity     Worry: Not on file     Inability: Not on file    Transportation needs     Medical: Not on file     Non-medical: Not on file   Tobacco Use    Smoking status: Never Smoker    Smokeless tobacco: Never Used   Substance and Sexual Activity    Alcohol use: No    Drug use: No    Sexual activity: Not on file   Lifestyle    Physical activity     Days per week: Not on file     Minutes per session: Not on file    Stress: Not on file   Relationships    Social connections     Talks on phone: Not on file     Gets together: Not on file     Attends Yarsanism service: Not on file     Active member of club or organization: Not on file     Attends meetings of clubs or organizations: Not on file     Relationship status: Not on file    Intimate partner violence     Fear of current or ex partner: Not on file     Emotionally abused: Not on file     Physically abused: Not on file     Forced sexual activity: Not on file   Other Topics Concern    Not on file   Social History Narrative    Not on file     Prior to Admission medications    Medication Sig Start Date End Date Taking?  Authorizing Provider   diclofenac EC (VOLTAREN) 75 mg EC tablet TK 1 T PO BID 7/27/20  Yes Provider, Historical   nitrofurantoin, macrocrystal-monohydrate, (MACROBID) 100 mg capsule TK 1 C PO BID 7/27/20  Yes Provider, Historical   EnbreL SureClick 50 mg/mL (1 mL) injection  1/21/20  Yes Provider, Historical   lisinopriL (PRINIVIL, ZESTRIL) 20 mg tablet Take 1 Tab by mouth daily. 4/14/20  Yes Alicia Borja MD   levothyroxine (SYNTHROID) 50 mcg tablet TAKE 1 TABLET BY MOUTH EVERY DAY 4/14/20  Yes Alicia Borja MD   loratadine (CLARITIN) 10 mg tablet Take 1 Tab by mouth daily. 4/14/20  Yes Alicia Borja MD   fluticasone propionate [facility]) 50 mcg/actuation nasal spray USE 2 SPRAYS IN [facility] NOSTRIL AT BEDTIME 4/14/20  Yes Alicia Borja MD   cyclobenzaprine (FLEXERIL) 10 mg tablet Take 1 Tab by mouth three (3) times daily as needed for Muscle Spasm(s). 5/3/18  Yes Alicia Borja MD   aspirin delayed-release 81 mg tablet Take  by mouth daily. Yes Provider, Historical        Review of Systems              Constitutional:  He denies fever, weight loss, sweats or fatigue. EYES: No blurred or double vision,               ENT: no nasal congestion, no headache or dizziness. No difficulty with               swallowing, taste, speech or smell. Respiratory:  No cough, wheezing or shortness of breath. No sputum production. Cardiac:  Denies chest pain, palpitations, unexplained indigestion, syncope, edema, PND or orthopnea. GI:  No changes in bowel movements, no abdominal pain, no bloating, anorexia, nausea, vomiting or heartburn. :  No frequency or dysuria. Denies incontinence or sexual dysfunction. Right abdominal and flank pain with radiation to the right lower abdomen and pelvic area  Extremities:  No joint pain, stiffness or swelling  Back:.no pain or soreness  Skin:  No recent rashes or mole changes. Neurological:  No numbness, tingling, burning paresthesias or loss of motor strength. No syncope, dizziness, frequent headaches or memory loss.   Hematologic:  No easy bruising  Lymphatic: No lymph node enlargement    Objective:     Vitals:    07/31/20 0843   BP: 138/82   Pulse: 97   Resp: 18   Temp: 98.4 °F (36.9 °C)   TempSrc: Oral   SpO2: 96%   Weight: 233 lb 12.8 oz (106.1 kg)   Height: 5' 10\" (1.778 m)   PainSc:   5   PainLoc: Abdomen       Body mass index is 33.55 kg/m². Physical Examination:              General Appearance:  Well-developed, well-nourished, no acute distress. HEENT:      Ears:  The TMs and ear canals were clear. Eyes:  The pupillary responses were normal.  Extraocular muscle function intact. Lids and conjunctiva not injected. Funduscopic exam revealed sharp disc margins. Nares: Clear w/o edema or erythema  Pharynx:  Clear with teeth in good repair. No masses were noted. Neck:  Supple without thyromegaly or adenopathy. No JVD noted. No carotid                bruits. Lungs:  Clear to auscultation and percussion. Cardiac:  Regular rate and rhythm without murmur. PMI not displaced. No gallop, rub or click. Abdominal: Soft, non-tender except mild tenderness to palpation deep right lower quadrant, no hepata-spleenomegally or masses. No evidence of hernias  Extremities:  No clubbing, cyanosis or edema. Skin:  No rash or unusual mole changes noted. Lymph Nodes:  None felt in the cervical, supraclavicular, axillary or inguinal region. Neurological: . DTRs 2+ and symmetric. Station and gait normal.   Hematologic:   No purpura or petechiae        Assessment/Plan:         1. Right lower quadrant abdominal pain        Impressions/Plan:  Impression  1. Right lower quadrant abdominal pain this is somewhat atypical for renal calculi although the flank pain is suggestive of that. The fact that increases with movement is suggestive of more musculoskeletal and it is better with diclofenac and Flexaril but not cleared. I did a stat urine that is relatively unremarkable with 5 white cells and 2-3 red cells. I did a flat and upright abdominal film that shows a constipated bowel gas pattern but no evidence of acute process. And when to schedule CT abdomen pelvis to further evaluate this.   No specific treatment changes since he is better when he is taking until I get results of the CT scan. Follow-up to be determined otherwise as previously scheduled. Orders Placed This Encounter    XR ABD FLAT/ ERECT    CT ABD PELV W CONT    CT ABD PELV WO CONT    URINALYSIS W/MICROSCOPIC ([facility])    diclofenac EC (VOLTAREN) 75 mg EC tablet    nitrofurantoin, macrocrystal-monohydrate, (MACROBID) 100 mg capsule       Follow-up and Dispositions    · Return TBD. No results found for any visits on 07/31/20. Hever Cerda MD    The patient was given after the visit summary the patient verbalized an understanding of the plans and problems as explained.

## 2020-07-31 NOTE — PATIENT INSTRUCTIONS
Abdominal Pain: Care Instructions Your Care Instructions Abdominal pain has many possible causes. Some aren't serious and get better on their own in a few days. Others need more testing and treatment. If your pain continues or gets worse, you need to be rechecked and may need more tests to find out what is wrong. You may need surgery to correct the problem. Don't ignore new symptoms, such as fever, nausea and vomiting, urination problems, pain that gets worse, and dizziness. These may be signs of a more serious problem. Your doctor may have recommended a follow-up visit in the next 8 to 12 hours. If you are not getting better, you may need more tests or treatment. The doctor has checked you carefully, but problems can develop later. If you notice any problems or new symptoms, get medical treatment right away. Follow-up care is a key part of your treatment and safety. Be sure to make and go to all appointments, and call your doctor if you are having problems. It's also a good idea to know your test results and keep a list of the medicines you take. How can you care for yourself at home? · Rest until you feel better. · To prevent dehydration, drink plenty of fluids, enough so that your urine is light yellow or clear like water. Choose water and other caffeine-free clear liquids until you feel better. If you have kidney, heart, or liver disease and have to limit fluids, talk with your doctor before you increase the amount of fluids you drink. · If your stomach is upset, eat mild foods, such as rice, dry toast or crackers, bananas, and applesauce. Try eating several small meals instead of two or three large ones. · Wait until 48 hours after all symptoms have gone away before you have spicy foods, alcohol, and drinks that contain caffeine. · Do not eat foods that are high in fat.  
· Avoid anti-inflammatory medicines such as aspirin, ibuprofen (Advil, Motrin), and naproxen (Aleve). These can cause stomach upset. Talk to your doctor if you take daily aspirin for another health problem. When should you call for help? [id number] anytime you think you may need emergency care. For example, call if: 
· You passed out (lost consciousness). · You pass maroon or very bloody stools. · You vomit blood or what looks like coffee grounds. · You have new, severe belly pain. Call your doctor now or seek immediate medical care if: 
· Your pain gets worse, especially if it becomes focused in one area of your belly. · You have a new or higher fever. · Your stools are black and look like tar, or they have streaks of blood. · You have unexpected vaginal bleeding. · You have symptoms of a urinary tract infection. These may include: 
? Pain when you urinate. ? Urinating more often than usual. 
? Blood in your urine. · You are dizzy or lightheaded, or you feel like you may faint. Watch closely for changes in your health, and be sure to contact your doctor if: 
· You are not getting better after 1 day (24 hours). Where can you learn more? Go to http://garry-aurora.info/ Enter M956 in the search box to learn more about \"Abdominal Pain: Care Instructions. \" Current as of: June 26, 2019               Content Version: 12.5 © 7441-7123 [employer]wise, Incorporated. Care instructions adapted under license by [employer] (which disclaims liability or warranty for this information). If you have questions about a medical condition or this instruction, always ask your healthcare professional. [place] [address] any warranty or liability for your use of this information.

## 2020-08-02 LAB — BACTERIA UR CULT: NO GROWTH

## 2020-08-03 RX ORDER — DICLOFENAC SODIUM 75 MG/1
TABLET, DELAYED RELEASE ORAL
Qty: 60 TAB | Refills: 1 | Status: SHIPPED | OUTPATIENT
Start: 2020-08-03 | End: 2020-10-02

## 2020-08-03 NOTE — PROGRESS NOTES
CT scan is normal so that argues for the pain being a musculoskeletal pain which the diclofenac should help. Follow-up as scheduled or sooner should symptoms increase do not resolve. Patient informed of the results. States he is doing better.   Requests a refill of his anti inflammatory and that refill was sent to Dr. Shanda Nelson for approval.

## 2020-08-03 NOTE — TELEPHONE ENCOUNTER
RX refill request from the patient/pharmacy. Patient last seen 07-31--2020 with labs, and next appt. scheduled for 10-  Requested Prescriptions     Pending Prescriptions Disp Refills    diclofenac EC (VOLTAREN) 75 mg EC tablet 60 Tab 1     Sig: Take one tablet twice a day as needed. Artemio Campuzano

## 2020-10-02 RX ORDER — DICLOFENAC SODIUM 75 MG/1
TABLET, DELAYED RELEASE ORAL
Qty: 180 TAB | Refills: 3 | Status: SHIPPED | OUTPATIENT
Start: 2020-10-02 | End: 2020-10-14 | Stop reason: ALTCHOICE

## 2020-10-02 NOTE — TELEPHONE ENCOUNTER
RX refill request from the patient/pharmacy. Patient last seen 07- with labs, and next appt. scheduled for 10-  Requested Prescriptions     Pending Prescriptions Disp Refills    diclofenac EC (VOLTAREN) 75 mg EC tablet [Pharmacy Med Name: DICLOFENAC SOD EC 75 MG TAB] 180 Tab 3     Sig: TAKE 1 TABLET BY MOUTH TWICE A DAY AS NEEDED   .

## 2020-10-13 NOTE — PROGRESS NOTES
Chief Complaint   Patient presents with    Hypertension     6 month f/u       SUBJECTIVE:    Debi Solano is a 63 y.o. male who returns in follow-up of his medical problems include hypertension, hyperlipidemia, allergic rhinitis, obesity, rheumatoid arthritis and other medical problems. He is taking his medications and trying to follow his diet and try and remain physically active. He did have his flu shot already and he had either a shingles shot or pneumonia shot he is not exactly sure which was given by the pharmacy but I asked him to find out and let me know. He currently denies any chest pain, shortness of breath, palpitations, PND, orthopnea or other cardiac or respiratory complaints. He notes no GI or  complaints. He has no headaches, dizziness or neurologic complaints. His arthritic complaints are under control and and no other complaints on complete review of systems. Current Outpatient Medications   Medication Sig Dispense Refill    EnbreL SureClick 50 mg/mL (1 mL) injection       lisinopriL (PRINIVIL, ZESTRIL) 20 mg tablet Take 1 Tab by mouth daily. 90 Tab 2    levothyroxine (SYNTHROID) 50 mcg tablet TAKE 1 TABLET BY MOUTH EVERY DAY 90 Tab 2    loratadine (CLARITIN) 10 mg tablet Take 1 Tab by mouth daily. 90 Tab 2    fluticasone propionate (FLONASE) 50 mcg/actuation nasal spray USE 2 SPRAYS IN EACH NOSTRIL AT BEDTIME 3 Bottle 2    cyclobenzaprine (FLEXERIL) 10 mg tablet Take 1 Tab by mouth three (3) times daily as needed for Muscle Spasm(s). 30 Tab 1    aspirin delayed-release 81 mg tablet Take  by mouth daily. Past Medical History:   Diagnosis Date    DDD (degenerative disc disease), lumbar     Elevated BP     1/20/17 on no meds at this time    Hypothyroid     MARYAN on CPAP     RA (rheumatoid arthritis) ([facility] [address]      History reviewed. No pertinent surgical history.   Allergies   Allergen Reactions    Sulfa (Sulfonamide Antibiotics) Nausea and Vomiting       REVIEW OF SYSTEMS:  General: negative for - chills or fever, or weight loss or gain  ENT: negative for - headaches, nasal congestion or tinnitus  Eyes: no blurred or visual changes  Neck: No stiffness or swollen nodes  Respiratory: negative for - cough, hemoptysis, shortness of breath or wheezing  Cardiovascular : negative for - chest pain, edema, palpitations or shortness of breath  Gastrointestinal: negative for - abdominal pain, blood in stools, heartburn or nausea/vomiting  Genito-Urinary: no dysuria, trouble voiding, or hematuria  Musculoskeletal: negative for - gait disturbance, joint pain, joint stiffness or joint swelling  Neurological: no TIA or stroke symptoms  Hematologic: no bruises, no bleeding  Lymphatic: no swollen glands  Integument: no lumps, mole changes, nail changes or rash  Endocrine:no malaise/lethargy poly uria or polydipsia or unexpected weight changes        Social History     Socioeconomic History    Marital status:      Spouse name: Not on file    Number of children: Not on file    Years of education: Not on file    Highest education level: Not on file   Tobacco Use    Smoking status: Never Smoker    Smokeless tobacco: Never Used   Substance and Sexual Activity    Alcohol use: No    Drug use: No     Family History   Problem Relation Age of Onset    Hypertension Mother        OBJECTIVE:     Visit Vitals  /86 (BP 1 Location: Left arm, BP Patient Position: Sitting)   Pulse 74   Temp 98.8 °F (37.1 °C) (Oral)   Resp 18   Ht 5' 10\" (1.778 m)   Wt 233 lb 9.6 oz (106 kg)   SpO2 98%   BMI 33.52 kg/m²     CONSTITUTIONAL:   well nourished, appears age appropriate  EYES: sclera anicteric, PERRL, EOMI  ENMT:nares clear, moist mucous membranes, pharynx clear  NECK: supple.  Thyroid normal, No JVD or bruits  RESPIRATORY: Chest: clear to ascultation and percussion, normal inspiratory effort  CARDIOVASCULAR: Heart: regular rate and rhythm no murmurs, rubs or gallops, PMI not displaced, No thrills, no peripheral edema  GASTROINTESTINAL: Abdomen: non distended, soft, non tender, bowel sounds normal  HEMATOLOGIC: no purpura, petechiae or bruising  LYMPHATIC: No lymph node enlargemant  MUSCULOSKELETAL: Extremities: no active synovitis, pulse 1+   INTEGUMENT: No unusual rashes or suspicious skin lesions noted. Nails appear normal.  PERIPHERAL VASCULAR: normal pulses femoral, PT and DP  NEUROLOGIC: non-focal exam, A & O X 3  PSYCHIATRIC:, appropriate affect     ASSESSMENT:   1. Essential hypertension    2. Dyslipidemia    3. Rheumatoid arthritis involving multiple sites with positive rheumatoid factor (HCC)    4. Class 1 obesity due to excess calories without serious comorbidity with body mass index (BMI) of 30.0 to 30.9 in adult    5. Acquired hypothyroidism      Impression  1. Hypertension that is controlled so continue current therapy reviewed with him  2. Dyslipidemia prior lab reviewed and repeat status pending I will adjust if needed. 3.  Rheumatoid arthritis still followed by his rheumatologist in stable  4. Obesity we did discuss diet, exercise and weight reduction for overall health benefit. 5.  Hypothyroidism stable on last check  I reviewed his vaccines and he is up-to-date on flu shot and he did receive his first Prevnar 13 at the pharmacy in September  I will recheck him again myself in 3 months or sooner should it be a problem. I will call her lab results in interim. PLAN:  .  Orders Placed This Encounter    [facility])    LIPID PANEL ([facility])         ATTENTION:   This medical record was transcribed using an electronic medical records system. Although proofread, it may and can contain electronic and spelling errors. Other human spelling and other errors may be present. Corrections may be executed at a later time. Please feel free to contact us for any clarifications as needed.       Follow-up and Dispositions    · Return in about 3 months (around 1/14/2021). No results found for any visits on 10/14/20. Ambrocio Bourgeois MD    The patient verbalized understanding of the problems and plans as explained.

## 2020-10-14 ENCOUNTER — OFFICE VISIT (OUTPATIENT)
Dept: INTERNAL MEDICINE CLINIC | Age: 55
End: 2020-10-14
Payer: COMMERCIAL

## 2020-10-14 VITALS
BODY MASS INDEX: 33.44 KG/M2 | OXYGEN SATURATION: 98 % | HEIGHT: 70 IN | RESPIRATION RATE: 18 BRPM | TEMPERATURE: 98.8 F | DIASTOLIC BLOOD PRESSURE: 86 MMHG | WEIGHT: 233.6 LBS | HEART RATE: 74 BPM | SYSTOLIC BLOOD PRESSURE: 123 MMHG

## 2020-10-14 DIAGNOSIS — E66.09 CLASS 1 OBESITY DUE TO EXCESS CALORIES WITHOUT SERIOUS COMORBIDITY WITH BODY MASS INDEX (BMI) OF 30.0 TO 30.9 IN ADULT: ICD-10-CM

## 2020-10-14 DIAGNOSIS — I10 ESSENTIAL HYPERTENSION: Primary | ICD-10-CM

## 2020-10-14 DIAGNOSIS — E03.9 ACQUIRED HYPOTHYROIDISM: ICD-10-CM

## 2020-10-14 DIAGNOSIS — M05.79 RHEUMATOID ARTHRITIS INVOLVING MULTIPLE SITES WITH POSITIVE RHEUMATOID FACTOR (HCC): ICD-10-CM

## 2020-10-14 DIAGNOSIS — E78.5 DYSLIPIDEMIA: ICD-10-CM

## 2020-10-14 LAB
A-G RATIO,AGRAT: 1.6 RATIO
ALBUMIN SERPL-MCNC: 4.9 G/DL (ref 3.9–5.4)
ALP SERPL-CCNC: 68 U/L (ref 38–126)
ALT SERPL-CCNC: 44 U/L (ref 0–50)
ANION GAP SERPL CALC-SCNC: 12 MMOL/L
AST SERPL W P-5'-P-CCNC: 30 U/L (ref 14–36)
BILIRUB SERPL-MCNC: 0.6 MG/DL (ref 0.2–1.3)
BUN SERPL-MCNC: 19 MG/DL (ref 9–20)
BUN/CREATININE RATIO,BUCR: 15 RATIO
CALCIUM SERPL-MCNC: 9.4 MG/DL (ref 8.4–10.2)
CHLORIDE SERPL-SCNC: 104 MMOL/L (ref 98–107)
CHOL/HDL RATIO,CHHD: 5 RATIO (ref 0–4)
CHOLEST SERPL-MCNC: 215 MG/DL (ref 0–200)
CO2 SERPL-SCNC: 30 MMOL/L (ref 22–32)
CREAT SERPL-MCNC: 1.3 MG/DL (ref 0.8–1.5)
GLOBULIN,GLOB: 3.1
GLUCOSE SERPL-MCNC: 110 MG/DL (ref 75–110)
HDLC SERPL-MCNC: 43 MG/DL (ref 35–130)
LDL/HDL RATIO,LDHD: 3 RATIO
LDLC SERPL CALC-MCNC: 142 MG/DL (ref 0–130)
POTASSIUM SERPL-SCNC: 5.8 MMOL/L (ref 3.6–5)
PROT SERPL-MCNC: 8 G/DL (ref 6.3–8.2)
SODIUM SERPL-SCNC: 146 MMOL/L (ref 137–145)
TRIGL SERPL-MCNC: 149 MG/DL (ref 0–200)
VLDLC SERPL CALC-MCNC: 30 MG/DL

## 2020-10-14 PROCEDURE — 99214 OFFICE O/P EST MOD 30 MIN: CPT | Performed by: INTERNAL MEDICINE

## 2020-10-14 PROCEDURE — 80061 LIPID PANEL: CPT | Performed by: INTERNAL MEDICINE

## 2020-10-14 PROCEDURE — 80053 COMPREHEN METABOLIC PANEL: CPT | Performed by: INTERNAL MEDICINE

## 2020-10-14 NOTE — PATIENT INSTRUCTIONS
DASH Diet: Care Instructions Your Care Instructions The DASH diet is an eating plan that can help lower your blood pressure. DASH stands for Dietary Approaches to Stop Hypertension. Hypertension is high blood pressure. The DASH diet focuses on eating foods that are high in calcium, potassium, and magnesium. These nutrients can lower blood pressure. The foods that are highest in these nutrients are fruits, vegetables, low-fat dairy products, nuts, seeds, and legumes. But taking calcium, potassium, and magnesium supplements instead of eating foods that are high in those nutrients does not have the same effect. The DASH diet also includes whole grains, fish, and poultry. The DASH diet is one of several lifestyle changes your doctor may recommend to lower your high blood pressure. Your doctor may also want you to decrease the amount of sodium in your diet. Lowering sodium while following the DASH diet can lower blood pressure even further than just the DASH diet alone. Follow-up care is a key part of your treatment and safety. Be sure to make and go to all appointments, and call your doctor if you are having problems. It's also a good idea to know your test results and keep a list of the medicines you take. How can you care for yourself at home? Following the DASH diet · Eat 4 to 5 servings of fruit each day. A serving is 1 medium-sized piece of fruit, ½ cup chopped or canned fruit, 1/4 cup dried fruit, or 4 ounces (½ cup) of fruit juice. Choose fruit more often than fruit juice. · Eat 4 to 5 servings of vegetables each day. A serving is 1 cup of lettuce or raw leafy vegetables, ½ cup of chopped or cooked vegetables, or 4 ounces (½ cup) of vegetable juice. Choose vegetables more often than vegetable juice. · Get 2 to 3 servings of low-fat and fat-free dairy each day. A serving is 8 ounces of milk, 1 cup of yogurt, or 1 ½ ounces of cheese. · Eat 6 to 8 servings of grains each day. A serving is 1 slice of bread, 1 ounce of dry cereal, or ½ cup of cooked rice, pasta, or cooked cereal. Try to choose whole-grain products as much as possible. · Limit lean meat, poultry, and fish to 2 servings each day. A serving is 3 ounces, about the size of a deck of cards. · Eat 4 to 5 servings of nuts, seeds, and legumes (cooked dried beans, lentils, and split peas) each week. A serving is 1/3 cup of nuts, 2 tablespoons of seeds, or ½ cup of cooked beans or peas. · Limit fats and oils to 2 to 3 servings each day. A serving is 1 teaspoon of vegetable oil or 2 tablespoons of salad dressing. · Limit sweets and added sugars to 5 servings or less a week. A serving is 1 tablespoon jelly or jam, ½ cup sorbet, or 1 cup of lemonade. · Eat less than 2,300 milligrams (mg) of sodium a day. If you limit your sodium to 1,500 mg a day, you can lower your blood pressure even more. Tips for success · Start small. Do not try to make dramatic changes to your diet all at once. You might feel that you are missing out on your favorite foods and then be more likely to not follow the plan. Make small changes, and stick with them. Once those changes become habit, add a few more changes. · Try some of the following: ? Make it a goal to eat a fruit or vegetable at every meal and at snacks. This will make it easy to get the recommended amount of fruits and vegetables each day. ? Try yogurt topped with fruit and nuts for a snack or healthy dessert. ? Add lettuce, tomato, cucumber, and onion to sandwiches. ? Combine a ready-made pizza crust with low-fat mozzarella cheese and lots of vegetable toppings. Try using tomatoes, squash, spinach, broccoli, carrots, cauliflower, and onions. ? Have a variety of cut-up vegetables with a low-fat dip as an appetizer instead of chips and dip. ? Sprinkle sunflower seeds or chopped almonds over salads.  Or try adding chopped walnuts or almonds to cooked vegetables. ? Try some vegetarian meals using beans and peas. Add garbanzo or kidney beans to salads. Make burritos and tacos with mashed schmidt beans or black beans. Where can you learn more? Go to http://www.gray.com/ Enter E560 in the search box to learn more about \"DASH Diet: Care Instructions. \" Current as of: December 16, 2019               Content Version: 12.6 © 8427-6337 [employer]. Care instructions adapted under license by [employer] (which disclaims liability or warranty for this information). If you have questions about a medical condition or this instruction, always ask your healthcare professional. [address] any warranty or liability for your use of this information.

## 2020-10-15 NOTE — PROGRESS NOTES
LDL higher so watch diet or will need medications. K is elevated so re check K in 2 weeks. Discussed with patient and scheduled for fup BMP IN 2 weeks.

## 2020-10-16 RX ORDER — CYCLOBENZAPRINE HCL 10 MG
10 TABLET ORAL
Qty: 30 TAB | Refills: 1 | Status: SHIPPED | OUTPATIENT
Start: 2020-10-16 | End: 2020-10-28 | Stop reason: SDUPTHER

## 2020-10-16 NOTE — TELEPHONE ENCOUNTER
RX refill request from the patient/pharmacy. Patient last seen 10- with labs, and next appt. scheduled for 10-  Requested Prescriptions     Pending Prescriptions Disp Refills    cyclobenzaprine (FLEXERIL) 10 mg tablet 30 Tab 1     Sig: Take 1 Tab by mouth three (3) times daily as needed for Muscle Spasm(s).    Karyn Levine

## 2020-10-28 RX ORDER — CYCLOBENZAPRINE HCL 10 MG
10 TABLET ORAL
Qty: 30 TAB | Refills: 1 | Status: SHIPPED | OUTPATIENT
Start: 2020-10-28 | End: 2022-02-14

## 2020-10-28 NOTE — TELEPHONE ENCOUNTER
RX refill request from the patient/pharmacy. Patient last seen 10- with labs, and next appt. scheduled for 10-  Requested Prescriptions     Pending Prescriptions Disp Refills    cyclobenzaprine (FLEXERIL) 10 mg tablet 30 Tab 1     Sig: Take 1 Tab by mouth three (3) times daily as needed for Muscle Spasm(s).    Saritha Colvin

## 2020-10-30 ENCOUNTER — LAB ONLY (OUTPATIENT)
Dept: INTERNAL MEDICINE CLINIC | Age: 55
End: 2020-10-30
Payer: COMMERCIAL

## 2020-10-30 DIAGNOSIS — R79.9 ABNORMAL BLOOD CHEMISTRY: Primary | ICD-10-CM

## 2020-10-30 LAB
ANION GAP SERPL CALC-SCNC: 11 MMOL/L
BUN SERPL-MCNC: 19 MG/DL (ref 9–20)
CALCIUM SERPL-MCNC: 9.7 MG/DL (ref 8.4–10.2)
CHLORIDE SERPL-SCNC: 104 MMOL/L (ref 98–107)
CO2 SERPL-SCNC: 30 MMOL/L (ref 22–32)
CREAT SERPL-MCNC: 1.2 MG/DL (ref 0.8–1.5)
GLUCOSE SERPL-MCNC: 82 MG/DL (ref 75–110)
POTASSIUM SERPL-SCNC: 4.7 MMOL/L (ref 3.6–5)
SODIUM SERPL-SCNC: 145 MMOL/L (ref 137–145)

## 2020-10-30 PROCEDURE — 80048 BASIC METABOLIC PNL TOTAL CA: CPT | Performed by: INTERNAL MEDICINE

## 2020-12-24 DIAGNOSIS — I10 ESSENTIAL HYPERTENSION: ICD-10-CM

## 2020-12-24 RX ORDER — LISINOPRIL 20 MG/1
TABLET ORAL
Qty: 90 TAB | Refills: 2 | Status: SHIPPED | OUTPATIENT
Start: 2020-12-24 | End: 2021-09-29

## 2020-12-24 RX ORDER — LEVOTHYROXINE SODIUM 50 UG/1
TABLET ORAL
Qty: 90 TAB | Refills: 2 | Status: SHIPPED | OUTPATIENT
Start: 2020-12-24 | End: 2021-09-29

## 2020-12-24 NOTE — TELEPHONE ENCOUNTER
PCP: Amrit Nieves MD    Last appt: 7/31/2020  Future Appointments   Date Time Provider Nathan Patel   1/19/2021  8:20 AM Amrit Nieves MD [facility]       Requested Prescriptions     Pending Prescriptions Disp Refills    levothyroxine (SYNTHROID) 50 mcg tablet [Pharmacy Med Name: LEVOTHYROXINE 0.05MG (50MCG) TAB] 90 Tab 2     Sig: TAKE 1 TABLET BY MOUTH EVERY DAY.     lisinopriL (PRINIVIL, ZESTRIL) 20 mg tablet [Pharmacy Med Name: LISINOPRIL 20MG TABLETS] 90 Tab 2     Sig: TAKE 1 TABLET BY MOUTH DAILY

## 2021-01-21 NOTE — PROGRESS NOTES
Chief Complaint   Patient presents with    Hypertension     3 month f/up    Thyroid Problem    Cholesterol Problem       SUBJECTIVE:    Luiza Cantrell is a 63 y.o. male turns in follow-up of his medical problems include hypertension, dyslipidemia, allergic rhinitis, rheumatoid arthritis, hypothyroidism, obesity and other medical problems. He is taking his medications and trying to follow his diet and remain physically active and is now walking on a regular basis since the first of the month. He currently denies any chest pain, shortness of breath, palpitations, PND, orthopnea or other cardiorespiratory complaints. He also denies any GI or  complaints. He has no headaches, dizziness or neurologic complaints. There are no current active arthritic complaints and there are no other complaints on complete review of systems. Current Outpatient Medications   Medication Sig Dispense Refill    levothyroxine (SYNTHROID) 50 mcg tablet TAKE 1 TABLET BY MOUTH EVERY DAY. 90 Tab 2    lisinopriL (PRINIVIL, ZESTRIL) 20 mg tablet TAKE 1 TABLET BY MOUTH DAILY 90 Tab 2    cyclobenzaprine (FLEXERIL) 10 mg tablet Take 1 Tab by mouth three (3) times daily as needed for Muscle Spasm(s). 30 Tab 1    EnbreL SureClick 50 mg/mL (1 mL) injection       loratadine (CLARITIN) 10 mg tablet Take 1 Tab by mouth daily. 90 Tab 2    fluticasone propionate (FLONASE) 50 mcg/actuation nasal spray USE 2 SPRAYS IN EACH NOSTRIL AT BEDTIME 3 Bottle 2    aspirin delayed-release 81 mg tablet Take  by mouth daily. Past Medical History:   Diagnosis Date    DDD (degenerative disc disease), lumbar     Elevated BP     1/20/17 on no meds at this time    Hypothyroid     MARYAN on CPAP     RA (rheumatoid arthritis) ([facility] [address]      History reviewed. No pertinent surgical history.   Allergies   Allergen Reactions    Sulfa (Sulfonamide Antibiotics) Nausea and Vomiting       REVIEW OF SYSTEMS:  General: negative for - chills or fever, or weight loss or gain  ENT: negative for - headaches, nasal congestion or tinnitus  Eyes: no blurred or visual changes  Neck: No stiffness or swollen nodes  Respiratory: negative for - cough, hemoptysis, shortness of breath or wheezing  Cardiovascular : negative for - chest pain, edema, palpitations or shortness of breath  Gastrointestinal: negative for - abdominal pain, blood in stools, heartburn or nausea/vomiting  Genito-Urinary: no dysuria, trouble voiding, or hematuria  Musculoskeletal: negative for - gait disturbance, joint pain, joint stiffness or joint swelling  Neurological: no TIA or stroke symptoms  Hematologic: no bruises, no bleeding  Lymphatic: no swollen glands  Integument: no lumps, mole changes, nail changes or rash  Endocrine:no malaise/lethargy poly uria or polydipsia or unexpected weight changes        Social History     Socioeconomic History    Marital status:      Spouse name: Not on file    Number of children: Not on file    Years of education: Not on file    Highest education level: Not on file   Tobacco Use    Smoking status: Never Smoker    Smokeless tobacco: Never Used   Substance and Sexual Activity    Alcohol use: No    Drug use: No     Family History   Problem Relation Age of Onset    Hypertension Mother        OBJECTIVE:     Visit Vitals  /78 (BP 1 Location: Left arm, BP Patient Position: Sitting)   Pulse 76   Temp 97.9 °F (36.6 °C)   Resp 16   Ht 5' 10\" (1.778 m)   Wt 231 lb 6.4 oz (105 kg)   SpO2 98%   BMI 33.20 kg/m²     CONSTITUTIONAL:   well nourished, appears age appropriate  EYES: sclera anicteric, PERRL, EOMI  ENMT:nares clear, moist mucous membranes, pharynx clear  NECK: supple.  Thyroid normal, No JVD or bruits  RESPIRATORY: Chest: clear to ascultation and percussion, normal inspiratory effort  CARDIOVASCULAR: Heart: regular rate and rhythm no murmurs, rubs or gallops, PMI not displaced, No thrills, no peripheral edema  GASTROINTESTINAL: Abdomen: non distended, soft, non tender, bowel sounds normal  HEMATOLOGIC: no purpura, petechiae or bruising  LYMPHATIC: No lymph node enlargemant  MUSCULOSKELETAL: Extremities: no active synovitis, pulse 1+   INTEGUMENT: No unusual rashes or suspicious skin lesions noted. Nails appear normal.  PERIPHERAL VASCULAR: normal pulses femoral, PT and DP  NEUROLOGIC: non-focal exam, A & O X 3  PSYCHIATRIC:, appropriate affect     ASSESSMENT:   1. Essential hypertension    2. Dyslipidemia    3. Class 1 obesity due to excess calories without serious comorbidity with body mass index (BMI) of 30.0 to 30.9 in adult    4. Rheumatoid arthritis involving multiple sites with positive rheumatoid factor (HCC)      Impression  1. Hypertension that is controlled so continue current therapy reviewed with him. 2.  Dyslipidemia prior lab reviewed and repeat status pending I will adjust if needed. 3.  Obesity I did discuss diet, exercise and weight reduction for overall health benefit. 4.  Rheumatoid arthritis that is stable  5. Allergic rhinitis stable  I will call with lab and follow stable continue same and follow-up scheduled again for 3 months or sooner if there is a problem. PLAN:  .  Orders Placed This Encounter    METABOLIC PANEL, COMPREHENSIVE ([facility])    LIPID PANEL ([facility])    CK ([facility])         ATTENTION:   This medical record was transcribed using an electronic medical records system. Although proofread, it may and can contain electronic and spelling errors. Other human spelling and other errors may be present. Corrections may be executed at a later time. Please feel free to contact us for any clarifications as needed. Follow-up and Dispositions    · Return in about 3 months (around 4/22/2021). No results found for any visits on 01/22/21. Pat Black MD    The patient verbalized understanding of the problems and plans as explained.

## 2021-01-22 ENCOUNTER — OFFICE VISIT (OUTPATIENT)
Dept: INTERNAL MEDICINE CLINIC | Age: 56
End: 2021-01-22
Payer: COMMERCIAL

## 2021-01-22 VITALS
HEART RATE: 76 BPM | TEMPERATURE: 97.9 F | OXYGEN SATURATION: 98 % | DIASTOLIC BLOOD PRESSURE: 78 MMHG | WEIGHT: 231.4 LBS | SYSTOLIC BLOOD PRESSURE: 116 MMHG | RESPIRATION RATE: 16 BRPM | HEIGHT: 70 IN | BODY MASS INDEX: 33.13 KG/M2

## 2021-01-22 DIAGNOSIS — E78.5 DYSLIPIDEMIA: ICD-10-CM

## 2021-01-22 DIAGNOSIS — I10 ESSENTIAL HYPERTENSION: Primary | ICD-10-CM

## 2021-01-22 DIAGNOSIS — M05.79 RHEUMATOID ARTHRITIS INVOLVING MULTIPLE SITES WITH POSITIVE RHEUMATOID FACTOR (HCC): ICD-10-CM

## 2021-01-22 DIAGNOSIS — E66.09 CLASS 1 OBESITY DUE TO EXCESS CALORIES WITHOUT SERIOUS COMORBIDITY WITH BODY MASS INDEX (BMI) OF 30.0 TO 30.9 IN ADULT: ICD-10-CM

## 2021-01-22 LAB
A-G RATIO,AGRAT: 1.7 RATIO
ALBUMIN SERPL-MCNC: 4.9 G/DL (ref 3.9–5.4)
ALP SERPL-CCNC: 70 U/L (ref 38–126)
ALT SERPL-CCNC: 26 U/L (ref 0–50)
ANION GAP SERPL CALC-SCNC: 18 MMOL/L
AST SERPL W P-5'-P-CCNC: 24 U/L (ref 14–36)
BILIRUB SERPL-MCNC: 0.6 MG/DL (ref 0.2–1.3)
BUN SERPL-MCNC: 27 MG/DL (ref 9–20)
BUN/CREATININE RATIO,BUCR: 23 RATIO
CALCIUM SERPL-MCNC: 10 MG/DL (ref 8.4–10.2)
CHLORIDE SERPL-SCNC: 99 MMOL/L (ref 98–107)
CHOL/HDL RATIO,CHHD: 4 RATIO (ref 0–4)
CHOLEST SERPL-MCNC: 211 MG/DL (ref 0–200)
CK SERPL-CCNC: 128 U/L (ref 30–135)
CO2 SERPL-SCNC: 28 MMOL/L (ref 22–32)
CREAT SERPL-MCNC: 1.2 MG/DL (ref 0.8–1.5)
GLOBULIN,GLOB: 2.9
GLUCOSE SERPL-MCNC: 100 MG/DL (ref 75–110)
HDLC SERPL-MCNC: 52 MG/DL (ref 35–130)
LDL/HDL RATIO,LDHD: 2 RATIO
LDLC SERPL CALC-MCNC: 130 MG/DL (ref 0–130)
POTASSIUM SERPL-SCNC: 6 MMOL/L (ref 3.6–5)
PROT SERPL-MCNC: 7.8 G/DL (ref 6.3–8.2)
SODIUM SERPL-SCNC: 145 MMOL/L (ref 137–145)
TRIGL SERPL-MCNC: 147 MG/DL (ref 0–200)
VLDLC SERPL CALC-MCNC: 29 MG/DL

## 2021-01-22 PROCEDURE — 82550 ASSAY OF CK (CPK): CPT | Performed by: INTERNAL MEDICINE

## 2021-01-22 PROCEDURE — 80061 LIPID PANEL: CPT | Performed by: INTERNAL MEDICINE

## 2021-01-22 PROCEDURE — 80053 COMPREHEN METABOLIC PANEL: CPT | Performed by: INTERNAL MEDICINE

## 2021-01-22 PROCEDURE — 99214 OFFICE O/P EST MOD 30 MIN: CPT | Performed by: INTERNAL MEDICINE

## 2021-01-22 NOTE — PROGRESS NOTES
Labs are okay except for an elevated potassium. He has had that problem before but it was okay on last visit.   At this point make sure not taking any potassium supplements and recheck potassium in 1 week

## 2021-01-22 NOTE — PATIENT INSTRUCTIONS
Allergies: Care Instructions Your Care Instructions Allergies occur when your body's defense system (immune system) overreacts to certain substances. The immune system treats a harmless substance as if it were a harmful germ or virus. Many things can make this happen. These include pollens, medicine, food, dust, animal dander, and mold. Allergies can be mild or severe. Mild allergies can be managed with home treatment. But medicine may be needed to prevent problems. Managing your allergies is an important part of staying healthy. Your doctor may suggest that you have allergy testing to help find out what is causing your allergies. Severe allergies can cause reactions that affect your whole body (anaphylactic reactions). Your doctor may prescribe a shot of epinephrine to carry with you in case you have a severe reaction. Learn how to give yourself the shot and keep it with you at all times. Make sure it is not . Follow-up care is a key part of your treatment and safety. Be sure to make and go to all appointments, and call your doctor if you are having problems. It's also a good idea to know your test results and keep a list of the medicines you take. How can you care for yourself at home? · If you have been told by your doctor that dust or dust mites are causing your allergy, decrease the dust around your bed: 
? Wash sheets, pillowcases, and other bedding in hot water every week. ? Use dust-proof covers for pillows, duvets, and mattresses. Avoid plastic covers because they tear easily and do not \"breathe. \" Wash as instructed on the label. ? Do not use any blankets and pillows that you do not need. ? Use blankets that you can wash in your washing machine. ? Consider removing drapes and carpets, which attract and hold dust, from your bedroom. · If you are allergic to house dust and mites, do not use home humidifiers. Your doctor can suggest ways you can control dust and mites. · Look for signs of cockroaches. Cockroaches cause allergic reactions. Use cockroach baits to get rid of them. Then, clean your home well. Cockroaches like areas where grocery bags, newspapers, empty bottles, or cardboard boxes are stored. Do not keep these inside your home, and keep trash and food containers sealed. Seal off any spots where cockroaches might enter your home. · If you are allergic to mold, get rid of furniture, rugs, and drapes that smell musty. Check for mold in the bathroom. · If you are allergic to outdoor pollen or mold spores, use air-conditioning. Change or clean all filters every month. Keep windows closed. · If you are allergic to pollen, stay inside when pollen counts are high. Use a vacuum  with a HEPA filter or a double-thickness filter at least two times each week. · Stay inside when air pollution is bad. Avoid paint fumes, perfumes, and other strong odors. · Avoid conditions that make your allergies worse. Stay away from smoke. Do not smoke or let anyone else smoke in your house. Do not use fireplaces or wood-burning stoves. · If you are allergic to your pets, change the air filter in your furnace every month. Use high-efficiency filters. · If you are allergic to pet dander, keep pets outside or out of your bedroom. Old carpet and cloth furniture can hold a lot of animal dander. You may need to replace them. When should you call for help? Give an epinephrine shot if: 
  · You think you are having a severe allergic reaction.  
  · You have symptoms in more than one body area, such as mild nausea and an itchy mouth. After giving an epinephrine shot call 911, even if you feel better. Call 911 if: 
  · You have symptoms of a severe allergic reaction. These may include: 
? Sudden raised, red areas (hives) all over your body. ? Swelling of the throat, mouth, lips, or tongue. ? Trouble breathing. ? Passing out (losing consciousness). Or you may feel very lightheaded or suddenly feel weak, confused, or restless.  
  · You have been given an epinephrine shot, even if you feel better. Call your doctor now or seek immediate medical care if: 
  · You have symptoms of an allergic reaction, such as: ? A rash or hives (raised, red areas on the skin). ? Itching. ? Swelling. ? Belly pain, nausea, or vomiting. Watch closely for changes in your health, and be sure to contact your doctor if: 
  · You do not get better as expected. Where can you learn more? Go to http://www.gray.com/ Enter N914 in the search box to learn more about \"Allergies: Care Instructions. \" Current as of: June 29, 2020               Content Version: 12.6 © 9948-9779 [employer]. Care instructions adapted under license by [employer] (which disclaims liability or warranty for this information). If you have questions about a medical condition or this instruction, always ask your healthcare professional. [address] any warranty or liability for your use of this information.

## 2021-01-22 NOTE — PROGRESS NOTES
Chief Complaint   Patient presents with    Hypertension     3 month f/up    Thyroid Problem    Cholesterol Problem     1. Have you been to the ER, urgent care clinic since your last visit? Hospitalized since your last visit? No    2. Have you seen or consulted any other health care providers outside of the [address] [place] [address] since your last visit? Include any pap smears or colon screening.  No

## 2021-01-25 NOTE — PROGRESS NOTES
Labs are okay except for an elevated potassium. He has had that problem before but it was okay on last visit. At this point make sure not taking any potassium supplements and recheck potassium in 1 week. Discussed with patient. Scheduled for [facility] 2-5-2021.

## 2021-02-05 ENCOUNTER — LAB ONLY (OUTPATIENT)
Dept: INTERNAL MEDICINE CLINIC | Age: 56
End: 2021-02-05
Payer: COMMERCIAL

## 2021-02-05 DIAGNOSIS — R79.9 ABNORMAL BLOOD CHEMISTRY: Primary | ICD-10-CM

## 2021-02-05 LAB
ANION GAP SERPL CALC-SCNC: 13 MMOL/L
BUN SERPL-MCNC: 21 MG/DL (ref 9–20)
CALCIUM SERPL-MCNC: 9.7 MG/DL (ref 8.4–10.2)
CHLORIDE SERPL-SCNC: 100 MMOL/L (ref 98–107)
CO2 SERPL-SCNC: 30 MMOL/L (ref 22–32)
CREAT SERPL-MCNC: 1.1 MG/DL (ref 0.8–1.5)
GLUCOSE SERPL-MCNC: 84 MG/DL (ref 75–110)
POTASSIUM SERPL-SCNC: 4.6 MMOL/L (ref 3.6–5)
SODIUM SERPL-SCNC: 143 MMOL/L (ref 137–145)

## 2021-02-05 PROCEDURE — 80048 BASIC METABOLIC PNL TOTAL CA: CPT | Performed by: INTERNAL MEDICINE

## 2021-05-06 PROBLEM — I10 ESSENTIAL HYPERTENSION: Status: RESOLVED | Noted: 2018-03-19 | Resolved: 2021-05-06

## 2021-05-06 NOTE — PROGRESS NOTES
HPI:   63 y.o.  presents for comprehensive annual healthcare examination and follow up appointment. No hospital, ER or specialist visits since last primary care visit except as noted below. He is followed regularly for hypertension, hyperlipidemia, hypothyroidism, allergic rhinitis, rheumatoid arthritis, obesity and other medical problems. He is taking his medications and trying to follow his diet and exercise and as result has been working on getting his weight down with some success. He currently denies any chest pain, shortness of breath, palpitations, PND, orthopnea or other cardiac or respiratory complaints. He notes no GI or  complaints. He notes no headaches, dizziness or neurologic complaints. He has no current active arthritic complaints and there are no other complaints on complete review of systems. Patient Active Problem List    Diagnosis    Dyslipidemia    Class 1 obesity due to excess calories without serious comorbidity with body mass index (BMI) of 30.0 to 30.9 in adult    Essential hypertension    Rheumatoid arthritis involving multiple sites with positive rheumatoid factor ([facility])    Acquired hypothyroidism    Right lower quadrant abdominal pain    Fatigue    Non-seasonal allergic rhinitis    Annual physical exam    Allergic dermatitis       Past Medical History:   Diagnosis Date    DDD (degenerative disc disease), lumbar     Elevated BP     1/20/17 on no meds at this time    Hypothyroid     MARYAN on CPAP     RA (rheumatoid arthritis) ([facility])        Social History     Tobacco Use    Smoking status: Never Smoker    Smokeless tobacco: Never Used   Substance Use Topics    Alcohol use: No    Drug use: No       Family History   Problem Relation Age of Onset    Hypertension Mother            Allergies   Allergen Reactions    Sulfa (Sulfonamide Antibiotics) Nausea and Vomiting       ROS:     Constitutional: He denies fevers, weight loss, sweats.   Eyes: No blurred or double vision. ENT: No difficulty with swallowing, taste, speech or smell. Neck: no stiffness or swelling  Respiratory: No cough wheezing or shortness of breath. Cardiovascular: Denies chest pain, palpitations, unexplained indigestion or syncope. Gastrointestinal:  No changes in bowel movements, no abdominal pain, no bloating. Genitourinary:  He denies frequency, nocturia or stranguria. Extremities: No joint pain, stiffness or swelling. Neurological:  No numbness, tingling, burring paresthesias or loss of motor strength. No syncope, dizziness or frequent headache  Lymphatic: no adenopathy noted  Hematologic: no easy bruising or bleeding gums  Skin:  No recent rashes or mole changes. Psychiatric/Behavioral:  Negative for depression. The patient is not nervous/anxious. PE:     Vitals:    05/07/21 1020   BP: 131/86   Pulse: 73   Resp: 16   Temp: 98.1 °F (36.7 °C)   TempSrc: Oral   SpO2: 96%   Weight: 217 lb 9.6 oz (98.7 kg)   Height: 5' 10\" (1.778 m)   PainSc:   0 - No pain        General appearance - alert, well appearing, and in no distress  Mental status - alert, oriented to person, place, and time  HEENT:  Ears - bilateral TM's and external ear canals clear  Eyes - pupillary responses were normal.  Extraocular muscle function intact. Lids and conjunctiva not injected. Fundoscopic exam revealed sharp disc margins. eye movements intact  Pharynx- clear with teeth in good repair. No masses were noted  Neck - supple without thyromegaly or burit. No JVD noted  Lungs - clear to auscultation and percussion  Cardiac- normal rate, regular rhythm without murmurs. PMI not displaced. No gallop, rub or click  Abdomen - flat, soft, non-tender without palpable organomegaly or mass. No pulsatile mass was felt, and not bruit was heard.   Bowel sounds were active  : Circumcised, Testes descended w/o masses  Rectal: normal sphincter tone, prostate normal, no masses, stool brown and hemacult negative  Extremities -  no clubbing cyanosis or edema  Lymphatics - no palpable lymphadenopathy, no hepatosplenomegaly  Hematologic: no petechiae or purpura  Peripheral vascular -Femoral, Dorsalis pedis and posterior tibial pulses felt without difficulty  Skin - no rash or unusual mole change noted  Neurological - Cranial nerves II-XII grossly intact. Motor strength 5/5. DTR's 2+ and symmetric. Station and gait normal  Back exam - full range of motion, no tenderness, palpable spasm or pain on motion  Musculoskeletal - no joint tenderness, deformity or swelling      Assessment/Plan:     1. Annual physical exam    2. Essential hypertension    3. Dyslipidemia    4. Rheumatoid arthritis involving multiple sites with positive rheumatoid factor (HCC)    5. Class 1 obesity due to excess calories without serious comorbidity with body mass index (BMI) of 30.0 to 30.9 in adult    6. Acquired hypothyroidism        Impression  1. Annual physical examination is normal  2. Hypertension that is controlled so continue current therapy reviewed with him. 3.  Dyslipidemia repeat status is pending prior lab reviewed now make adjustments if necessary. 4.  Rheumatoid arthritis status stable  5. Obesity weight is down 14 pounds and I encouraged him to continue to work on diet exercise and weight reduction. 6.  Hypothyroidism repeat status pending  I will call the lab and make further recommendations or adjustments if necessary. Follow-up in 3 months or sooner if there is a problem. Health Maintenance reviewed - updated. Orders Placed This Encounter    CBC WITH AUTOMATED DIFF    METABOLIC PANEL, COMPREHENSIVE    LIPID PANEL    CK    T4 (THYROXINE)    TSH 3RD GENERATION    PSA SCREENING (SCREENING)    URINALYSIS W/ REFLEX CULTURE       There are no discontinued medications. Current Outpatient Medications   Medication Sig Dispense Refill    levothyroxine (SYNTHROID) 50 mcg tablet TAKE 1 TABLET BY MOUTH EVERY DAY.  90 Tab 2    lisinopriL (PRINIVIL, ZESTRIL) 20 mg tablet TAKE 1 TABLET BY MOUTH DAILY 90 Tab 2    cyclobenzaprine (FLEXERIL) 10 mg tablet Take 1 Tab by mouth three (3) times daily as needed for Muscle Spasm(s). 30 Tab 1    EnbreL SureClick 50 mg/mL (1 mL) injection       loratadine (CLARITIN) 10 mg tablet Take 1 Tab by mouth daily. 90 Tab 2    fluticasone propionate (FLONASE) 50 mcg/actuation nasal spray USE 2 SPRAYS IN EACH NOSTRIL AT BEDTIME 3 Bottle 2    aspirin delayed-release 81 mg tablet Take  by mouth daily. No results found for any visits on 05/07/21. Recommended healthy diet low in carbohydrates, fats, sodium and cholesterol. Recommended regular cardiovascular exercise 3-6 times per week for 30-60 minutes daily. Verbal and written instructions (see AVS) provided. Patient expresses understanding of diagnosis and treatment plan.       Hiro Cervantes MD

## 2021-05-07 ENCOUNTER — OFFICE VISIT (OUTPATIENT)
Dept: INTERNAL MEDICINE CLINIC | Age: 56
End: 2021-05-07
Payer: COMMERCIAL

## 2021-05-07 VITALS
TEMPERATURE: 98.1 F | HEIGHT: 70 IN | SYSTOLIC BLOOD PRESSURE: 131 MMHG | BODY MASS INDEX: 31.15 KG/M2 | OXYGEN SATURATION: 96 % | DIASTOLIC BLOOD PRESSURE: 86 MMHG | WEIGHT: 217.6 LBS | RESPIRATION RATE: 16 BRPM | HEART RATE: 73 BPM

## 2021-05-07 DIAGNOSIS — I10 ESSENTIAL HYPERTENSION: ICD-10-CM

## 2021-05-07 DIAGNOSIS — M05.79 RHEUMATOID ARTHRITIS INVOLVING MULTIPLE SITES WITH POSITIVE RHEUMATOID FACTOR (HCC): ICD-10-CM

## 2021-05-07 DIAGNOSIS — E66.09 CLASS 1 OBESITY DUE TO EXCESS CALORIES WITHOUT SERIOUS COMORBIDITY WITH BODY MASS INDEX (BMI) OF 30.0 TO 30.9 IN ADULT: ICD-10-CM

## 2021-05-07 DIAGNOSIS — E78.5 DYSLIPIDEMIA: ICD-10-CM

## 2021-05-07 DIAGNOSIS — Z00.00 ANNUAL PHYSICAL EXAM: Primary | ICD-10-CM

## 2021-05-07 DIAGNOSIS — E03.9 ACQUIRED HYPOTHYROIDISM: ICD-10-CM

## 2021-05-07 LAB
ALBUMIN SERPL-MCNC: 4.9 G/DL (ref 3.5–5)
ALBUMIN/GLOB SERPL: 1.5 {RATIO} (ref 1.1–2.2)
ALP SERPL-CCNC: 85 U/L (ref 45–117)
ALT SERPL-CCNC: 39 U/L (ref 12–78)
ANION GAP SERPL CALC-SCNC: 8 MMOL/L (ref 5–15)
APPEARANCE UR: CLEAR
AST SERPL-CCNC: 18 U/L (ref 15–37)
BACTERIA URNS QL MICRO: NEGATIVE /HPF
BASOPHILS # BLD: 0 K/UL (ref 0–0.1)
BASOPHILS NFR BLD: 1 % (ref 0–1)
BILIRUB SERPL-MCNC: 0.5 MG/DL (ref 0.2–1)
BILIRUB UR QL: NEGATIVE
BUN SERPL-MCNC: 21 MG/DL (ref 6–20)
BUN/CREAT SERPL: 20 (ref 12–20)
CALCIUM SERPL-MCNC: 9.5 MG/DL (ref 8.5–10.1)
CHLORIDE SERPL-SCNC: 103 MMOL/L (ref 97–108)
CHOLEST SERPL-MCNC: 224 MG/DL
CK SERPL-CCNC: 117 U/L (ref 39–308)
CO2 SERPL-SCNC: 28 MMOL/L (ref 21–32)
COLOR UR: NORMAL
CREAT SERPL-MCNC: 1.04 MG/DL (ref 0.7–1.3)
DIFFERENTIAL METHOD BLD: ABNORMAL
EOSINOPHIL # BLD: 0.1 K/UL (ref 0–0.4)
EOSINOPHIL NFR BLD: 2 % (ref 0–7)
EPITH CASTS URNS QL MICRO: NORMAL /LPF
ERYTHROCYTE [DISTWIDTH] IN BLOOD BY AUTOMATED COUNT: 12.6 % (ref 11.5–14.5)
GLOBULIN SER CALC-MCNC: 3.2 G/DL (ref 2–4)
GLUCOSE SERPL-MCNC: 85 MG/DL (ref 65–100)
GLUCOSE UR STRIP.AUTO-MCNC: NEGATIVE MG/DL
HCT VFR BLD AUTO: 47.8 % (ref 36.6–50.3)
HDLC SERPL-MCNC: 51 MG/DL
HDLC SERPL: 4.4 {RATIO} (ref 0–5)
HGB BLD-MCNC: 15.7 G/DL (ref 12.1–17)
HGB UR QL STRIP: NEGATIVE
IMM GRANULOCYTES # BLD AUTO: 0 K/UL (ref 0–0.04)
IMM GRANULOCYTES NFR BLD AUTO: 1 % (ref 0–0.5)
KETONES UR QL STRIP.AUTO: NEGATIVE MG/DL
LDLC SERPL CALC-MCNC: 139.6 MG/DL (ref 0–100)
LEUKOCYTE ESTERASE UR QL STRIP.AUTO: NEGATIVE
LIPID PROFILE,FLP: ABNORMAL
LYMPHOCYTES # BLD: 1.8 K/UL (ref 0.8–3.5)
LYMPHOCYTES NFR BLD: 29 % (ref 12–49)
MCH RBC QN AUTO: 31.4 PG (ref 26–34)
MCHC RBC AUTO-ENTMCNC: 32.8 G/DL (ref 30–36.5)
MCV RBC AUTO: 95.6 FL (ref 80–99)
MONOCYTES # BLD: 0.5 K/UL (ref 0–1)
MONOCYTES NFR BLD: 8 % (ref 5–13)
NEUTS SEG # BLD: 3.8 K/UL (ref 1.8–8)
NEUTS SEG NFR BLD: 59 % (ref 32–75)
NITRITE UR QL STRIP.AUTO: NEGATIVE
NRBC # BLD: 0 K/UL (ref 0–0.01)
NRBC BLD-RTO: 0 PER 100 WBC
PH UR STRIP: 6 [PH] (ref 5–8)
PLATELET # BLD AUTO: 295 K/UL (ref 150–400)
PMV BLD AUTO: 10.4 FL (ref 8.9–12.9)
POTASSIUM SERPL-SCNC: 4.4 MMOL/L (ref 3.5–5.1)
PROT SERPL-MCNC: 8.1 G/DL (ref 6.4–8.2)
PROT UR STRIP-MCNC: NEGATIVE MG/DL
PSA SERPL-MCNC: 0.6 NG/ML (ref 0.01–4)
RBC # BLD AUTO: 5 M/UL (ref 4.1–5.7)
RBC #/AREA URNS HPF: NORMAL /HPF (ref 0–5)
SODIUM SERPL-SCNC: 139 MMOL/L (ref 136–145)
SP GR UR REFRACTOMETRY: <1.005 (ref 1–1.03)
T4 SERPL-MCNC: 7.5 UG/DL (ref 4.5–12.1)
TRIGL SERPL-MCNC: 167 MG/DL (ref ?–150)
TSH SERPL DL<=0.05 MIU/L-ACNC: 2.06 UIU/ML (ref 0.36–3.74)
UA: UC IF INDICATED,UAUC: NORMAL
UROBILINOGEN UR QL STRIP.AUTO: 0.2 EU/DL (ref 0.2–1)
VLDLC SERPL CALC-MCNC: 33.4 MG/DL
WBC # BLD AUTO: 6.3 K/UL (ref 4.1–11.1)
WBC URNS QL MICRO: NORMAL /HPF (ref 0–4)

## 2021-05-07 PROCEDURE — 99396 PREV VISIT EST AGE 40-64: CPT | Performed by: INTERNAL MEDICINE

## 2021-05-07 NOTE — PATIENT INSTRUCTIONS
Allergies: Care Instructions Your Care Instructions Allergies occur when your body's defense system (immune system) overreacts to certain substances. The immune system treats a harmless substance as if it were a harmful germ or virus. Many things can make this happen. These include pollens, medicine, food, dust, animal dander, and mold. Allergies can be mild or severe. Mild allergies can be managed with home treatment. But medicine may be needed to prevent problems. Managing your allergies is an important part of staying healthy. Your doctor may suggest that you have allergy testing to help find out what is causing your allergies. Severe allergies can cause reactions that affect your whole body (anaphylactic reactions). Your doctor may prescribe a shot of epinephrine to carry with you in case you have a severe reaction. Learn how to give yourself the shot and keep it with you at all times. Make sure it is not . Follow-up care is a key part of your treatment and safety. Be sure to make and go to all appointments, and call your doctor if you are having problems. It's also a good idea to know your test results and keep a list of the medicines you take. How can you care for yourself at home? · If you have been told by your doctor that dust or dust mites are causing your allergy, decrease the dust around your bed: 
? Wash sheets, pillowcases, and other bedding in hot water every week. ? Use dust-proof covers for pillows, duvets, and mattresses. Avoid plastic covers because they tear easily and do not \"breathe. \" Wash as instructed on the label. ? Do not use any blankets and pillows that you do not need. ? Use blankets that you can wash in your washing machine. ? Consider removing drapes and carpets, which attract and hold dust, from your bedroom. · If you are allergic to house dust and mites, do not use home humidifiers. Your doctor can suggest ways you can control dust and mites.  
· Look for signs of cockroaches. Cockroaches cause allergic reactions. Use cockroach baits to get rid of them. Then, clean your home well. Cockroaches like areas where grocery bags, newspapers, empty bottles, or cardboard boxes are stored. Do not keep these inside your home, and keep trash and food containers sealed. Seal off any spots where cockroaches might enter your home. · If you are allergic to mold, get rid of furniture, rugs, and drapes that smell musty. Check for mold in the bathroom. · If you are allergic to outdoor pollen or mold spores, use air-conditioning. Change or clean all filters every month. Keep windows closed. · If you are allergic to pollen, stay inside when pollen counts are high. Use a vacuum  with a HEPA filter or a double-thickness filter at least two times each week. · Stay inside when air pollution is bad. Avoid paint fumes, perfumes, and other strong odors. · Avoid conditions that make your allergies worse. Stay away from smoke. Do not smoke or let anyone else smoke in your house. Do not use fireplaces or wood-burning stoves. · If you are allergic to your pets, change the air filter in your furnace every month. Use high-efficiency filters. · If you are allergic to pet dander, keep pets outside or out of your bedroom. Old carpet and cloth furniture can hold a lot of animal dander. You may need to replace them. When should you call for help? Give an epinephrine shot if: 
  · You think you are having a severe allergic reaction.  
  · You have symptoms in more than one body area, such as mild nausea and an itchy mouth. After giving an epinephrine shot call 911, even if you feel better. Call 911 if: 
  · You have symptoms of a severe allergic reaction. These may include: 
? Sudden raised, red areas (hives) all over your body. ? Swelling of the throat, mouth, lips, or tongue. ? Trouble breathing. ? Passing out (losing consciousness).  Or you may feel very lightheaded or suddenly feel weak, confused, or restless.  
  · You have been given an epinephrine shot, even if you feel better. Call your doctor now or seek immediate medical care if: 
  · You have symptoms of an allergic reaction, such as: ? A rash or hives (raised, red areas on the skin). ? Itching. ? Swelling. ? Belly pain, nausea, or vomiting. Watch closely for changes in your health, and be sure to contact your doctor if: 
  · You do not get better as expected. Where can you learn more? Go to http://www.gray.com/ Enter E928 in the search box to learn more about \"Allergies: Care Instructions. \" Current as of: November 6, 2020               Content Version: 12.8 © 2006-2021 [employer]. Care instructions adapted under license by [employer] (which disclaims liability or warranty for this information). If you have questions about a medical condition or this instruction, always ask your healthcare professional. [place] [address] any warranty or liability for your use of this information.

## 2021-05-07 NOTE — PROGRESS NOTES
HIPAA verified by two patient identifiers. Nitin aWlton is a 63 y.o. male    Chief Complaint   Patient presents with    Annual Exam     3 mon f/u       Visit Vitals  /86 (BP 1 Location: Left upper arm, BP Patient Position: Sitting, BP Cuff Size: Large adult)   Pulse 73   Temp 98.1 °F (36.7 °C) (Oral)   Resp 16   Ht 5' 10\" (1.778 m)   Wt 217 lb 9.6 oz (98.7 kg)   SpO2 96%   BMI 31.22 kg/m²       Pain Scale: 0 - No pain/10  Pain Location:       Health Maintenance Due   Topic Date Due    COVID-19 Vaccine (1) Never done    DTaP/Tdap/Td series (1 - Tdap) Never done    Shingrix Vaccine Age 50> (1 of 2) Never done         Coordination of Care Questionnaire:  :   1) Have you been to an emergency room, urgent care, or hospitalized since your last visit? If yes, where when, and reason for visit? no       2. Have seen or consulted any other health care provider since your last visit? If yes, where when, and reason for visit? NO      Patient is accompanied by self I have received verbal consent from Nitin Walton to discuss any/all medical information while they are present in the room.

## 2021-06-05 PROBLEM — Z00.00 ANNUAL PHYSICAL EXAM: Status: RESOLVED | Noted: 2018-05-03 | Resolved: 2021-06-05

## 2021-07-01 RX ORDER — FLUTICASONE PROPIONATE 50 MCG
SPRAY, SUSPENSION (ML) NASAL
Qty: 3 BOTTLE | Refills: 2 | Status: SHIPPED | OUTPATIENT
Start: 2021-07-01 | End: 2022-08-12

## 2021-07-01 NOTE — TELEPHONE ENCOUNTER
PCP: Tiny Mendoza MD    Last appt: 7/31/2020  Future Appointments   Date Time Provider Nathan Patel   9/2/2021  8:00 AM Tiny Mendoza MD [facility]       Last refilled:4/14/20    Requested Prescriptions     Pending Prescriptions Disp Refills    fluticasone propionate (FLONASE) 50 mcg/actuation nasal spray [Pharmacy Med Name: FLUTICASONE 50MCG NASAL SP (120) RX] 48 g      Sig: SHAKE LIQUID AND USE 2 SPRAYS IN EACH NOSTRIL AT BEDTIME

## 2021-09-01 NOTE — PROGRESS NOTES
Chief Complaint   Patient presents with    Hypertension     3 month f/up    Thyroid Problem       SUBJECTIVE:    Wellington Granados is a 63 y.o. male of his medical problems include hypertension, hypothyroidism, allergic rhinitis, rheumatoid arthritis, obesity, DJD, dyslipidemia and other medical problems. He is taking his medications and trying to follow his diet and remains physically active. He just went on a recent rafting trip to the [facility] and enjoyed that. He currently denies any chest pain, shortness of breath, palpitations, PND, orthopnea or other cardiac or respiratory complaints. He notes no GI or  complaints. He notes no headaches, dizziness or neurologic complaints. No current active arthritic complaints and he has no other complaints on complete review of systems. Current Outpatient Medications   Medication Sig Dispense Refill    fluticasone propionate (FLONASE) 50 mcg/actuation nasal spray SHAKE LIQUID AND USE 2 SPRAYS IN EACH NOSTRIL AT BEDTIME 3 Bottle 2    levothyroxine (SYNTHROID) 50 mcg tablet TAKE 1 TABLET BY MOUTH EVERY DAY. 90 Tab 2    lisinopriL (PRINIVIL, ZESTRIL) 20 mg tablet TAKE 1 TABLET BY MOUTH DAILY 90 Tab 2    cyclobenzaprine (FLEXERIL) 10 mg tablet Take 1 Tab by mouth three (3) times daily as needed for Muscle Spasm(s). 30 Tab 1    EnbreL SureClick 50 mg/mL (1 mL) injection       loratadine (CLARITIN) 10 mg tablet Take 1 Tab by mouth daily. 90 Tab 2    aspirin delayed-release 81 mg tablet Take  by mouth daily. Past Medical History:   Diagnosis Date    DDD (degenerative disc disease), lumbar     Elevated BP     1/20/17 on no meds at this time    Hypothyroid     MARYAN on CPAP     RA (rheumatoid arthritis) ([facility] [address]      History reviewed. No pertinent surgical history.   Allergies   Allergen Reactions    Sulfa (Sulfonamide Antibiotics) Nausea and Vomiting       REVIEW OF SYSTEMS:  General: negative for - chills or fever, or weight loss or gain  ENT: negative for - headaches, nasal congestion or tinnitus  Eyes: no blurred or visual changes  Neck: No stiffness or swollen nodes  Respiratory: negative for - cough, hemoptysis, shortness of breath or wheezing  Cardiovascular : negative for - chest pain, edema, palpitations or shortness of breath  Gastrointestinal: negative for - abdominal pain, blood in stools, heartburn or nausea/vomiting  Genito-Urinary: no dysuria, trouble voiding, or hematuria  Musculoskeletal: negative for - gait disturbance, joint pain, joint stiffness or joint swelling  Neurological: no TIA or stroke symptoms  Hematologic: no bruises, no bleeding  Lymphatic: no swollen glands  Integument: no lumps, mole changes, nail changes or rash  Endocrine:no malaise/lethargy poly uria or polydipsia or unexpected weight changes        Social History     Socioeconomic History    Marital status:      Spouse name: Not on file    Number of children: Not on file    Years of education: Not on file    Highest education level: Not on file   Tobacco Use    Smoking status: Never Smoker    Smokeless tobacco: Never Used   Substance and Sexual Activity    Alcohol use: No    Drug use: No     Social Determinants of Health     Financial Resource Strain:     Difficulty of Paying Living Expenses:    Food Insecurity:     Worried About Running Out of Food in the Last Year:     Ran Out of Food in the Last Year:    Transportation Needs:     Lack of Transportation (Medical):      Lack of Transportation (Non-Medical):    Physical Activity:     Days of Exercise per Week:     Minutes of Exercise per Session:    Stress:     Feeling of Stress :    Social Connections:     Frequency of Communication with Friends and Family:     Frequency of Social Gatherings with Friends and Family:     Attends Yazdanism Services:     Active Member of Clubs or Organizations:     Attends Club or Organization Meetings:     Marital Status:      Family History   Problem Relation Age of Onset    Hypertension Mother        OBJECTIVE:     Visit Vitals  /80 (BP 1 Location: Left upper arm, BP Patient Position: Sitting, BP Cuff Size: Adult)   Pulse 76   Temp 98.6 °F (37 °C)   Resp 16   Ht 5' 10\" (1.778 m)   Wt 222 lb 9.6 oz (101 kg)   SpO2 98%   BMI 31.94 kg/m²     CONSTITUTIONAL:   well nourished, appears age appropriate  EYES: sclera anicteric, PERRL, EOMI  ENMT:nares clear, moist mucous membranes, pharynx clear  NECK: supple. Thyroid normal, No JVD or bruits  RESPIRATORY: Chest: clear to ascultation and percussion, normal inspiratory effort  CARDIOVASCULAR: Heart: regular rate and rhythm no murmurs, rubs or gallops, PMI not displaced, No thrills, no peripheral edema  GASTROINTESTINAL: Abdomen: non distended, soft, non tender, bowel sounds normal  HEMATOLOGIC: no purpura, petechiae or bruising  LYMPHATIC: No lymph node enlargemant  MUSCULOSKELETAL: Extremities: no active synovitis, pulse 1+   INTEGUMENT: No unusual rashes or suspicious skin lesions noted. Nails appear normal.  PERIPHERAL VASCULAR: normal pulses femoral, PT and DP  NEUROLOGIC: non-focal exam, A & O X 3  PSYCHIATRIC:, appropriate affect     ASSESSMENT:   1. Essential hypertension    2. Dyslipidemia    3. Rheumatoid arthritis involving multiple sites with positive rheumatoid factor (HCC)    4. Class 1 obesity due to excess calories without serious comorbidity with body mass index (BMI) of 30.0 to 30.9 in adult    5. Acquired hypothyroidism      Impression  1. Hypertension this well controlled so continue current therapy reviewed with him. 2.  Dyslipidemia repeat status pending a prior lab review not make adjustments if necessary. 3.  Rheumatoid arthritis seems to be stable  4. Obesity weight has slipped up a little. We did discuss diet, exercise and weight reduction but were health benefit. 5   Hypothyroidism stable on last check  I will recheck him in 3 months or sooner should the be a problem.   I will call her lab results in interim. PLAN:  .  Orders Placed This Encounter    METABOLIC PANEL, COMPREHENSIVE    LIPID PANEL    CK         ATTENTION:   This medical record was transcribed using an electronic medical records system. Although proofread, it may and can contain electronic and spelling errors. Other human spelling and other errors may be present. Corrections may be executed at a later time. Please feel free to contact us for any clarifications as needed. Follow-up and Dispositions    · Return in about 3 months (around 12/2/2021). No results found for any visits on 09/02/21. Jumana Cabral MD    The patient verbalized understanding of the problems and plans as explained.

## 2021-09-02 ENCOUNTER — OFFICE VISIT (OUTPATIENT)
Dept: INTERNAL MEDICINE CLINIC | Age: 56
End: 2021-09-02
Payer: COMMERCIAL

## 2021-09-02 VITALS
RESPIRATION RATE: 16 BRPM | WEIGHT: 222.6 LBS | BODY MASS INDEX: 31.87 KG/M2 | HEART RATE: 76 BPM | TEMPERATURE: 98.6 F | SYSTOLIC BLOOD PRESSURE: 118 MMHG | OXYGEN SATURATION: 98 % | HEIGHT: 70 IN | DIASTOLIC BLOOD PRESSURE: 80 MMHG

## 2021-09-02 DIAGNOSIS — E66.09 CLASS 1 OBESITY DUE TO EXCESS CALORIES WITHOUT SERIOUS COMORBIDITY WITH BODY MASS INDEX (BMI) OF 30.0 TO 30.9 IN ADULT: ICD-10-CM

## 2021-09-02 DIAGNOSIS — I10 ESSENTIAL HYPERTENSION: Primary | ICD-10-CM

## 2021-09-02 DIAGNOSIS — E78.5 DYSLIPIDEMIA: ICD-10-CM

## 2021-09-02 DIAGNOSIS — E03.9 ACQUIRED HYPOTHYROIDISM: ICD-10-CM

## 2021-09-02 DIAGNOSIS — M05.79 RHEUMATOID ARTHRITIS INVOLVING MULTIPLE SITES WITH POSITIVE RHEUMATOID FACTOR (HCC): ICD-10-CM

## 2021-09-02 LAB
ALBUMIN SERPL-MCNC: 4.5 G/DL (ref 3.5–5)
ALBUMIN/GLOB SERPL: 1.3 {RATIO} (ref 1.1–2.2)
ALP SERPL-CCNC: 78 U/L (ref 45–117)
ALT SERPL-CCNC: 40 U/L (ref 12–78)
ANION GAP SERPL CALC-SCNC: 4 MMOL/L (ref 5–15)
AST SERPL-CCNC: 20 U/L (ref 15–37)
BILIRUB SERPL-MCNC: 0.5 MG/DL (ref 0.2–1)
BUN SERPL-MCNC: 24 MG/DL (ref 6–20)
BUN/CREAT SERPL: 19 (ref 12–20)
CALCIUM SERPL-MCNC: 9.4 MG/DL (ref 8.5–10.1)
CHLORIDE SERPL-SCNC: 104 MMOL/L (ref 97–108)
CHOLEST SERPL-MCNC: 198 MG/DL
CK SERPL-CCNC: 101 U/L (ref 39–308)
CO2 SERPL-SCNC: 28 MMOL/L (ref 21–32)
CREAT SERPL-MCNC: 1.27 MG/DL (ref 0.7–1.3)
GLOBULIN SER CALC-MCNC: 3.5 G/DL (ref 2–4)
GLUCOSE SERPL-MCNC: 91 MG/DL (ref 65–100)
HDLC SERPL-MCNC: 46 MG/DL
HDLC SERPL: 4.3 {RATIO} (ref 0–5)
LDLC SERPL CALC-MCNC: 124.8 MG/DL (ref 0–100)
POTASSIUM SERPL-SCNC: 5.4 MMOL/L (ref 3.5–5.1)
PROT SERPL-MCNC: 8 G/DL (ref 6.4–8.2)
SODIUM SERPL-SCNC: 136 MMOL/L (ref 136–145)
TRIGL SERPL-MCNC: 136 MG/DL (ref ?–150)
VLDLC SERPL CALC-MCNC: 27.2 MG/DL

## 2021-09-02 PROCEDURE — 99214 OFFICE O/P EST MOD 30 MIN: CPT | Performed by: INTERNAL MEDICINE

## 2021-09-02 NOTE — PROGRESS NOTES
Chief Complaint   Patient presents with    Hypertension     3 month f/up    Thyroid Problem     1. Have you been to the ER, urgent care clinic since your last visit? Hospitalized since your last visit? Was seen at [facility]. 2. Have you seen or consulted any other health care providers outside of the [address] [place] [address] since your last visit? Include any pap smears or colon screening.  No

## 2021-09-02 NOTE — PATIENT INSTRUCTIONS
Allergies: Care Instructions  Your Care Instructions     Allergies occur when your body's defense system (immune system) overreacts to certain substances. The immune system treats a harmless substance as if it were a harmful germ or virus. Many things can make this happen. These include pollens, medicine, food, dust, animal dander, and mold. Allergies can be mild or severe. Mild allergies can be managed with home treatment. But medicine may be needed to prevent problems. Managing your allergies is an important part of staying healthy. Your doctor may suggest that you have allergy testing to help find out what is causing your allergies. Severe allergies can cause reactions that affect your whole body (anaphylactic reactions). Your doctor may prescribe a shot of epinephrine to carry with you in case you have a severe reaction. Learn how to give yourself the shot and keep it with you at all times. Make sure it is not . Follow-up care is a key part of your treatment and safety. Be sure to make and go to all appointments, and call your doctor if you are having problems. It's also a good idea to know your test results and keep a list of the medicines you take. How can you care for yourself at home? · If you have been told by your doctor that dust or dust mites are causing your allergy, decrease the dust around your bed:  ? Wash sheets, pillowcases, and other bedding in hot water every week. ? Use dust-proof covers for pillows, duvets, and mattresses. Avoid plastic covers because they tear easily and do not \"breathe. \" Wash as instructed on the label. ? Do not use any blankets and pillows that you do not need. ? Use blankets that you can wash in your washing machine. ? Consider removing drapes and carpets, which attract and hold dust, from your bedroom. · If you are allergic to house dust and mites, do not use home humidifiers. Your doctor can suggest ways you can control dust and mites.   · Look for signs of cockroaches. Cockroaches cause allergic reactions. Use cockroach baits to get rid of them. Then, clean your home well. Cockroaches like areas where grocery bags, newspapers, empty bottles, or cardboard boxes are stored. Do not keep these inside your home, and keep trash and food containers sealed. Seal off any spots where cockroaches might enter your home. · If you are allergic to mold, get rid of furniture, rugs, and drapes that smell musty. Check for mold in the bathroom. · If you are allergic to outdoor pollen or mold spores, use air-conditioning. Change or clean all filters every month. Keep windows closed. · If you are allergic to pollen, stay inside when pollen counts are high. Use a vacuum  with a HEPA filter or a double-thickness filter at least two times each week. · Stay inside when air pollution is bad. Avoid paint fumes, perfumes, and other strong odors. · Avoid conditions that make your allergies worse. Stay away from smoke. Do not smoke or let anyone else smoke in your house. Do not use fireplaces or wood-burning stoves. · If you are allergic to your pets, change the air filter in your furnace every month. Use high-efficiency filters. · If you are allergic to pet dander, keep pets outside or out of your bedroom. Old carpet and cloth furniture can hold a lot of animal dander. You may need to replace them. When should you call for help? Give an epinephrine shot if:    · You think you are having a severe allergic reaction.     · You have symptoms in more than one body area, such as mild nausea and an itchy mouth. After giving an epinephrine shot call 911, even if you feel better. Call 911 if:    · You have symptoms of a severe allergic reaction. These may include:  ? Sudden raised, red areas (hives) all over your body. ? Swelling of the throat, mouth, lips, or tongue. ? Trouble breathing. ? Passing out (losing consciousness).  Or you may feel very lightheaded or suddenly feel weak, confused, or restless.     · You have been given an epinephrine shot, even if you feel better. Call your doctor now or seek immediate medical care if:    · You have symptoms of an allergic reaction, such as:  ? A rash or hives (raised, red areas on the skin). ? Itching. ? Swelling. ? Belly pain, nausea, or vomiting. Watch closely for changes in your health, and be sure to contact your doctor if:    · You do not get better as expected. Where can you learn more? Go to http://www.white.com/  Enter W171 in the search box to learn more about \"Allergies: Care Instructions. \"  Current as of: November 6, 2020               Content Version: 12.8  © 2006-2021 [employer]wise[employer]. Care instructions adapted under license by [employer] (which disclaims liability or warranty for this information). If you have questions about a medical condition or this instruction, always ask your healthcare professional. [place] [address] any warranty or liability for your use of this information.

## 2021-09-04 NOTE — PROGRESS NOTES
LDL is still a little up but improved and other numbers are better so continue same without change in medicines.

## 2021-09-07 NOTE — PROGRESS NOTES
LDL is still a little up but improved and other numbers are better so continue same without change in medicines. Letter generated to patient regarding.

## 2021-09-29 DIAGNOSIS — I10 ESSENTIAL HYPERTENSION: ICD-10-CM

## 2021-09-29 RX ORDER — LEVOTHYROXINE SODIUM 50 UG/1
TABLET ORAL
Qty: 90 TABLET | Refills: 3 | Status: SHIPPED | OUTPATIENT
Start: 2021-09-29 | End: 2022-10-13

## 2021-09-29 RX ORDER — LISINOPRIL 20 MG/1
TABLET ORAL
Qty: 90 TABLET | Refills: 3 | Status: SHIPPED | OUTPATIENT
Start: 2021-09-29 | End: 2022-10-13

## 2021-09-29 NOTE — TELEPHONE ENCOUNTER
RX refill request from the patient/pharmacy. Patient last seen 09- with labs, and next appt. scheduled for 12-  Requested Prescriptions     Pending Prescriptions Disp Refills    levothyroxine (SYNTHROID) 50 mcg tablet [Pharmacy Med Name: LEVOTHYROXINE 0.05MG (50MCG) TAB] 90 Tablet 3     Sig: TAKE 1 TABLET BY MOUTH EVERY DAY.  lisinopriL (PRINIVIL, ZESTRIL) 20 mg tablet [Pharmacy Med Name: LISINOPRIL 20MG TABLETS] 90 Tablet 3     Sig: TAKE 1 TABLET BY MOUTH DAILY   .

## 2021-12-03 ENCOUNTER — OFFICE VISIT (OUTPATIENT)
Dept: INTERNAL MEDICINE CLINIC | Age: 56
End: 2021-12-03
Payer: COMMERCIAL

## 2021-12-03 VITALS
DIASTOLIC BLOOD PRESSURE: 83 MMHG | HEIGHT: 70 IN | RESPIRATION RATE: 18 BRPM | SYSTOLIC BLOOD PRESSURE: 114 MMHG | TEMPERATURE: 98.7 F | BODY MASS INDEX: 33.73 KG/M2 | OXYGEN SATURATION: 97 % | HEART RATE: 77 BPM | WEIGHT: 235.6 LBS

## 2021-12-03 DIAGNOSIS — M05.79 RHEUMATOID ARTHRITIS INVOLVING MULTIPLE SITES WITH POSITIVE RHEUMATOID FACTOR (HCC): ICD-10-CM

## 2021-12-03 DIAGNOSIS — I10 ESSENTIAL HYPERTENSION: Primary | ICD-10-CM

## 2021-12-03 DIAGNOSIS — J30.89 NON-SEASONAL ALLERGIC RHINITIS, UNSPECIFIED TRIGGER: ICD-10-CM

## 2021-12-03 DIAGNOSIS — E66.09 CLASS 1 OBESITY DUE TO EXCESS CALORIES WITHOUT SERIOUS COMORBIDITY WITH BODY MASS INDEX (BMI) OF 30.0 TO 30.9 IN ADULT: ICD-10-CM

## 2021-12-03 DIAGNOSIS — E78.5 DYSLIPIDEMIA: ICD-10-CM

## 2021-12-03 LAB
ALBUMIN SERPL-MCNC: 4.7 G/DL (ref 3.5–5)
ALBUMIN/GLOB SERPL: 1.4 {RATIO} (ref 1.1–2.2)
ALP SERPL-CCNC: 75 U/L (ref 45–117)
ALT SERPL-CCNC: 43 U/L (ref 12–78)
ANION GAP SERPL CALC-SCNC: 6 MMOL/L (ref 5–15)
AST SERPL-CCNC: 19 U/L (ref 15–37)
BILIRUB SERPL-MCNC: 0.4 MG/DL (ref 0.2–1)
BUN SERPL-MCNC: 18 MG/DL (ref 6–20)
BUN/CREAT SERPL: 16 (ref 12–20)
CALCIUM SERPL-MCNC: 9.8 MG/DL (ref 8.5–10.1)
CHLORIDE SERPL-SCNC: 103 MMOL/L (ref 97–108)
CHOLEST SERPL-MCNC: 218 MG/DL
CK SERPL-CCNC: 130 U/L (ref 39–308)
CO2 SERPL-SCNC: 28 MMOL/L (ref 21–32)
CREAT SERPL-MCNC: 1.15 MG/DL (ref 0.7–1.3)
GLOBULIN SER CALC-MCNC: 3.3 G/DL (ref 2–4)
GLUCOSE SERPL-MCNC: 89 MG/DL (ref 65–100)
HDLC SERPL-MCNC: 49 MG/DL
HDLC SERPL: 4.4 {RATIO} (ref 0–5)
LDLC SERPL CALC-MCNC: 135.4 MG/DL (ref 0–100)
POTASSIUM SERPL-SCNC: 5.1 MMOL/L (ref 3.5–5.1)
PROT SERPL-MCNC: 8 G/DL (ref 6.4–8.2)
SODIUM SERPL-SCNC: 137 MMOL/L (ref 136–145)
TRIGL SERPL-MCNC: 168 MG/DL (ref ?–150)
VLDLC SERPL CALC-MCNC: 33.6 MG/DL

## 2021-12-03 PROCEDURE — 99214 OFFICE O/P EST MOD 30 MIN: CPT | Performed by: INTERNAL MEDICINE

## 2021-12-03 NOTE — PATIENT INSTRUCTIONS
Allergies: Care Instructions  Overview     Allergies occur when your body's defense system (immune system) overreacts to certain substances. The immune system treats a harmless substance as if it were a harmful germ or virus. Many things can make this happen. These include pollens, medicine, food, dust, animal dander, and mold. Allergies can be mild or severe. Mild allergies can be managed with home treatment. But medicine may be needed to prevent problems. Managing your allergies is an important part of staying healthy. Your doctor may suggest that you have allergy testing to help find out what is causing your allergies. Severe allergies can cause reactions that affect your whole body (anaphylactic reactions). Your doctor may prescribe a shot of epinephrine to carry with you in case you have a severe reaction. Learn how to give yourself the shot and keep it with you at all times. Make sure it is not . Follow-up care is a key part of your treatment and safety. Be sure to make and go to all appointments, and call your doctor if you are having problems. It's also a good idea to know your test results and keep a list of the medicines you take. How can you care for yourself at home? · If you have been told by your doctor that dust or dust mites are causing your allergy, decrease the dust around your bed:  ? Wash sheets, pillowcases, and other bedding in hot water every week. ? Use dust-proof covers for pillows, duvets, and mattresses. Avoid plastic covers because they tear easily and do not \"breathe. \" Wash as instructed on the label. ? Do not use any blankets and pillows that you do not need. ? Use blankets that you can wash in your washing machine. ? Consider removing drapes and carpets, which attract and hold dust, from your bedroom. · If you are allergic to house dust and mites, do not use home humidifiers. Your doctor can suggest ways you can control dust and mites.   · Look for signs of cockroaches. Cockroaches cause allergic reactions. Use cockroach baits to get rid of them. Then, clean your home well. Cockroaches like areas where grocery bags, newspapers, empty bottles, or cardboard boxes are stored. Do not keep these inside your home, and keep trash and food containers sealed. Seal off any spots where cockroaches might enter your home. · If you are allergic to mold, get rid of furniture, rugs, and drapes that smell musty. Check for mold in the bathroom. · If you are allergic to outdoor pollen or mold spores, use air-conditioning. Change or clean all filters every month. Keep windows closed. · If you are allergic to pollen, stay inside when pollen counts are high. Use a vacuum  with a HEPA filter or a double-thickness filter at least two times each week. · Stay inside when air pollution is bad. Avoid paint fumes, perfumes, and other strong odors. · Avoid conditions that make your allergies worse. Stay away from smoke. Do not smoke or let anyone else smoke in your house. Do not use fireplaces or wood-burning stoves. · If you are allergic to your pets, change the air filter in your furnace every month. Use high-efficiency filters. · If you are allergic to pet dander, keep pets outside or out of your bedroom. Old carpet and cloth furniture can hold a lot of animal dander. You may need to replace them. When should you call for help? Give an epinephrine shot if:    · You think you are having a severe allergic reaction.     · You have symptoms in more than one body area, such as mild nausea and an itchy mouth. After giving an epinephrine shot call 911, even if you feel better. Call 911 if:    · You have symptoms of a severe allergic reaction. These may include:  ? Sudden raised, red areas (hives) all over your body. ? Swelling of the throat, mouth, lips, or tongue. ? Trouble breathing. ? Passing out (losing consciousness).  Or you may feel very lightheaded or suddenly feel weak, confused, or restless.     · You have been given an epinephrine shot, even if you feel better. Call your doctor now or seek immediate medical care if:    · You have symptoms of an allergic reaction, such as:  ? A rash or hives (raised, red areas on the skin). ? Itching. ? Swelling. ? Belly pain, nausea, or vomiting. Watch closely for changes in your health, and be sure to contact your doctor if:    · You do not get better as expected. Where can you learn more? Go to http://www.white.com/  Enter W171 in the search box to learn more about \"Allergies: Care Instructions. \"  Current as of: February 10, 2021               Content Version: 13.0  © 2006-2021 Healthwise, Inc[employer]. Care instructions adapted under license by [employer] (which disclaims liability or warranty for this information). If you have questions about a medical condition or this instruction, always ask your healthcare professional. [place] [address] any warranty or liability for your use of this information.

## 2021-12-03 NOTE — PROGRESS NOTES
Rm  E3    Chief Complaint   Patient presents with    Medication Evaluation     3 mo f/u        Visit Vitals  /83 (BP 1 Location: Right arm, BP Patient Position: Sitting, BP Cuff Size: Large adult)   Pulse 77   Temp 98.7 °F (37.1 °C) (Oral)   Resp 18   Ht 5' 10\" (1.778 m)   Wt 235 lb 9.6 oz (106.9 kg)   SpO2 97%   BMI 33.81 kg/m²        1. Have you been to the ER, urgent care clinic since your last visit? Hospitalized since your last visit? No    2. Have you seen or consulted any other health care providers outside of the [address] [place] [address] since your last visit? Include any pap smears or colon screening. No     Health Maintenance Due   Topic Date Due    DTaP/Tdap/Td series (1 - Tdap) Never done    Shingrix Vaccine Age 50> (1 of 2) Never done    Flu Vaccine (1) 09/01/2021        3 most recent PHQ Screens 12/3/2021   Little interest or pleasure in doing things Not at all   Feeling down, depressed, irritable, or hopeless Not at all   Total Score PHQ 2 0        No flowsheet data found.     Learning Assessment 5/3/2018   PRIMARY LEARNER Patient   HIGHEST LEVEL OF EDUCATION - PRIMARY LEARNER  GRADUATED HIGH SCHOOL OR GED   BARRIERS PRIMARY LEARNER NONE   CO-LEARNER CAREGIVER No   CO-LEARNER NAME no   PRIMARY LANGUAGE ENGLISH   LEARNER PREFERENCE PRIMARY DEMONSTRATION   ANSWERED BY self   RELATIONSHIP SELF

## 2021-12-03 NOTE — PROGRESS NOTES
Chief Complaint   Patient presents with    Medication Evaluation     3 mo f/u       SUBJECTIVE:    Ajay Sauceda is a 61 y.o. male turns in follow-up for his medical problems include hypertension, hyperlipidemia, hypothyroidism, allergic rhinitis, obesity, rheumatoid arthritis and other multiple medical problems. He is taking his medications and trying to follow his diet but he did have to have surgery on his arm unfortunately got a little off of his diet. He currently denies any chest pain, shortness of breath, palpitations, PND, orthopnea or other cardiac or respiratory complaints. He notes no current GI or  complaints. He notes no headaches, dizziness or neurologic complaints. No current active arthritic complaints and no other complaints on complete view of systems. Current Outpatient Medications   Medication Sig Dispense Refill    levothyroxine (SYNTHROID) 50 mcg tablet TAKE 1 TABLET BY MOUTH EVERY DAY. 90 Tablet 3    lisinopriL (PRINIVIL, ZESTRIL) 20 mg tablet TAKE 1 TABLET BY MOUTH DAILY 90 Tablet 3    fluticasone propionate (FLONASE) 50 mcg/actuation nasal spray SHAKE LIQUID AND USE 2 SPRAYS IN EACH NOSTRIL AT BEDTIME 3 Bottle 2    cyclobenzaprine (FLEXERIL) 10 mg tablet Take 1 Tab by mouth three (3) times daily as needed for Muscle Spasm(s). 30 Tab 1    EnbreL SureClick 50 mg/mL (1 mL) injection       loratadine (CLARITIN) 10 mg tablet Take 1 Tab by mouth daily. 90 Tab 2    aspirin delayed-release 81 mg tablet Take  by mouth daily. Past Medical History:   Diagnosis Date    DDD (degenerative disc disease), lumbar     Elevated BP     1/20/17 on no meds at this time    Hypothyroid     MARYAN on CPAP     RA (rheumatoid arthritis) ([facility] [address]      History reviewed. No pertinent surgical history.   Allergies   Allergen Reactions    Sulfa (Sulfonamide Antibiotics) Nausea and Vomiting       REVIEW OF SYSTEMS:  General: negative for - chills or fever, or weight loss or gain  ENT: negative for - headaches, nasal congestion or tinnitus  Eyes: no blurred or visual changes  Neck: No stiffness or swollen nodes  Respiratory: negative for - cough, hemoptysis, shortness of breath or wheezing  Cardiovascular : negative for - chest pain, edema, palpitations or shortness of breath  Gastrointestinal: negative for - abdominal pain, blood in stools, heartburn or nausea/vomiting  Genito-Urinary: no dysuria, trouble voiding, or hematuria  Musculoskeletal: negative for - gait disturbance, joint pain, joint stiffness or joint swelling  Neurological: no TIA or stroke symptoms  Hematologic: no bruises, no bleeding  Lymphatic: no swollen glands  Integument: no lumps, mole changes, nail changes or rash  Endocrine:no malaise/lethargy poly uria or polydipsia or unexpected weight changes        Social History     Socioeconomic History    Marital status:    Tobacco Use    Smoking status: Never Smoker    Smokeless tobacco: Never Used   Substance and Sexual Activity    Alcohol use: No    Drug use: No     Family History   Problem Relation Age of Onset    Hypertension Mother        OBJECTIVE:     Visit Vitals  /83 (BP 1 Location: Right arm, BP Patient Position: Sitting, BP Cuff Size: Large adult)   Pulse 77   Temp 98.7 °F (37.1 °C) (Oral)   Resp 18   Ht 5' 10\" (1.778 m)   Wt 235 lb 9.6 oz (106.9 kg)   SpO2 97%   BMI 33.81 kg/m²     CONSTITUTIONAL:   well nourished, appears age appropriate  EYES: sclera anicteric, PERRL, EOMI  ENMT:nares clear, moist mucous membranes, pharynx clear  NECK: supple.  Thyroid normal, No JVD or bruits  RESPIRATORY: Chest: clear to ascultation and percussion, normal inspiratory effort  CARDIOVASCULAR: Heart: regular rate and rhythm no murmurs, rubs or gallops, PMI not displaced, No thrills, no peripheral edema  GASTROINTESTINAL: Abdomen: non distended, soft, non tender, bowel sounds normal  HEMATOLOGIC: no purpura, petechiae or bruising  LYMPHATIC: No lymph node enlargemant  MUSCULOSKELETAL: Extremities: no active synovitis, pulse 1+   INTEGUMENT: No unusual rashes or suspicious skin lesions noted. Nails appear normal.  PERIPHERAL VASCULAR: normal pulses femoral, PT and DP  NEUROLOGIC: non-focal exam, A & O X 3  PSYCHIATRIC:, appropriate affect     ASSESSMENT:   1. Essential hypertension    2. Dyslipidemia    3. Rheumatoid arthritis involving multiple sites with positive rheumatoid factor (HCC)    4. Class 1 obesity due to excess calories without serious comorbidity with body mass index (BMI) of 30.0 to 30.9 in adult    5. Non-seasonal allergic rhinitis, unspecified trigger      Impression  1. Hypertension that is controlled so continue current therapy reviewed with him. 2.  Dyslipidemia repeat status pending a prior lab review not make adjustments if necessary. 3.  Rheumatoid arthritis stable   4. Obesity unfortunately weight is up and we did discuss diet, exercise and weight reduction for overall health benefit. 5.  Allergic rhinitis that is stable  I will recheck him again myself in 3 months or sooner should the be a problem. I will call with lab results in interim. PLAN:  .  Orders Placed This Encounter    METABOLIC PANEL, COMPREHENSIVE    LIPID PANEL    CK         ATTENTION:   This medical record was transcribed using an electronic medical records system. Although proofread, it may and can contain electronic and spelling errors. Other human spelling and other errors may be present. Corrections may be executed at a later time. Please feel free to contact us for any clarifications as needed. Follow-up and Dispositions    · Return in about 3 months (around 3/3/2022). No results found for any visits on 12/03/21. Safia Dinh MD    The patient verbalized understanding of the problems and plans as explained.

## 2022-02-14 RX ORDER — CYCLOBENZAPRINE HCL 10 MG
TABLET ORAL
Qty: 30 TABLET | Refills: 1 | Status: SHIPPED | OUTPATIENT
Start: 2022-02-14

## 2022-02-21 NOTE — PROGRESS NOTES
HIPAA verified by two patient identifiers. Chaim Frank is a 63 y.o. male    Chief Complaint   Patient presents with    Hypertension     6 month f/u       Visit Vitals  /86 (BP 1 Location: Left arm, BP Patient Position: Sitting)   Pulse 74   Temp 98.8 °F (37.1 °C) (Oral)   Resp 18   Ht 5' 10\" (1.778 m)   Wt 233 lb 9.6 oz (106 kg)   SpO2 98%   BMI 33.52 kg/m²       Pain Scale: 0 - No pain/10  Pain Location:       Health Maintenance Due   Topic Date Due    DTaP/Tdap/Td series (1 - Tdap) 09/19/1985    Shingrix Vaccine Age 50> (1 of 2) 09/19/2014    Flu Vaccine (1) 09/01/2020         Coordination of Care Questionnaire:  :   1) Have you been to an emergency room, urgent care, or hospitalized since your last visit? If yes, where when, and reason for visit? no       2. Have seen or consulted any other health care provider since your last visit? If yes, where when, and reason for visit? NO      Patient is accompanied by self I have received verbal consent from Chaim Frank to discuss any/all medical information while they are present in the room. Impression: Keratoconjunctivitis sicca of right eye: H16.221. Plan: Patient has a history of dry eye. Diagnosis discussed with patient in great detail. Recommend patient use artificial tears 4 times a day and perform warm compresses. Will start patient on Pred Forte TID OD, erx'd to patient's pharmacy. Patient to use Pred until bottle is done. 

*Sample of Lumigan given to patient to get him by prior to picking up refills from pharmacy*

## 2022-03-09 NOTE — PROGRESS NOTES
Chief Complaint   Patient presents with    Thyroid Problem     3 month    Hypertension    Arthritis    Cholesterol Problem       SUBJECTIVE:    Terry Turcios is a 61 y.o. male turns in follow-up of his medical problems include hypertension, hyperlipidemia, DJD, hypothyroidism, obesity and other medical problems. He is taking his medications and trying to follow his diet and remains physically active. He currently denies any chest pain, shortness of breath, palpitations, PND, orthopnea or other cardiac or respiratory complaints. He notes no current GI or  complaints. He has no headaches, dizziness or neurologic complaints. No current active arthritic complaints and no other complaints on complete review of systems. Current Outpatient Medications   Medication Sig Dispense Refill    cyclobenzaprine (FLEXERIL) 10 mg tablet TAKE 1 TABLET BY MOUTH THREE TIMES DAILY AS NEEDED FOR MUSCLE SPASMS 30 Tablet 1    levothyroxine (SYNTHROID) 50 mcg tablet TAKE 1 TABLET BY MOUTH EVERY DAY. 90 Tablet 3    lisinopriL (PRINIVIL, ZESTRIL) 20 mg tablet TAKE 1 TABLET BY MOUTH DAILY 90 Tablet 3    fluticasone propionate (FLONASE) 50 mcg/actuation nasal spray SHAKE LIQUID AND USE 2 SPRAYS IN EACH NOSTRIL AT BEDTIME 3 Bottle 2    EnbreL SureClick 50 mg/mL (1 mL) injection       loratadine (CLARITIN) 10 mg tablet Take 1 Tab by mouth daily. 90 Tab 2    aspirin delayed-release 81 mg tablet Take  by mouth daily. Past Medical History:   Diagnosis Date    DDD (degenerative disc disease), lumbar     Elevated BP     1/20/17 on no meds at this time    Hypothyroid     MARYAN on CPAP     RA (rheumatoid arthritis) ([facility] [address]      History reviewed. No pertinent surgical history.   Allergies   Allergen Reactions    Sulfa (Sulfonamide Antibiotics) Nausea and Vomiting       REVIEW OF SYSTEMS:  General: negative for - chills or fever, or weight loss or gain  ENT: negative for - headaches, nasal congestion or tinnitus  Eyes: no blurred or visual changes  Neck: No stiffness or swollen nodes  Respiratory: negative for - cough, hemoptysis, shortness of breath or wheezing  Cardiovascular : negative for - chest pain, edema, palpitations or shortness of breath  Gastrointestinal: negative for - abdominal pain, blood in stools, heartburn or nausea/vomiting  Genito-Urinary: no dysuria, trouble voiding, or hematuria  Musculoskeletal: negative for - gait disturbance, joint pain, joint stiffness or joint swelling  Neurological: no TIA or stroke symptoms  Hematologic: no bruises, no bleeding  Lymphatic: no swollen glands  Integument: no lumps, mole changes, nail changes or rash  Endocrine:no malaise/lethargy poly uria or polydipsia or unexpected weight changes        Social History     Socioeconomic History    Marital status:    Tobacco Use    Smoking status: Never Smoker    Smokeless tobacco: Never Used   Substance and Sexual Activity    Alcohol use: No    Drug use: No     Family History   Problem Relation Age of Onset    Hypertension Mother        OBJECTIVE:     Visit Vitals  /86 (BP 1 Location: Left upper arm, BP Patient Position: Sitting, BP Cuff Size: Adult)   Pulse 76   Temp 98.3 °F (36.8 °C) (Oral)   Resp 18   Ht 5' 10\" (1.778 m)   Wt 239 lb 3.2 oz (108.5 kg)   SpO2 97%   BMI 34.32 kg/m²     CONSTITUTIONAL:   well nourished, appears age appropriate  EYES: sclera anicteric, PERRL, EOMI  ENMT:nares clear, moist mucous membranes, pharynx clear  NECK: supple.  Thyroid normal, No JVD or bruits  RESPIRATORY: Chest: clear to ascultation and percussion, normal inspiratory effort  CARDIOVASCULAR: Heart: regular rate and rhythm no murmurs, rubs or gallops, PMI not displaced, No thrills, no peripheral edema  GASTROINTESTINAL: Abdomen: non distended, soft, non tender, bowel sounds normal  HEMATOLOGIC: no purpura, petechiae or bruising  LYMPHATIC: No lymph node enlargemant  MUSCULOSKELETAL: Extremities: no active synovitis, pulse 1+ INTEGUMENT: No unusual rashes or suspicious skin lesions noted. Nails appear normal.  PERIPHERAL VASCULAR: normal pulses femoral, PT and DP  NEUROLOGIC: non-focal exam, A & O X 3  PSYCHIATRIC:, appropriate affect     ASSESSMENT:   1. Essential hypertension    2. Dyslipidemia    3. Rheumatoid arthritis involving multiple sites with positive rheumatoid factor (HCC)    4. Class 1 obesity due to excess calories without serious comorbidity with body mass index (BMI) of 30.0 to 30.9 in adult    5. Acquired hypothyroidism      Impression  1. Hypertension that is controlled so continue current therapy reviewed with him. 2.  Dyslipidemia prior lab reviewed repeat status pending I will adjust if needed. 3 rheumatoid arthritis that is stable   4. Obesity I did discuss diet, exercise and weight reduction for overall health benefit. 5.  Hypothyroidism stable last check  I will recheck him again in 3 months or sooner if there is a problem. PLAN:  .  Orders Placed This Encounter    METABOLIC PANEL, COMPREHENSIVE    LIPID PANEL    CK         ATTENTION:   This medical record was transcribed using an electronic medical records system. Although proofread, it may and can contain electronic and spelling errors. Other human spelling and other errors may be present. Corrections may be executed at a later time. Please feel free to contact us for any clarifications as needed. Follow-up and Dispositions    · Return in about 3 months (around 6/10/2022). No results found for any visits on 03/10/22. Enma Jamil MD    The patient verbalized understanding of the problems and plans as explained.

## 2022-03-10 ENCOUNTER — OFFICE VISIT (OUTPATIENT)
Dept: INTERNAL MEDICINE CLINIC | Age: 57
End: 2022-03-10
Payer: COMMERCIAL

## 2022-03-10 VITALS
HEART RATE: 76 BPM | BODY MASS INDEX: 34.24 KG/M2 | OXYGEN SATURATION: 97 % | SYSTOLIC BLOOD PRESSURE: 130 MMHG | WEIGHT: 239.2 LBS | DIASTOLIC BLOOD PRESSURE: 86 MMHG | TEMPERATURE: 98.3 F | HEIGHT: 70 IN | RESPIRATION RATE: 18 BRPM

## 2022-03-10 DIAGNOSIS — E03.9 ACQUIRED HYPOTHYROIDISM: ICD-10-CM

## 2022-03-10 DIAGNOSIS — E66.09 CLASS 1 OBESITY DUE TO EXCESS CALORIES WITHOUT SERIOUS COMORBIDITY WITH BODY MASS INDEX (BMI) OF 30.0 TO 30.9 IN ADULT: ICD-10-CM

## 2022-03-10 DIAGNOSIS — M05.79 RHEUMATOID ARTHRITIS INVOLVING MULTIPLE SITES WITH POSITIVE RHEUMATOID FACTOR (HCC): ICD-10-CM

## 2022-03-10 DIAGNOSIS — I10 ESSENTIAL HYPERTENSION: Primary | ICD-10-CM

## 2022-03-10 DIAGNOSIS — E78.5 DYSLIPIDEMIA: ICD-10-CM

## 2022-03-10 PROCEDURE — 99214 OFFICE O/P EST MOD 30 MIN: CPT | Performed by: INTERNAL MEDICINE

## 2022-03-10 NOTE — PATIENT INSTRUCTIONS
Allergies: Care Instructions  Overview     Allergies occur when your body's defense system (immune system) overreacts to certain substances. The immune system treats a harmless substance as if it were a harmful germ or virus. Many things can make this happen. These include pollens, medicine, food, dust, animal dander, and mold. Allergies can be mild or severe. Mild allergies can be managed with home treatment. But medicine may be needed to prevent problems. Managing your allergies is an important part of staying healthy. Your doctor may suggest that you have allergy testing to help find out what is causing your allergies. Severe allergies can cause reactions that affect your whole body (anaphylactic reactions). Your doctor may prescribe a shot of epinephrine to carry with you in case you have a severe reaction. Learn how to give yourself the shot and keep it with you at all times. Make sure it is not . Follow-up care is a key part of your treatment and safety. Be sure to make and go to all appointments, and call your doctor if you are having problems. It's also a good idea to know your test results and keep a list of the medicines you take. How can you care for yourself at home? · If you have been told by your doctor that dust or dust mites are causing your allergy, decrease the dust around your bed:  ? Wash sheets, pillowcases, and other bedding in hot water every week. ? Use dust-proof covers for pillows, duvets, and mattresses. Avoid plastic covers because they tear easily and do not \"breathe. \" Wash as instructed on the label. ? Do not use any blankets and pillows that you do not need. ? Use blankets that you can wash in your washing machine. ? Consider removing drapes and carpets, which attract and hold dust, from your bedroom. · If you are allergic to house dust and mites, do not use home humidifiers. Your doctor can suggest ways you can control dust and mites.   · Look for signs of cockroaches. Cockroaches cause allergic reactions. Use cockroach baits to get rid of them. Then, clean your home well. Cockroaches like areas where grocery bags, newspapers, empty bottles, or cardboard boxes are stored. Do not keep these inside your home, and keep trash and food containers sealed. Seal off any spots where cockroaches might enter your home. · If you are allergic to mold, get rid of furniture, rugs, and drapes that smell musty. Check for mold in the bathroom. · If you are allergic to outdoor pollen or mold spores, use air-conditioning. Change or clean all filters every month. Keep windows closed. · If you are allergic to pollen, stay inside when pollen counts are high. Use a vacuum  with a HEPA filter or a double-thickness filter at least two times each week. · Stay inside when air pollution is bad. Avoid paint fumes, perfumes, and other strong odors. · Avoid conditions that make your allergies worse. Stay away from smoke. Do not smoke or let anyone else smoke in your house. Do not use fireplaces or wood-burning stoves. · If you are allergic to your pets, change the air filter in your furnace every month. Use high-efficiency filters. · If you are allergic to pet dander, keep pets outside or out of your bedroom. Old carpet and cloth furniture can hold a lot of animal dander. You may need to replace them. When should you call for help? Give an epinephrine shot if:    · You think you are having a severe allergic reaction.     · You have symptoms in more than one body area, such as mild nausea and an itchy mouth. After giving an epinephrine shot call 911, even if you feel better. Call 911 if:    · You have symptoms of a severe allergic reaction. These may include:  ? Sudden raised, red areas (hives) all over your body. ? Swelling of the throat, mouth, lips, or tongue. ? Trouble breathing. ? Passing out (losing consciousness).  Or you may feel very lightheaded or suddenly feel weak, confused, or restless.     · You have been given an epinephrine shot, even if you feel better. Call your doctor now or seek immediate medical care if:    · You have symptoms of an allergic reaction, such as:  ? A rash or hives (raised, red areas on the skin). ? Itching. ? Swelling. ? Belly pain, nausea, or vomiting. Watch closely for changes in your health, and be sure to contact your doctor if:    · You do not get better as expected. Where can you learn more? Go to http://www.white.com/  Enter W171 in the search box to learn more about \"Allergies: Care Instructions. \"  Current as of: February 10, 2021               Content Version: 13.2  © 2006-2022 [employer]. Care instructions adapted under license by [employer] (which disclaims liability or warranty for this information). If you have questions about a medical condition or this instruction, always ask your healthcare professional. [place] [address] any warranty or liability for your use of this information.

## 2022-03-10 NOTE — PROGRESS NOTES
HIPAA verified by two patient identifiers. Terry Turcios is a 61 y.o. male    Chief Complaint   Patient presents with    Thyroid Problem     3 month    Hypertension    Arthritis    Cholesterol Problem       Visit Vitals  /86 (BP 1 Location: Left upper arm, BP Patient Position: Sitting, BP Cuff Size: Adult)   Pulse 76   Temp 98.3 °F (36.8 °C) (Oral)   Resp 18   Ht 5' 10\" (1.778 m)   Wt 239 lb 3.2 oz (108.5 kg)   SpO2 97%   BMI 34.32 kg/m²       Pain Scale: 0 - No pain/10  Pain Location:       Health Maintenance Due   Topic Date Due    DTaP/Tdap/Td series (1 - Tdap) Never done    Shingrix Vaccine Age 50> (1 of 2) Never done    Flu Vaccine (1) 09/01/2021         Coordination of Care Questionnaire:  :   1) Have you been to an emergency room, urgent care, or hospitalized since your last visit? If yes, where when, and reason for visit? no       2. Have seen or consulted any other health care provider since your last visit? If yes, where when, and reason for visit? NO      Patient is accompanied by self I have received verbal consent from Terry Turcios to discuss any/all medical information while they are present in the room.

## 2022-03-11 LAB
ALBUMIN SERPL-MCNC: 4.5 G/DL (ref 3.5–5)
ALBUMIN/GLOB SERPL: 1.4 {RATIO} (ref 1.1–2.2)
ALP SERPL-CCNC: 68 U/L (ref 45–117)
ALT SERPL-CCNC: 46 U/L (ref 12–78)
ANION GAP SERPL CALC-SCNC: 4 MMOL/L (ref 5–15)
AST SERPL-CCNC: 21 U/L (ref 15–37)
BILIRUB SERPL-MCNC: 0.3 MG/DL (ref 0.2–1)
BUN SERPL-MCNC: 26 MG/DL (ref 6–20)
BUN/CREAT SERPL: 22 (ref 12–20)
CALCIUM SERPL-MCNC: 9.4 MG/DL (ref 8.5–10.1)
CHLORIDE SERPL-SCNC: 105 MMOL/L (ref 97–108)
CHOLEST SERPL-MCNC: 184 MG/DL
CK SERPL-CCNC: 159 U/L (ref 39–308)
CO2 SERPL-SCNC: 27 MMOL/L (ref 21–32)
CREAT SERPL-MCNC: 1.2 MG/DL (ref 0.7–1.3)
GLOBULIN SER CALC-MCNC: 3.3 G/DL (ref 2–4)
GLUCOSE SERPL-MCNC: 92 MG/DL (ref 65–100)
HDLC SERPL-MCNC: 39 MG/DL
HDLC SERPL: 4.7 {RATIO} (ref 0–5)
LDLC SERPL CALC-MCNC: 124.8 MG/DL (ref 0–100)
POTASSIUM SERPL-SCNC: 5.4 MMOL/L (ref 3.5–5.1)
PROT SERPL-MCNC: 7.8 G/DL (ref 6.4–8.2)
SODIUM SERPL-SCNC: 136 MMOL/L (ref 136–145)
TRIGL SERPL-MCNC: 101 MG/DL (ref ?–150)
VLDLC SERPL CALC-MCNC: 20.2 MG/DL

## 2022-03-18 PROBLEM — M05.79 RHEUMATOID ARTHRITIS INVOLVING MULTIPLE SITES WITH POSITIVE RHEUMATOID FACTOR (HCC): Status: ACTIVE | Noted: 2018-03-19

## 2022-03-18 PROBLEM — R10.31 RIGHT LOWER QUADRANT ABDOMINAL PAIN: Status: ACTIVE | Noted: 2020-07-31

## 2022-03-19 PROBLEM — J30.89 NON-SEASONAL ALLERGIC RHINITIS: Status: ACTIVE | Noted: 2019-02-25

## 2022-03-19 PROBLEM — I10 ESSENTIAL HYPERTENSION: Status: ACTIVE | Noted: 2018-03-19

## 2022-03-19 PROBLEM — R53.83 FATIGUE: Status: ACTIVE | Noted: 2019-04-09

## 2022-03-19 PROBLEM — E03.9 ACQUIRED HYPOTHYROIDISM: Status: ACTIVE | Noted: 2018-03-18

## 2022-03-19 PROBLEM — L23.9 ALLERGIC DERMATITIS: Status: ACTIVE | Noted: 2018-04-06

## 2022-03-20 PROBLEM — E78.5 DYSLIPIDEMIA: Status: ACTIVE | Noted: 2018-08-16

## 2022-03-20 PROBLEM — E66.811 CLASS 1 OBESITY DUE TO EXCESS CALORIES WITHOUT SERIOUS COMORBIDITY WITH BODY MASS INDEX (BMI) OF 30.0 TO 30.9 IN ADULT: Status: ACTIVE | Noted: 2018-04-05

## 2022-03-20 PROBLEM — E66.09 CLASS 1 OBESITY DUE TO EXCESS CALORIES WITHOUT SERIOUS COMORBIDITY WITH BODY MASS INDEX (BMI) OF 30.0 TO 30.9 IN ADULT: Status: ACTIVE | Noted: 2018-04-05

## 2022-04-18 RX ORDER — LORATADINE 10 MG/1
10 TABLET ORAL DAILY
Qty: 90 TABLET | Refills: 2 | Status: SHIPPED | OUTPATIENT
Start: 2022-04-18

## 2022-04-18 NOTE — TELEPHONE ENCOUNTER
PCP: Gerri Quinonez MD    Last appt: 3/10/2022  Future Appointments   Date Time Provider Nathan Patel   7/7/2022  8:10 AM Gerri Quinonez MD [facility]       Requested Prescriptions     Pending Prescriptions Disp Refills    loratadine (CLARITIN) 10 mg tablet 90 Tablet 2     Sig: Take 1 Tablet by mouth daily.          Other Comments:

## 2022-04-18 NOTE — TELEPHONE ENCOUNTER
Requested Prescriptions     Pending Prescriptions Disp Refills    loratadine (CLARITIN) 10 mg tablet 90 Tablet 2     Sig: Take 1 Tablet by mouth daily.        Last Refill: 4/14/20  Next Appointment:7/7/22

## 2022-07-06 PROBLEM — Z00.00 ANNUAL PHYSICAL EXAM: Status: ACTIVE | Noted: 2018-05-03

## 2022-07-06 NOTE — PROGRESS NOTES
HPI:   61 y.o.  presents for comprehensive annual healthcare examination and follow up appointment. No hospital, ER or specialist visits since last primary care visit except as noted below. He is followed regular for hypertension, hyperlipidemia diet-controlled, allergic rhinitis, hypothyroidism, rheumatoid arthritis, obesity and other medical problems. He is taking his medication trying to follow his diet try and remain physically active. He currently denies any chest pain, shortness of breath, palpitations, PND, orthopnea or other cardiac or respiratory complaints. He notes no GI or  complaints. He has no headaches, dizziness or neurologic complaints. There are no current active arthritic complaints and there are no other complaints on complete review of systems. 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     Patient Active Problem List    Diagnosis    Dyslipidemia    Class 1 obesity due to excess calories without serious comorbidity with body mass index (BMI) of 30.0 to 30.9 in adult    Essential hypertension    Rheumatoid arthritis involving multiple sites with positive rheumatoid factor (HCC)    Acquired hypothyroidism    Right lower quadrant abdominal pain    Fatigue    Non-seasonal allergic rhinitis    Annual physical exam    Allergic dermatitis       Past Medical History:   Diagnosis Date    DDD (degenerative disc disease), lumbar     Elevated BP     1/20/17 on no meds at this time    Hypothyroid     MARYAN on CPAP     RA [facility]rheumatoid [facility]) ([facility]        Social History     Tobacco Use    Smoking status: Never Smoker    Smokeless tobacco: Never Used   Vaping Use    Vaping Use: Never used   Substance Use Topics    Alcohol use: No    Drug use: No       Family History   Problem Relation Age of Onset    Hypertension Mother            Allergies   Allergen Reactions    Sulfa (Sulfonamide Antibiotics) Nausea and Vomiting       ROS:     Constitutional: He denies fevers, weight loss, sweats. Eyes: No blurred or double vision. ENT: No difficulty with swallowing, taste, speech or smell. Neck: no stiffness or swelling  Respiratory: No cough wheezing or shortness of breath. Cardiovascular: Denies chest pain, palpitations, unexplained indigestion or syncope. Gastrointestinal:  No changes in bowel movements, no abdominal pain, no bloating. Genitourinary:  He denies frequency, nocturia or stranguria. Extremities: No joint pain, stiffness or swelling. Neurological:  No numbness, tingling, burring paresthesias or loss of motor strength. No syncope, dizziness or frequent headache  Lymphatic: no adenopathy noted  Hematologic: no easy bruising or bleeding gums  Skin:  No recent rashes or mole changes. Psychiatric/Behavioral:  Negative for depression. The patient is not nervous/anxious. PE:     Vitals:    07/07/22 0823   BP: 132/84   Pulse: 86   Resp: 16   Temp: 97.9 °F (36.6 °C)   TempSrc: Oral   SpO2: 97%   Weight: 232 lb 8 oz (105.5 kg)   Height: 5' 10\" (1.778 m)   PainSc:   0 - No pain        General appearance - alert, well appearing, and in no distress  Mental status - alert, oriented to person, place, and time  HEENT:  Ears - bilateral TM's and external ear canals clear  Eyes - pupillary responses were normal.  Extraocular muscle function intact. Lids and conjunctiva not injected. Fundoscopic exam revealed sharp disc margins. eye movements intact  Pharynx- clear with teeth in good repair. No masses were noted  Neck - supple without thyromegaly or burit. No JVD noted  Lungs - clear to auscultation and percussion  Cardiac- normal rate, regular rhythm without murmurs. PMI not displaced. No gallop, rub or click  Abdomen - flat, soft, non-tender without palpable organomegaly or mass. No pulsatile mass was felt, and not bruit was heard.   Bowel sounds were active  : Circumcised, Testes descended w/o masses  Rectal: normal sphincter tone, prostate normal, no masses, stool brown and hemacult negative  Extremities -  no clubbing cyanosis or edema  Lymphatics - no palpable lymphadenopathy, no hepatosplenomegaly  Hematologic: no petechiae or purpura  Peripheral vascular -Femoral, Dorsalis pedis and posterior tibial pulses felt without difficulty  Skin - no rash or unusual mole change noted  Neurological - Cranial nerves II-XII grossly intact. Motor strength 5/5. DTR's 2+ and symmetric. Station and gait normal  Back exam - full range of motion, no tenderness, palpable spasm or pain on motion  Musculoskeletal - no joint tenderness, deformity or swelling      Assessment/Plan:     1. Annual physical exam    2. Essential hypertension    3. Dyslipidemia    4. Class 1 obesity due to excess calories without serious comorbidity with body mass index (BMI) of 30.0 to 30.9 in adult    5. Rheumatoid arthritis involving multiple sites with positive rheumatoid factor (HCC)    6. Acquired hypothyroidism    7. Non-seasonal allergic rhinitis, unspecified trigger        Impression  1. Annual physical examination is normal  2. Hypertension that is controlled so continue current treatment  3. Dyslipidemia prior lab reviewed repeat status pending  4. Obesity weight is down a few pounds and encouraged him to continue to work on diet, exercise and weight reduction for overall health benefit. 5.  Rheumatoid arthritis stable  6. Hypothyroidism repeat status pending  7. Allergic rhinitis stable  I will recheck him in 6 months or sooner if there is a problem. I will call with today's lab results. Health Maintenance reviewed - updated. Orders Placed This Encounter    CBC WITH AUTOMATED DIFF    METABOLIC PANEL, COMPREHENSIVE    LIPID PANEL    T4 (THYROXINE)    TSH 3RD GENERATION    URINALYSIS W/ REFLEX CULTURE    PSA SCREENING (SCREENING)       There are no discontinued medications.     Current Outpatient Medications   Medication Sig Dispense Refill    loratadine (CLARITIN) 10 mg tablet Take 1 Tablet by mouth daily. 90 Tablet 2    cyclobenzaprine (FLEXERIL) 10 mg tablet TAKE 1 TABLET BY MOUTH THREE TIMES DAILY AS NEEDED FOR MUSCLE SPASMS 30 Tablet 1    levothyroxine (SYNTHROID) 50 mcg tablet TAKE 1 TABLET BY MOUTH EVERY DAY. 90 Tablet 3    lisinopriL (PRINIVIL, ZESTRIL) 20 mg tablet TAKE 1 TABLET BY MOUTH DAILY 90 Tablet 3    fluticasone propionate (FLONASE) 50 mcg/actuation nasal spray SHAKE LIQUID AND USE 2 SPRAYS IN EACH NOSTRIL AT BEDTIME 3 Bottle 2    EnbreL SureClick 50 mg/mL (1 mL) injection 50 mg by SubCUTAneous route every seven (7) days.  aspirin delayed-release 81 mg tablet Take  by mouth daily. No results found for any visits on 07/07/22. Recommended healthy diet low in carbohydrates, fats, sodium and cholesterol. Recommended regular cardiovascular exercise 3-6 times per week for 30-60 minutes daily. Verbal and written instructions (see AVS) provided. Patient expresses understanding of diagnosis and treatment plan.       Everardo Kwan MD

## 2022-07-07 ENCOUNTER — OFFICE VISIT (OUTPATIENT)
Dept: INTERNAL MEDICINE CLINIC | Age: 57
End: 2022-07-07
Payer: COMMERCIAL

## 2022-07-07 VITALS
HEART RATE: 86 BPM | HEIGHT: 70 IN | BODY MASS INDEX: 33.28 KG/M2 | TEMPERATURE: 97.9 F | OXYGEN SATURATION: 97 % | RESPIRATION RATE: 16 BRPM | DIASTOLIC BLOOD PRESSURE: 84 MMHG | WEIGHT: 232.5 LBS | SYSTOLIC BLOOD PRESSURE: 132 MMHG

## 2022-07-07 DIAGNOSIS — M05.79 RHEUMATOID ARTHRITIS INVOLVING MULTIPLE SITES WITH POSITIVE RHEUMATOID FACTOR (HCC): ICD-10-CM

## 2022-07-07 DIAGNOSIS — Z00.00 ANNUAL PHYSICAL EXAM: Primary | ICD-10-CM

## 2022-07-07 DIAGNOSIS — I10 ESSENTIAL HYPERTENSION: ICD-10-CM

## 2022-07-07 DIAGNOSIS — J30.89 NON-SEASONAL ALLERGIC RHINITIS, UNSPECIFIED TRIGGER: ICD-10-CM

## 2022-07-07 DIAGNOSIS — E03.9 ACQUIRED HYPOTHYROIDISM: ICD-10-CM

## 2022-07-07 DIAGNOSIS — E78.5 DYSLIPIDEMIA: ICD-10-CM

## 2022-07-07 DIAGNOSIS — E66.09 CLASS 1 OBESITY DUE TO EXCESS CALORIES WITHOUT SERIOUS COMORBIDITY WITH BODY MASS INDEX (BMI) OF 30.0 TO 30.9 IN ADULT: ICD-10-CM

## 2022-07-07 LAB
ALBUMIN SERPL-MCNC: 4.6 G/DL (ref 3.5–5)
ALBUMIN/GLOB SERPL: 1.4 {RATIO} (ref 1.1–2.2)
ALP SERPL-CCNC: 66 U/L (ref 45–117)
ALT SERPL-CCNC: 37 U/L (ref 12–78)
ANION GAP SERPL CALC-SCNC: 5 MMOL/L (ref 5–15)
APPEARANCE UR: CLEAR
AST SERPL-CCNC: 21 U/L (ref 15–37)
BACTERIA URNS QL MICRO: NEGATIVE /HPF
BASOPHILS # BLD: 0.1 K/UL (ref 0–0.1)
BASOPHILS NFR BLD: 1 % (ref 0–1)
BILIRUB SERPL-MCNC: 0.4 MG/DL (ref 0.2–1)
BILIRUB UR QL: NEGATIVE
BUN SERPL-MCNC: 23 MG/DL (ref 6–20)
BUN/CREAT SERPL: 19 (ref 12–20)
CALCIUM SERPL-MCNC: 9.7 MG/DL (ref 8.5–10.1)
CHLORIDE SERPL-SCNC: 102 MMOL/L (ref 97–108)
CHOLEST SERPL-MCNC: 218 MG/DL
CO2 SERPL-SCNC: 27 MMOL/L (ref 21–32)
COLOR UR: NORMAL
CREAT SERPL-MCNC: 1.18 MG/DL (ref 0.7–1.3)
DIFFERENTIAL METHOD BLD: ABNORMAL
EOSINOPHIL # BLD: 0.1 K/UL (ref 0–0.4)
EOSINOPHIL NFR BLD: 2 % (ref 0–7)
EPITH CASTS URNS QL MICRO: NORMAL /LPF
ERYTHROCYTE [DISTWIDTH] IN BLOOD BY AUTOMATED COUNT: 12.8 % (ref 11.5–14.5)
GLOBULIN SER CALC-MCNC: 3.2 G/DL (ref 2–4)
GLUCOSE SERPL-MCNC: 100 MG/DL (ref 65–100)
GLUCOSE UR STRIP.AUTO-MCNC: NEGATIVE MG/DL
HCT VFR BLD AUTO: 50 % (ref 36.6–50.3)
HDLC SERPL-MCNC: 47 MG/DL
HDLC SERPL: 4.6 {RATIO} (ref 0–5)
HGB BLD-MCNC: 16.5 G/DL (ref 12.1–17)
HGB UR QL STRIP: NEGATIVE
IMM GRANULOCYTES # BLD AUTO: 0.1 K/UL (ref 0–0.04)
IMM GRANULOCYTES NFR BLD AUTO: 1 % (ref 0–0.5)
KETONES UR QL STRIP.AUTO: NEGATIVE MG/DL
LDLC SERPL CALC-MCNC: 138.8 MG/DL (ref 0–100)
LEUKOCYTE ESTERASE UR QL STRIP.AUTO: NEGATIVE
LYMPHOCYTES # BLD: 2 K/UL (ref 0.8–3.5)
LYMPHOCYTES NFR BLD: 29 % (ref 12–49)
MCH RBC QN AUTO: 32.4 PG (ref 26–34)
MCHC RBC AUTO-ENTMCNC: 33 G/DL (ref 30–36.5)
MCV RBC AUTO: 98 FL (ref 80–99)
MONOCYTES # BLD: 0.6 K/UL (ref 0–1)
MONOCYTES NFR BLD: 9 % (ref 5–13)
NEUTS SEG # BLD: 4 K/UL (ref 1.8–8)
NEUTS SEG NFR BLD: 58 % (ref 32–75)
NITRITE UR QL STRIP.AUTO: NEGATIVE
NRBC # BLD: 0 K/UL (ref 0–0.01)
NRBC BLD-RTO: 0 PER 100 WBC
PH UR STRIP: 5.5 [PH] (ref 5–8)
PLATELET # BLD AUTO: 286 K/UL (ref 150–400)
PMV BLD AUTO: 9.7 FL (ref 8.9–12.9)
POTASSIUM SERPL-SCNC: 5.1 MMOL/L (ref 3.5–5.1)
PROT SERPL-MCNC: 7.8 G/DL (ref 6.4–8.2)
PROT UR STRIP-MCNC: NEGATIVE MG/DL
PSA SERPL-MCNC: 0.9 NG/ML (ref 0.01–4)
RBC # BLD AUTO: 5.1 M/UL (ref 4.1–5.7)
RBC #/AREA URNS HPF: NORMAL /HPF (ref 0–5)
SODIUM SERPL-SCNC: 134 MMOL/L (ref 136–145)
SP GR UR REFRACTOMETRY: <1.005 (ref 1–1.03)
T4 SERPL-MCNC: 8.7 UG/DL (ref 4.5–12.1)
TRIGL SERPL-MCNC: 161 MG/DL (ref ?–150)
TSH SERPL DL<=0.05 MIU/L-ACNC: 1.9 UIU/ML (ref 0.36–3.74)
UA: UC IF INDICATED,UAUC: NORMAL
UR CULT HOLD, URHOLD: NORMAL
UROBILINOGEN UR QL STRIP.AUTO: 0.2 EU/DL (ref 0.2–1)
VLDLC SERPL CALC-MCNC: 32.2 MG/DL
WBC # BLD AUTO: 6.9 K/UL (ref 4.1–11.1)
WBC URNS QL MICRO: NORMAL /HPF (ref 0–4)

## 2022-07-07 PROCEDURE — 99396 PREV VISIT EST AGE 40-64: CPT | Performed by: INTERNAL MEDICINE

## 2022-07-07 NOTE — PATIENT INSTRUCTIONS
Allergies: Care Instructions  Overview     Allergies occur when your body's defense system (immune system) overreacts to certain substances. The immune system treats a harmless substance as if it were a harmful germ or virus. Many things can make this happen. These include pollens, medicine, food, dust, animal dander, and mold. Allergies can be mild or severe. Mild allergies can be managed with home treatment. But medicine may be needed to prevent problems. Managing your allergies is an important part of staying healthy. Your doctor may suggest that you have allergy testing to help find out what is causing your allergies. Severe allergies can cause reactions that affect your whole body (anaphylactic reactions). Your doctor may prescribe a shot of epinephrine to carry with you in case you have a severe reaction. Learn how to give yourself the shot and keep it with you at all times. Make sure it is not . Follow-up care is a key part of your treatment and safety. Be sure to make and go to all appointments, and call your doctor if you are having problems. It's also a good idea to know your test results and keep a list of the medicines you take. How can you care for yourself at home? · If you have been told by your doctor that dust or dust mites are causing your allergy, decrease the dust around your bed:  ? Wash sheets, pillowcases, and other bedding in hot water every week. ? Use dust-proof covers for pillows, duvets, and mattresses. Avoid plastic covers because they tear easily and do not \"breathe. \" Wash as instructed on the label. ? Do not use any blankets and pillows that you do not need. ? Use blankets that you can wash in your washing machine. ? Consider removing drapes and carpets, which attract and hold dust, from your bedroom. · If you are allergic to house dust and mites, do not use home humidifiers. Your doctor can suggest ways you can control dust and mites.   · Look for signs of cockroaches. Cockroaches cause allergic reactions. Use cockroach baits to get rid of them. Then, clean your home well. Cockroaches like areas where grocery bags, newspapers, empty bottles, or cardboard boxes are stored. Do not keep these inside your home, and keep trash and food containers sealed. Seal off any spots where cockroaches might enter your home. · If you are allergic to mold, get rid of furniture, rugs, and drapes that smell musty. Check for mold in the bathroom. · If you are allergic to outdoor pollen or mold spores, use air-conditioning. Change or clean all filters every month. Keep windows closed. · If you are allergic to pollen, stay inside when pollen counts are high. Use a vacuum  with a HEPA filter or a double-thickness filter at least two times each week. · Stay inside when air pollution is bad. Avoid paint fumes, perfumes, and other strong odors. · Avoid conditions that make your allergies worse. Stay away from smoke. Do not smoke or let anyone else smoke in your house. Do not use fireplaces or wood-burning stoves. · If you are allergic to your pets, change the air filter in your furnace every month. Use high-efficiency filters. · If you are allergic to pet dander, keep pets outside or out of your bedroom. Old carpet and cloth furniture can hold a lot of animal dander. You may need to replace them. When should you call for help? Give an epinephrine shot if:    · You think you are having a severe allergic reaction.     · You have symptoms in more than one body area, such as mild nausea and an itchy mouth. After giving an epinephrine shot call 911, even if you feel better. Call 911 if:    · You have symptoms of a severe allergic reaction. These may include:  ? Sudden raised, red areas (hives) all over your body. ? Swelling of the throat, mouth, lips, or tongue. ? Trouble breathing. ? Passing out (losing consciousness).  Or you may feel very lightheaded or suddenly feel weak, confused, or restless.     · You have been given an epinephrine shot, even if you feel better. Call your doctor now or seek immediate medical care if:    · You have symptoms of an allergic reaction, such as:  ? A rash or hives (raised, red areas on the skin). ? Itching. ? Swelling. ? Belly pain, nausea, or vomiting. Watch closely for changes in your health, and be sure to contact your doctor if:    · You do not get better as expected. Where can you learn more? Go to http://www.white.com/  Enter W171 in the search box to learn more about \"Allergies: Care Instructions. \"  Current as of: February 10, 2021               Content Version: 13.2  © 2006-2022 [employer]. Care instructions adapted under license by [employer] (which disclaims liability or warranty for this information). If you have questions about a medical condition or this instruction, always ask your healthcare professional. [place] [address] any warranty or liability for your use of this information.

## 2022-07-07 NOTE — PROGRESS NOTES
Chief Complaint   Patient presents with    Complete Physical     Visit Vitals  /84 (BP 1 Location: Left upper arm, BP Patient Position: Sitting, BP Cuff Size: Large adult)   Pulse 86   Temp 97.9 °F (36.6 °C) (Oral)   Resp 16   Ht 5' 10\" (1.778 m)   Wt 232 lb 8 oz (105.5 kg)   SpO2 97%   BMI 33.36 kg/m²     1. Have you been to the ER, urgent care clinic since your last visit? Hospitalized since your last visit? 5/27/22 [facility]:sinus infection    2. Have you seen or consulted any other health care providers outside of the [address] [place] [address] since your last visit? Include any pap smears or colon screening.  No

## 2022-07-08 NOTE — PROGRESS NOTES
Slight increase cholesterol and LDL so now that retired work on diet, exercise and weight reduction or otherwise we will need to have a statin

## 2022-07-08 NOTE — PROGRESS NOTES
Slight increase cholesterol and LDL so now that retired work on diet, exercise and weight reduction or otherwise we will need to have a statin  Lab letter sent

## 2022-08-05 PROBLEM — Z00.00 ANNUAL PHYSICAL EXAM: Status: RESOLVED | Noted: 2018-05-03 | Resolved: 2022-08-05

## 2022-08-12 RX ORDER — FLUTICASONE PROPIONATE 50 MCG
SPRAY, SUSPENSION (ML) NASAL
Qty: 3 EACH | Refills: 2 | Status: SHIPPED | OUTPATIENT
Start: 2022-08-12

## 2022-08-12 NOTE — TELEPHONE ENCOUNTER
PCP: Kane Briseno MD    Last appt: Visit date not found  Future Appointments   Date Time Provider Nathan Patel   1/9/2023  8:00 AM MD ISABEL Dunne BS AMB   7/7/2023  8:00 AM MD ISABEL Dunne BS AMB       Last refilled7/1/21    Requested Prescriptions     Pending Prescriptions Disp Refills    fluticasone propionate (FLONASE) 50 mcg/actuation nasal spray [Pharmacy Med Name: FLUTICASONE 50MCG NASAL SP (120) RX] 3 Each 2     Sig: SHAKE LIQUID AND USE 2 SPRAYS IN EACH NOSTRIL AT BEDTIME

## 2022-10-13 DIAGNOSIS — I10 ESSENTIAL HYPERTENSION: ICD-10-CM

## 2022-10-13 RX ORDER — LEVOTHYROXINE SODIUM 50 UG/1
TABLET ORAL
Qty: 90 TABLET | Refills: 3 | Status: SHIPPED | OUTPATIENT
Start: 2022-10-13

## 2022-10-13 RX ORDER — LISINOPRIL 20 MG/1
TABLET ORAL
Qty: 90 TABLET | Refills: 3 | Status: SHIPPED | OUTPATIENT
Start: 2022-10-13

## 2022-10-13 NOTE — TELEPHONE ENCOUNTER
PCP: Martha Foote MD    Last appt: Visit date not found  Future Appointments   Date Time Provider Nathan Patel   1/9/2023  8:00 AM MD ISABEL Harding BS AMB   7/7/2023  8:00 AM MD ISABEL Harding BS AMB       Last refilled:9/29/21    Requested Prescriptions     Pending Prescriptions Disp Refills    levothyroxine (SYNTHROID) 50 mcg tablet [Pharmacy Med Name: LEVOTHYROXINE 0.05MG (50MCG) TAB] 90 Tablet 3     Sig: TAKE 1 TABLET BY MOUTH EVERY DAY.     lisinopriL (PRINIVIL, ZESTRIL) 20 mg tablet [Pharmacy Med Name: LISINOPRIL 20MG TABLETS] 90 Tablet 3     Sig: TAKE 1 TABLET BY MOUTH DAILY

## 2022-12-05 RX ORDER — CYCLOBENZAPRINE HCL 10 MG
TABLET ORAL
Qty: 30 TABLET | Refills: 1 | Status: SHIPPED | OUTPATIENT
Start: 2022-12-05

## 2022-12-05 RX ORDER — FLUTICASONE PROPIONATE 50 MCG
SPRAY, SUSPENSION (ML) NASAL
Qty: 48 G | Refills: 5 | Status: SHIPPED | OUTPATIENT
Start: 2022-12-05

## 2022-12-05 NOTE — TELEPHONE ENCOUNTER
RX refill request from the patient/pharmacy. Patient last seen 07- with labs, and next appt. scheduled for 01-  Requested Prescriptions     Pending Prescriptions Disp Refills    fluticasone propionate (FLONASE) 50 mcg/actuation nasal spray [Pharmacy Med Name: FLUTICASONE 50MCG NASAL SP (120) RX] 48 g 5     Sig: SHAKE LIQUID AND USE 2 SPRAYS IN EACH NOSTRIL AT BEDTIME    cyclobenzaprine (FLEXERIL) 10 mg tablet [Pharmacy Med Name: CYCLOBENZAPRINE 10MG TABLETS] 30 Tablet 1     Sig: TAKE 1 TABLET BY MOUTH THREE TIMES DAILY AS NEEDED FOR MUSCLE SPASMS    .

## 2022-12-14 RX ORDER — PREDNISONE 5 MG/1
TABLET ORAL
Qty: 21 TABLET | Refills: 0 | Status: SHIPPED | OUTPATIENT
Start: 2022-12-14

## 2022-12-21 ENCOUNTER — OFFICE VISIT (OUTPATIENT)
Dept: INTERNAL MEDICINE CLINIC | Age: 57
End: 2022-12-21
Payer: COMMERCIAL

## 2022-12-21 VITALS
HEART RATE: 90 BPM | WEIGHT: 241.2 LBS | HEIGHT: 70 IN | OXYGEN SATURATION: 97 % | DIASTOLIC BLOOD PRESSURE: 74 MMHG | TEMPERATURE: 98.7 F | BODY MASS INDEX: 34.53 KG/M2 | RESPIRATION RATE: 16 BRPM | SYSTOLIC BLOOD PRESSURE: 126 MMHG

## 2022-12-21 DIAGNOSIS — L23.9 ALLERGIC DERMATITIS: Primary | ICD-10-CM

## 2022-12-21 PROCEDURE — 96372 THER/PROPH/DIAG INJ SC/IM: CPT | Performed by: INTERNAL MEDICINE

## 2022-12-21 PROCEDURE — 3078F DIAST BP <80 MM HG: CPT | Performed by: INTERNAL MEDICINE

## 2022-12-21 PROCEDURE — 99213 OFFICE O/P EST LOW 20 MIN: CPT | Performed by: INTERNAL MEDICINE

## 2022-12-21 PROCEDURE — 3074F SYST BP LT 130 MM HG: CPT | Performed by: INTERNAL MEDICINE

## 2022-12-21 RX ORDER — MOMETASONE FUROATE 1 MG/G
CREAM TOPICAL DAILY
Qty: 30 G | Refills: 0 | Status: SHIPPED | OUTPATIENT
Start: 2022-12-21

## 2022-12-21 RX ORDER — METHYLPREDNISOLONE ACETATE 40 MG/ML
40 INJECTION, SUSPENSION INTRA-ARTICULAR; INTRALESIONAL; INTRAMUSCULAR; SOFT TISSUE ONCE
Qty: 1 ML | Refills: 0
Start: 2022-12-21 | End: 2022-12-21

## 2022-12-21 NOTE — PROGRESS NOTES
Subjective:   Wagner Silverman is a 61 y.o. male      Chief Complaint   Patient presents with    Rash     Patient reports rash on right side x 1 week        History of present illness: He presents complaining of pruritic rash in the right flank area is been present for about a week. We did try some treatment with Medrol Dosepak and that has not been beneficial.  He notes no other complaints. He was helping a friend Myah [place] a fence line before the rash came out.     Patient Active Problem List   Diagnosis Code    Acquired hypothyroidism E03.9    Essential hypertension I10    Rheumatoid arthritis involving multiple sites with positive rheumatoid factor (HCC) M05.79    Class 1 obesity due to excess calories without serious comorbidity with body mass index (BMI) of 30.0 to 30.9 in adult E66.09, Z68.30    Allergic dermatitis L23.9    Dyslipidemia E78.5    Non-seasonal allergic rhinitis J30.89    Fatigue R53.83    Right lower quadrant abdominal pain R10.31      Past Medical History:   Diagnosis Date    DDD (degenerative disc disease), lumbar     Elevated BP     1/20/17 on no meds at this time    Hypothyroid     MARYAN on CPAP     RA (rheumatoid arthritis) ([facility])       Allergies   Allergen Reactions    Sulfa (Sulfonamide Antibiotics) Nausea and Vomiting      Family History   Problem Relation Age of Onset    Hypertension Mother       Social History     Socioeconomic History    Marital status:      Spouse name: Not on file    Number of children: Not on file    Years of education: Not on file    Highest education level: Not on file   Occupational History    Not on file   Tobacco Use    Smoking status: Never    Smokeless tobacco: Never   Vaping Use    Vaping Use: Never used   Substance and Sexual Activity    Alcohol use: No    Drug use: No    Sexual activity: Not on file   Other Topics Concern    Not on file   Social History Narrative    Not on file     Social Determinants of Health     Financial Resource Strain: Not on file Food Insecurity: Not on file   Transportation Needs: Not on file   Physical Activity: Not on file   Stress: Not on file   Social Connections: Not on file   Intimate Partner Violence: Not on file   Housing Stability: Not on file     Prior to Admission medications    Medication Sig Start Date End Date Taking? Authorizing Provider   methylPREDNISolone acetate (DEPO-MEDROL) 40 mg/mL injection 1 mL by IntraMUSCular route once for 1 dose. 12/21/22 12/21/22 Yes Estela Pearl MD   mometasone (ELOCON) 0.1 % topical cream Apply  to affected area daily. 12/21/22  Yes Estela Pearl MD   fluticasone propionate (FLONASE) 50 mcg/actuation nasal spray SHAKE LIQUID AND USE 2 SPRAYS IN EACH NOSTRIL AT BEDTIME 12/5/22  Yes Estela Pearl MD   cyclobenzaprine (FLEXERIL) 10 mg tablet TAKE 1 TABLET BY MOUTH THREE TIMES DAILY AS NEEDED FOR MUSCLE SPASMS 12/5/22  Yes Estela Pearl MD   levothyroxine (SYNTHROID) 50 mcg tablet TAKE 1 TABLET BY MOUTH EVERY DAY. 10/13/22  Yes Estela Pearl MD   lisinopriL (PRINIVIL, ZESTRIL) 20 mg tablet TAKE 1 TABLET BY MOUTH DAILY 10/13/22  Yes Estela Pearl MD   loratadine (CLARITIN) 10 mg tablet Take 1 Tablet by mouth daily. 4/18/22  Yes Estela Pearl MD   EnbreL SureClick 50 mg/mL (1 mL) injection 50 mg by SubCUTAneous route every seven (7) days. 1/21/20  Yes Provider, Historical   aspirin delayed-release 81 mg tablet Take  by mouth daily. Yes Provider, Historical        Review of Systems              Constitutional:  He denies fever, weight loss, sweats or fatigue. EYES: No blurred or double vision,               ENT: no nasal congestion, no headache or dizziness. No difficulty with               swallowing, taste, speech or smell. Respiratory:  No cough, wheezing or shortness of breath. No sputum production. Cardiac:  Denies chest pain, palpitations, unexplained indigestion, syncope, edema, PND or orthopnea.     GI:  No changes in bowel movements, no abdominal pain, no bloating, anorexia, nausea, vomiting or heartburn. :  No frequency or dysuria. Denies incontinence or sexual dysfunction. Extremities:  No joint pain, stiffness or swelling  Back:.no pain or soreness  Skin:  No recent rashes or mole changes except pruritic rash right flank. Neurological:  No numbness, tingling, burning paresthesias or loss of motor strength. No syncope, dizziness, frequent headaches or memory loss. Hematologic:  No easy bruising  Lymphatic: No lymph node enlargement    Objective:     Vitals:    12/21/22 1512   BP: 126/74   Pulse: 90   Resp: 16   Temp: 98.7 °F (37.1 °C)   TempSrc: Oral   SpO2: 97%   Weight: 241 lb 3.2 oz (109.4 kg)   Height: 5' 10\" (1.778 m)   PainSc:   0 - No pain       Body mass index is 34.61 kg/m². Physical Examination:              General Appearance:  Well-developed, well-nourished, no acute distress. HEENT:      Ears:  The TMs and ear canals were clear. Eyes:  The pupillary responses were normal.  Extraocular muscle function intact. Lids and conjunctiva not injected. Funduscopic exam revealed sharp disc margins. Nares: Clear w/o edema or erythema  Pharynx:  Clear with teeth in good repair. No masses were noted. Neck:  Supple without thyromegaly or adenopathy. No JVD noted. No carotid                bruits. Lungs:  Clear to auscultation and percussion. Cardiac:  Regular rate and rhythm without murmur. PMI not displaced. No gallop, rub or click. Abdominal: Soft, non-tender, no hepata-spleenomegally or masses  Extremities:  No clubbing, cyanosis or edema. Skin:  No rash or unusual mole changes noted except rash right flank consistent with contact dermatitis. Lymph Nodes:  None felt in the cervical, supraclavicular, axillary or inguinal region. Neurological: . DTRs 2+ and symmetric. Station and gait normal.   Hematologic:   No purpura or petechiae        Assessment/Plan:         1.  Allergic dermatitis Impressions/Plan:  Impression  Allergic dermatitis we will treat this with Depo-Medrol 40 IM. I did give him some topical Elocon cream  Recheck if not resolved. Orders Placed This Encounter    methylPREDNISolone acetate (DEPO-MEDROL) 40 mg/mL injection    mometasone (ELOCON) 0.1 % topical cream       Follow-up and Dispositions    Return TBD. No results found for any visits on 12/21/22. Donna Draper MD    The patient was given after the visit summary the patient verbalized an understanding of the plans and problems as explained.

## 2022-12-21 NOTE — PROGRESS NOTES
Chief Complaint   Patient presents with    Rash     Patient reports rash on right side x 1 week     Visit Vitals  /74 (BP 1 Location: Left upper arm, BP Patient Position: Sitting, BP Cuff Size: Large adult)   Pulse 90   Temp 98.7 °F (37.1 °C) (Oral)   Resp 16   Ht 5' 10\" (1.778 m)   Wt 241 lb 3.2 oz (109.4 kg)   SpO2 97%   BMI 34.61 kg/m²     1. Have you been to the ER, urgent care clinic since your last visit? Hospitalized since your last visit? No    2. Have you seen or consulted any other health care providers outside of the [address] [place] [address] since your last visit? Include any pap smears or colon screening. No      After obtaining consent, and per orders of Dr. Macdonald Prior, injection of methylprednisolone 1 mL was given IM in left deltoid by Kathy Monk LPN. Patient instructed to remain in clinic for 20 minutes afterwards, and to report any adverse reaction to me immediately.

## 2023-01-06 NOTE — PROGRESS NOTES
Chief Complaint   Patient presents with    Hypertension     6 month follow up    Hypothyroidism       SUBJECTIVE:    Per Freeman is a 61 y.o. male who returns in follow-up of his medical problems include hypertension, hyperlipidemia, allergic rhinitis, obesity, rheumatoid arthritis and other medical problems. He is taking his medications trying to follow his diet remains physically active. Currently he denies any chest pain, shortness of breath or cardiac respiratory complaints. There are no current GI or  complaints. No headaches, dizziness or neurologic complaints. There are no current active arthritic complaints and there are no other complaints on complete review of systems. Current Outpatient Medications   Medication Sig Dispense Refill    mometasone (ELOCON) 0.1 % topical cream Apply  to affected area daily. 30 g 0    fluticasone propionate (FLONASE) 50 mcg/actuation nasal spray SHAKE LIQUID AND USE 2 SPRAYS IN EACH NOSTRIL AT BEDTIME 48 g 5    cyclobenzaprine (FLEXERIL) 10 mg tablet TAKE 1 TABLET BY MOUTH THREE TIMES DAILY AS NEEDED FOR MUSCLE SPASMS 30 Tablet 1    levothyroxine (SYNTHROID) 50 mcg tablet TAKE 1 TABLET BY MOUTH EVERY DAY. 90 Tablet 3    lisinopriL (PRINIVIL, ZESTRIL) 20 mg tablet TAKE 1 TABLET BY MOUTH DAILY 90 Tablet 3    loratadine (CLARITIN) 10 mg tablet Take 1 Tablet by mouth daily. 90 Tablet 2    EnbreL SureClick 50 mg/mL (1 mL) injection 50 mg by SubCUTAneous route every seven (7) days. aspirin delayed-release 81 mg tablet Take  by mouth daily. Past Medical History:   Diagnosis Date    DDD (degenerative disc disease), lumbar     Elevated BP     1/20/17 on no meds at this time    Hypothyroid     MARYAN on CPAP     RA (rheumatoid arthritis) ([facility] [address]      History reviewed. No pertinent surgical history.   Allergies   Allergen Reactions    Sulfa (Sulfonamide Antibiotics) Nausea and Vomiting       REVIEW OF SYSTEMS:  General: negative for - chills or fever, or weight loss or gain  ENT: negative for - headaches, nasal congestion or tinnitus  Eyes: no blurred or visual changes  Neck: No stiffness or swollen nodes  Respiratory: negative for - cough, hemoptysis, shortness of breath or wheezing  Cardiovascular : negative for - chest pain, edema, palpitations or shortness of breath  Gastrointestinal: negative for - abdominal pain, blood in stools, heartburn or nausea/vomiting  Genito-Urinary: no dysuria, trouble voiding, or hematuria  Musculoskeletal: negative for - gait disturbance, joint pain, joint stiffness or joint swelling  Neurological: no TIA or stroke symptoms  Hematologic: no bruises, no bleeding  Lymphatic: no swollen glands  Integument: no lumps, mole changes, nail changes or rash  Endocrine:no malaise/lethargy poly uria or polydipsia or unexpected weight changes        Social History     Socioeconomic History    Marital status:    Tobacco Use    Smoking status: Never    Smokeless tobacco: Never   Vaping Use    Vaping Use: Never used   Substance and Sexual Activity    Alcohol use: No    Drug use: No     Family History   Problem Relation Age of Onset    Hypertension Mother        OBJECTIVE:     Visit Vitals  /76 (BP 1 Location: Left upper arm, BP Patient Position: Sitting, BP Cuff Size: Large adult)   Pulse 79   Temp 98.2 °F (36.8 °C) (Oral)   Resp 16   Ht 5' 10\" (1.778 m)   Wt 240 lb 1.6 oz (108.9 kg)   SpO2 96%   BMI 34.45 kg/m²     CONSTITUTIONAL:   well nourished, appears age appropriate  EYES: sclera anicteric, PERRL, EOMI  ENMT:nares clear, moist mucous membranes, pharynx clear  NECK: supple.  Thyroid normal, No JVD or bruits  RESPIRATORY: Chest: clear to ascultation and percussion, normal inspiratory effort  CARDIOVASCULAR: Heart: regular rate and rhythm no murmurs, rubs or gallops, PMI not displaced, No thrills, no peripheral edema  GASTROINTESTINAL: Abdomen: non distended, soft, non tender, bowel sounds normal  HEMATOLOGIC: no purpura, petechiae or bruising  LYMPHATIC: No lymph node enlargemant  MUSCULOSKELETAL: Extremities: no active synovitis, pulse 1+   INTEGUMENT: No unusual rashes or suspicious skin lesions noted. Nails appear normal.  PERIPHERAL VASCULAR: normal pulses femoral, PT and DP  NEUROLOGIC: non-focal exam, A & O X 3  PSYCHIATRIC:, appropriate affect     ASSESSMENT:   1. Essential hypertension    2. Dyslipidemia    3. Rheumatoid arthritis involving multiple sites with positive rheumatoid factor (HCC)    4. Class 1 obesity due to excess calories without serious comorbidity with body mass index (BMI) of 30.0 to 30.9 in adult    5. Non-seasonal allergic rhinitis, unspecified trigger      Impression  1. Hypertension that is controlled so continue current therapy reviewed with him  2. Dyslipidemia prior lab reviewed repeat status pending  3. Rheumatoid arthritis still followed by rheumatology and stable clinically on Enbrel  4. Obesity weight is stable and we did again discuss diet, exercise weight reduction for overall health benefit and he says he and his wife have joined a gym. 5 allergic rhinitis stable  I will call with lab and follow stable continue same and I will recheck him again myself in 6 months or sooner if there is a problem. PLAN:  .  Orders Placed This Encounter    METABOLIC PANEL, COMPREHENSIVE    LIPID PANEL    CK         ATTENTION:   This medical record was transcribed using an electronic medical records system. Although proofread, it may and can contain electronic and spelling errors. Other human spelling and other errors may be present. Corrections may be executed at a later time. Please feel free to contact us for any clarifications as needed. Follow-up and Dispositions    Return in about 6 months (around 7/9/2023). No results found for any visits on 01/09/23. Safia Dinh MD    The patient verbalized understanding of the problems and plans as explained.

## 2023-01-09 ENCOUNTER — OFFICE VISIT (OUTPATIENT)
Dept: INTERNAL MEDICINE CLINIC | Age: 58
End: 2023-01-09
Payer: COMMERCIAL

## 2023-01-09 VITALS
WEIGHT: 240.1 LBS | RESPIRATION RATE: 16 BRPM | SYSTOLIC BLOOD PRESSURE: 112 MMHG | BODY MASS INDEX: 34.37 KG/M2 | OXYGEN SATURATION: 96 % | TEMPERATURE: 98.2 F | DIASTOLIC BLOOD PRESSURE: 76 MMHG | HEIGHT: 70 IN | HEART RATE: 79 BPM

## 2023-01-09 DIAGNOSIS — J30.89 NON-SEASONAL ALLERGIC RHINITIS, UNSPECIFIED TRIGGER: ICD-10-CM

## 2023-01-09 DIAGNOSIS — M05.79 RHEUMATOID ARTHRITIS INVOLVING MULTIPLE SITES WITH POSITIVE RHEUMATOID FACTOR (HCC): ICD-10-CM

## 2023-01-09 DIAGNOSIS — E78.5 DYSLIPIDEMIA: ICD-10-CM

## 2023-01-09 DIAGNOSIS — E66.09 CLASS 1 OBESITY DUE TO EXCESS CALORIES WITHOUT SERIOUS COMORBIDITY WITH BODY MASS INDEX (BMI) OF 30.0 TO 30.9 IN ADULT: ICD-10-CM

## 2023-01-09 DIAGNOSIS — I10 ESSENTIAL HYPERTENSION: Primary | ICD-10-CM

## 2023-01-09 LAB
ALBUMIN SERPL-MCNC: 4.4 G/DL (ref 3.5–5)
ALBUMIN/GLOB SERPL: 1.3 (ref 1.1–2.2)
ALP SERPL-CCNC: 60 U/L (ref 45–117)
ALT SERPL-CCNC: 68 U/L (ref 12–78)
ANION GAP SERPL CALC-SCNC: 5 MMOL/L (ref 5–15)
AST SERPL-CCNC: 31 U/L (ref 15–37)
BILIRUB SERPL-MCNC: 0.5 MG/DL (ref 0.2–1)
BUN SERPL-MCNC: 22 MG/DL (ref 6–20)
BUN/CREAT SERPL: 19 (ref 12–20)
CALCIUM SERPL-MCNC: 9.3 MG/DL (ref 8.5–10.1)
CHLORIDE SERPL-SCNC: 103 MMOL/L (ref 97–108)
CHOLEST SERPL-MCNC: 214 MG/DL
CK SERPL-CCNC: 209 U/L (ref 39–308)
CO2 SERPL-SCNC: 29 MMOL/L (ref 21–32)
CREAT SERPL-MCNC: 1.15 MG/DL (ref 0.7–1.3)
GLOBULIN SER CALC-MCNC: 3.3 G/DL (ref 2–4)
GLUCOSE SERPL-MCNC: 97 MG/DL (ref 65–100)
HDLC SERPL-MCNC: 50 MG/DL
HDLC SERPL: 4.3 (ref 0–5)
LDLC SERPL CALC-MCNC: 141.6 MG/DL (ref 0–100)
POTASSIUM SERPL-SCNC: 4.8 MMOL/L (ref 3.5–5.1)
PROT SERPL-MCNC: 7.7 G/DL (ref 6.4–8.2)
SODIUM SERPL-SCNC: 137 MMOL/L (ref 136–145)
TRIGL SERPL-MCNC: 112 MG/DL (ref ?–150)
VLDLC SERPL CALC-MCNC: 22.4 MG/DL

## 2023-01-09 PROCEDURE — 3074F SYST BP LT 130 MM HG: CPT | Performed by: INTERNAL MEDICINE

## 2023-01-09 PROCEDURE — 99214 OFFICE O/P EST MOD 30 MIN: CPT | Performed by: INTERNAL MEDICINE

## 2023-01-09 PROCEDURE — 3078F DIAST BP <80 MM HG: CPT | Performed by: INTERNAL MEDICINE

## 2023-01-09 NOTE — PROGRESS NOTES
Chief Complaint   Patient presents with    Hypertension     6 month follow up    Hypothyroidism     Visit Vitals  /76 (BP 1 Location: Left upper arm, BP Patient Position: Sitting, BP Cuff Size: Large adult)   Pulse 79   Temp 98.2 °F (36.8 °C) (Oral)   Resp 16   Ht 5' 10\" (1.778 m)   Wt 240 lb 1.6 oz (108.9 kg)   SpO2 96%   BMI 34.45 kg/m²     1. Have you been to the ER, urgent care clinic since your last visit? Hospitalized since your last visit? No    2. Have you seen or consulted any other health care providers outside of the [address] [place] [address] since your last visit? Include any pap smears or colon screening.  No

## 2023-01-13 RX ORDER — LORATADINE 10 MG/1
10 TABLET ORAL DAILY
Qty: 90 TABLET | Refills: 3 | Status: SHIPPED | OUTPATIENT
Start: 2023-01-13

## 2023-01-13 NOTE — TELEPHONE ENCOUNTER
RX refill request from the patient/pharmacy. Patient last seen 01- with labs, and next appt. scheduled for 07-  Requested Prescriptions     Pending Prescriptions Disp Refills    loratadine (CLARITIN) 10 mg tablet [Pharmacy Med Name: LORATADINE 10MG TABLETS] 90 Tablet 3     Sig: TAKE 1 TABLET BY MOUTH DAILY    .

## 2023-05-24 RX ORDER — MOMETASONE FUROATE 1 MG/G
CREAM TOPICAL DAILY
COMMUNITY
Start: 2022-12-21 | End: 2023-07-05 | Stop reason: ALTCHOICE

## 2023-05-24 RX ORDER — MEDROXYPROGESTERONE ACETATE 150 MG/ML
50 INJECTION, SUSPENSION INTRAMUSCULAR
COMMUNITY
Start: 2020-01-21

## 2023-05-24 RX ORDER — LEVOTHYROXINE SODIUM 0.05 MG/1
1 TABLET ORAL DAILY
COMMUNITY
Start: 2022-10-13

## 2023-05-24 RX ORDER — LORATADINE 10 MG/1
10 TABLET ORAL DAILY
COMMUNITY
Start: 2023-01-13

## 2023-05-24 RX ORDER — LISINOPRIL 20 MG/1
1 TABLET ORAL DAILY
COMMUNITY
Start: 2022-10-13

## 2023-05-24 RX ORDER — FLUTICASONE PROPIONATE 50 MCG
SPRAY, SUSPENSION (ML) NASAL
COMMUNITY
Start: 2022-12-05

## 2023-05-24 RX ORDER — CYCLOBENZAPRINE HCL 10 MG
1 TABLET ORAL 3 TIMES DAILY PRN
COMMUNITY
Start: 2022-12-05

## 2023-05-24 RX ORDER — ASPIRIN 81 MG/1
TABLET ORAL DAILY
COMMUNITY

## 2023-07-05 PROBLEM — I10 ESSENTIAL HYPERTENSION: Status: ACTIVE | Noted: 2018-03-19

## 2023-07-05 PROBLEM — E78.5 DYSLIPIDEMIA: Status: ACTIVE | Noted: 2018-08-16

## 2023-07-05 PROBLEM — Z00.00 ANNUAL PHYSICAL EXAM: Status: ACTIVE | Noted: 2018-05-03

## 2023-07-05 PROBLEM — J30.89 NON-SEASONAL ALLERGIC RHINITIS: Status: ACTIVE | Noted: 2019-02-25

## 2023-07-05 PROBLEM — E03.9 ACQUIRED HYPOTHYROIDISM: Status: ACTIVE | Noted: 2018-03-18

## 2023-07-05 PROBLEM — E66.811 CLASS 1 OBESITY DUE TO EXCESS CALORIES WITHOUT SERIOUS COMORBIDITY WITH BODY MASS INDEX (BMI) OF 30.0 TO 30.9 IN ADULT: Status: ACTIVE | Noted: 2018-04-05

## 2023-07-05 PROBLEM — E66.09 CLASS 1 OBESITY DUE TO EXCESS CALORIES WITHOUT SERIOUS COMORBIDITY WITH BODY MASS INDEX (BMI) OF 30.0 TO 30.9 IN ADULT: Status: ACTIVE | Noted: 2018-04-05

## 2023-07-05 PROBLEM — M05.79 RHEUMATOID ARTHRITIS INVOLVING MULTIPLE SITES WITH POSITIVE RHEUMATOID FACTOR (HCC): Status: ACTIVE | Noted: 2018-03-19

## 2023-07-06 NOTE — PROGRESS NOTES
HPI:   61 y.o.  presents for comprehensive annual healthcare examination and follow up appointment. No hospital, ER or specialist visits since last primary care visit except as noted below. He is followed regularly for hypertension, hyperlipidemia, obesity, allergic rhinitis, rheumatoid arthritis and other multimedical problems. He is taking his medications and follows diet try and remain physically active. Currently he denies any chest pain, shortness of breath or cardiorespiratory complaints. There are no GI or  complaints. He notes no headaches, dizziness or neurologic complaints. There are no current active arthritic complaints and he has no other complaints on complete review of systems. Patient Active Problem List   Diagnosis    Rheumatoid arthritis involving multiple sites with positive rheumatoid factor (HCC)    Right lower quadrant abdominal pain    Non-seasonal allergic rhinitis    Fatigue    Acquired hypothyroidism    Essential hypertension    Allergic dermatitis    Dyslipidemia    Class 1 obesity due to excess calories without serious comorbidity with body mass index (BMI) of 30.0 to 30.9 in adult    Annual physical exam        Past Medical History:   Diagnosis Date    DDD (degenerative disc disease), lumbar     Elevated BP     1/20/17 on no meds at this time    Hypothyroid     WANDA on CPAP     RA (rheumatoid arthritis) ([facility])        Social History     Tobacco Use    Smoking status: Never    Smokeless tobacco: Never   Substance Use Topics    Alcohol use: No    Drug use: No       Family History   Problem Relation Age of Onset    Hypertension Mother            Allergies   Allergen Reactions    Sulfa Antibiotics Nausea And Vomiting       ROS:     Constitutional: He denies fevers, weight loss, sweats. Eyes: No blurred or double vision. ENT: No difficulty with swallowing, taste, speech or smell.   Neck: no stiffness or swelling  Respiratory: No cough wheezing or shortness of

## 2023-07-07 ENCOUNTER — OFFICE VISIT (OUTPATIENT)
Facility: CLINIC | Age: 58
End: 2023-07-07
Payer: COMMERCIAL

## 2023-07-07 VITALS
BODY MASS INDEX: 32.14 KG/M2 | RESPIRATION RATE: 16 BRPM | OXYGEN SATURATION: 99 % | WEIGHT: 224.5 LBS | SYSTOLIC BLOOD PRESSURE: 122 MMHG | HEIGHT: 70 IN | TEMPERATURE: 98.2 F | HEART RATE: 81 BPM | DIASTOLIC BLOOD PRESSURE: 82 MMHG

## 2023-07-07 DIAGNOSIS — Z00.00 ANNUAL PHYSICAL EXAM: Primary | ICD-10-CM

## 2023-07-07 DIAGNOSIS — E78.5 DYSLIPIDEMIA: ICD-10-CM

## 2023-07-07 DIAGNOSIS — J30.89 NON-SEASONAL ALLERGIC RHINITIS, UNSPECIFIED TRIGGER: ICD-10-CM

## 2023-07-07 DIAGNOSIS — E03.9 ACQUIRED HYPOTHYROIDISM: ICD-10-CM

## 2023-07-07 DIAGNOSIS — M05.79 RHEUMATOID ARTHRITIS INVOLVING MULTIPLE SITES WITH POSITIVE RHEUMATOID FACTOR (HCC): ICD-10-CM

## 2023-07-07 DIAGNOSIS — I10 ESSENTIAL HYPERTENSION: ICD-10-CM

## 2023-07-07 DIAGNOSIS — E66.09 CLASS 1 OBESITY DUE TO EXCESS CALORIES WITHOUT SERIOUS COMORBIDITY WITH BODY MASS INDEX (BMI) OF 30.0 TO 30.9 IN ADULT: ICD-10-CM

## 2023-07-07 PROCEDURE — 3079F DIAST BP 80-89 MM HG: CPT | Performed by: INTERNAL MEDICINE

## 2023-07-07 PROCEDURE — 99396 PREV VISIT EST AGE 40-64: CPT | Performed by: INTERNAL MEDICINE

## 2023-07-07 PROCEDURE — 3074F SYST BP LT 130 MM HG: CPT | Performed by: INTERNAL MEDICINE

## 2023-07-08 LAB
ALBUMIN SERPL-MCNC: 4.7 G/DL (ref 3.5–5)
ALBUMIN/GLOB SERPL: 1.5 (ref 1.1–2.2)
ALP SERPL-CCNC: 66 U/L (ref 45–117)
ALT SERPL-CCNC: 45 U/L (ref 12–78)
ANION GAP SERPL CALC-SCNC: 5 MMOL/L (ref 5–15)
APPEARANCE UR: CLEAR
AST SERPL-CCNC: 22 U/L (ref 15–37)
BACTERIA URNS QL MICRO: NEGATIVE /HPF
BASOPHILS # BLD: 0 K/UL (ref 0–0.1)
BASOPHILS NFR BLD: 1 % (ref 0–1)
BILIRUB SERPL-MCNC: 0.7 MG/DL (ref 0.2–1)
BILIRUB UR QL: NEGATIVE
BUN SERPL-MCNC: 16 MG/DL (ref 6–20)
BUN/CREAT SERPL: 15 (ref 12–20)
CALCIUM SERPL-MCNC: 9.8 MG/DL (ref 8.5–10.1)
CHLORIDE SERPL-SCNC: 102 MMOL/L (ref 97–108)
CHOLEST SERPL-MCNC: 203 MG/DL
CK SERPL-CCNC: 105 U/L (ref 39–308)
CO2 SERPL-SCNC: 29 MMOL/L (ref 21–32)
COLOR UR: NORMAL
COMMENT:: NORMAL
CREAT SERPL-MCNC: 1.1 MG/DL (ref 0.7–1.3)
DIFFERENTIAL METHOD BLD: NORMAL
EOSINOPHIL # BLD: 0.1 K/UL (ref 0–0.4)
EOSINOPHIL NFR BLD: 1 % (ref 0–7)
EPITH CASTS URNS QL MICRO: NORMAL /LPF
ERYTHROCYTE [DISTWIDTH] IN BLOOD BY AUTOMATED COUNT: 12.5 % (ref 11.5–14.5)
GLOBULIN SER CALC-MCNC: 3.2 G/DL (ref 2–4)
GLUCOSE SERPL-MCNC: 101 MG/DL (ref 65–100)
GLUCOSE UR STRIP.AUTO-MCNC: NEGATIVE MG/DL
HCT VFR BLD AUTO: 47.9 % (ref 36.6–50.3)
HDLC SERPL-MCNC: 39 MG/DL
HDLC SERPL: 5.2 (ref 0–5)
HGB BLD-MCNC: 15.5 G/DL (ref 12.1–17)
HGB UR QL STRIP: NEGATIVE
HYALINE CASTS URNS QL MICRO: NORMAL /LPF (ref 0–5)
IMM GRANULOCYTES # BLD AUTO: 0 K/UL (ref 0–0.04)
IMM GRANULOCYTES NFR BLD AUTO: 0 % (ref 0–0.5)
KETONES UR QL STRIP.AUTO: NEGATIVE MG/DL
LDLC SERPL CALC-MCNC: 137.6 MG/DL (ref 0–100)
LEUKOCYTE ESTERASE UR QL STRIP.AUTO: NEGATIVE
LYMPHOCYTES # BLD: 1.7 K/UL (ref 0.8–3.5)
LYMPHOCYTES NFR BLD: 24 % (ref 12–49)
MCH RBC QN AUTO: 31 PG (ref 26–34)
MCHC RBC AUTO-ENTMCNC: 32.4 G/DL (ref 30–36.5)
MCV RBC AUTO: 95.8 FL (ref 80–99)
MONOCYTES # BLD: 0.5 K/UL (ref 0–1)
MONOCYTES NFR BLD: 8 % (ref 5–13)
NEUTS SEG # BLD: 4.6 K/UL (ref 1.8–8)
NEUTS SEG NFR BLD: 66 % (ref 32–75)
NITRITE UR QL STRIP.AUTO: NEGATIVE
NRBC # BLD: 0 K/UL (ref 0–0.01)
NRBC BLD-RTO: 0 PER 100 WBC
PH UR STRIP: 5.5 (ref 5–8)
PLATELET # BLD AUTO: 294 K/UL (ref 150–400)
PMV BLD AUTO: 10.4 FL (ref 8.9–12.9)
POTASSIUM SERPL-SCNC: 5 MMOL/L (ref 3.5–5.1)
PROT SERPL-MCNC: 7.9 G/DL (ref 6.4–8.2)
PROT UR STRIP-MCNC: NEGATIVE MG/DL
PSA SERPL-MCNC: 0.7 NG/ML (ref 0.01–4)
RBC # BLD AUTO: 5 M/UL (ref 4.1–5.7)
RBC #/AREA URNS HPF: NORMAL /HPF (ref 0–5)
SODIUM SERPL-SCNC: 136 MMOL/L (ref 136–145)
SP GR UR REFRACTOMETRY: 1 (ref 1–1.03)
SPECIMEN HOLD: NORMAL
T4 FREE SERPL-MCNC: 1 NG/DL (ref 0.8–1.5)
TRIGL SERPL-MCNC: 132 MG/DL
TSH SERPL DL<=0.05 MIU/L-ACNC: 1.86 UIU/ML (ref 0.36–3.74)
UROBILINOGEN UR QL STRIP.AUTO: 0.2 EU/DL (ref 0.2–1)
VLDLC SERPL CALC-MCNC: 26.4 MG/DL
WBC # BLD AUTO: 6.9 K/UL (ref 4.1–11.1)
WBC URNS QL MICRO: NORMAL /HPF (ref 0–4)

## 2023-07-31 RX ORDER — LISINOPRIL 20 MG/1
TABLET ORAL DAILY
Qty: 90 TABLET | Refills: 1 | Status: SHIPPED | OUTPATIENT
Start: 2023-07-31

## 2023-07-31 RX ORDER — LEVOTHYROXINE SODIUM 0.05 MG/1
TABLET ORAL
Qty: 90 TABLET | Refills: 1 | Status: SHIPPED | OUTPATIENT
Start: 2023-07-31

## 2023-07-31 NOTE — TELEPHONE ENCOUNTER
RX refill request from the patient/pharmacy. Patient last seen 07- with labs, and next appt. scheduled for 01-  Requested Prescriptions     Pending Prescriptions Disp Refills    levothyroxine (SYNTHROID) 50 MCG tablet [Pharmacy Med Name: LEVOTHYROXINE 0.05MG (50MCG) TAB] 90 tablet 1     Sig: TAKE 1 TABLET BY MOUTH EVERY DAY    lisinopril (PRINIVIL;ZESTRIL) 20 MG tablet [Pharmacy Med Name: LISINOPRIL 20MG TABLETS] 90 tablet 1     Sig: TAKE 1 TABLET BY MOUTH DAILY    .

## 2023-08-04 PROBLEM — Z00.00 ANNUAL PHYSICAL EXAM: Status: RESOLVED | Noted: 2018-05-03 | Resolved: 2023-08-04

## 2023-12-27 RX ORDER — DIAPER,BRIEF,INFANT-TODD,DISP
10 EACH MISCELLANEOUS DAILY
Qty: 90 TABLET | Refills: 1 | Status: SHIPPED | OUTPATIENT
Start: 2023-12-27

## 2023-12-27 NOTE — TELEPHONE ENCOUNTER
PCP: Sander Do MD    Last appt: 7/7/2023    Future Appointments   Date Time Provider 4600  [address]   1/8/2024  8:10 AM Sander Do MD [facility] BS AMB       Requested Prescriptions     Pending Prescriptions Disp Refills    WAL-ITIN 10 MG tablet [Pharmacy Med Name: WAL-ITIN 10MG TABLETS] 90 tablet 1     Sig: TAKE 1 TABLET BY MOUTH DAILY

## 2024-01-08 ENCOUNTER — OFFICE VISIT (OUTPATIENT)
Facility: CLINIC | Age: 59
End: 2024-01-08
Payer: COMMERCIAL

## 2024-01-08 VITALS
HEART RATE: 77 BPM | OXYGEN SATURATION: 98 % | BODY MASS INDEX: 35.43 KG/M2 | SYSTOLIC BLOOD PRESSURE: 113 MMHG | DIASTOLIC BLOOD PRESSURE: 79 MMHG | TEMPERATURE: 98 F | WEIGHT: 246.9 LBS

## 2024-01-08 DIAGNOSIS — M05.79 RHEUMATOID ARTHRITIS INVOLVING MULTIPLE SITES WITH POSITIVE RHEUMATOID FACTOR (HCC): ICD-10-CM

## 2024-01-08 DIAGNOSIS — E78.5 DYSLIPIDEMIA: ICD-10-CM

## 2024-01-08 DIAGNOSIS — J30.89 NON-SEASONAL ALLERGIC RHINITIS, UNSPECIFIED TRIGGER: ICD-10-CM

## 2024-01-08 DIAGNOSIS — E66.09 CLASS 1 OBESITY DUE TO EXCESS CALORIES WITHOUT SERIOUS COMORBIDITY WITH BODY MASS INDEX (BMI) OF 30.0 TO 30.9 IN ADULT: ICD-10-CM

## 2024-01-08 DIAGNOSIS — I10 ESSENTIAL HYPERTENSION: Primary | ICD-10-CM

## 2024-01-08 LAB
ALBUMIN SERPL-MCNC: 4.6 G/DL (ref 3.5–5)
ALBUMIN/GLOB SERPL: 1.4 (ref 1.1–2.2)
ALP SERPL-CCNC: 64 U/L (ref 45–117)
ALT SERPL-CCNC: 69 U/L (ref 12–78)
ANION GAP SERPL CALC-SCNC: 6 MMOL/L (ref 5–15)
AST SERPL-CCNC: 33 U/L (ref 15–37)
BILIRUB SERPL-MCNC: 0.7 MG/DL (ref 0.2–1)
BUN SERPL-MCNC: 20 MG/DL (ref 6–20)
BUN/CREAT SERPL: 18 (ref 12–20)
CALCIUM SERPL-MCNC: 9.3 MG/DL (ref 8.5–10.1)
CHLORIDE SERPL-SCNC: 102 MMOL/L (ref 97–108)
CHOLEST SERPL-MCNC: 213 MG/DL
CK SERPL-CCNC: 263 U/L (ref 39–308)
CO2 SERPL-SCNC: 26 MMOL/L (ref 21–32)
CREAT SERPL-MCNC: 1.1 MG/DL (ref 0.7–1.3)
GLOBULIN SER CALC-MCNC: 3.2 G/DL (ref 2–4)
GLUCOSE SERPL-MCNC: 104 MG/DL (ref 65–100)
HDLC SERPL-MCNC: 42 MG/DL
HDLC SERPL: 5.1 (ref 0–5)
LDLC SERPL CALC-MCNC: 144.2 MG/DL (ref 0–100)
POTASSIUM SERPL-SCNC: 4.6 MMOL/L (ref 3.5–5.1)
PROT SERPL-MCNC: 7.8 G/DL (ref 6.4–8.2)
SODIUM SERPL-SCNC: 134 MMOL/L (ref 136–145)
TRIGL SERPL-MCNC: 134 MG/DL
VLDLC SERPL CALC-MCNC: 26.8 MG/DL

## 2024-01-08 PROCEDURE — 99214 OFFICE O/P EST MOD 30 MIN: CPT | Performed by: INTERNAL MEDICINE

## 2024-01-08 PROCEDURE — 3078F DIAST BP <80 MM HG: CPT | Performed by: INTERNAL MEDICINE

## 2024-01-08 PROCEDURE — 3074F SYST BP LT 130 MM HG: CPT | Performed by: INTERNAL MEDICINE

## 2024-01-08 NOTE — PROGRESS NOTES
Chief Complaint   Patient presents with    Follow-up    Hypertension     6 month followup    1. Have you been to the ER, urgent care clinic since your last visit?  Hospitalized since your last visit?no    2. Have you seen or consulted any other health care providers outside of the [facility] since your last visit?  Include any pap smears or colon screening. no   
edema  GASTROINTESTINAL: Abdomen: non distended, soft, non tender, bowel sounds normal  HEMATOLOGIC: no purpura, petechiae or bruising  LYMPHATIC: No lymph node enlargemant  MUSCULOSKELETAL: Extremities: no active synovitis, pulse 1+   INTEGUMENT: No unusual rashes or suspicious skin lesions noted. Nails appear normal.  PERIPHERAL VASCULAR: normal pulses femoral, PT and DP  NEUROLOGIC: non-focal exam, A & O X 3  PSYCHIATRIC:, appropriate affect     ASSESSMENT:   1. Essential hypertension    2. Rheumatoid arthritis involving multiple sites with positive rheumatoid factor (HCC)    3. Dyslipidemia    4. Non-seasonal allergic rhinitis, unspecified trigger    5. Class 1 obesity due to excess calories without serious comorbidity with body mass index (BMI) of 30.0 to 30.9 in adult      Impression  1.  Hypertension that is controlled so continue current therapy reviewed with him.  2.  Rheumatoid arthritis on Enbrel we will leave that treatment to his rheumatologist  3.  Dyslipidemia prior lab reviewed repeat status is pending  4 allergic rhinitis stable  5 obesity I did discuss diet, exercise weight reduction for overall health benefit.  Follow-up again 6 months or sooner if there is a problem.  I will call with today's lab results.    PLAN:  1. Essential hypertension  2. Rheumatoid arthritis involving multiple sites with positive rheumatoid factor (HCC)  3. Dyslipidemia  -     CK; Future  -     Lipid Panel; Future  -     Comprehensive Metabolic Panel; Future  4. Non-seasonal allergic rhinitis, unspecified trigger  5. Class 1 obesity due to excess calories without serious comorbidity with body mass index (BMI) of 30.0 to 30.9 in adult          ATTENTION:   This medical record was transcribed using an electronic medical records system.  Although proofread, it may and can contain electronic and spelling errors.  Other human spelling and other errors may be present.  Corrections may be executed at a later time.  Please feel

## 2024-01-26 RX ORDER — FLUTICASONE PROPIONATE 50 MCG
SPRAY, SUSPENSION (ML) NASAL
Qty: 48 G | Refills: 1 | Status: SHIPPED | OUTPATIENT
Start: 2024-01-26

## 2024-01-26 RX ORDER — LEVOTHYROXINE SODIUM 0.05 MG/1
TABLET ORAL
Qty: 90 TABLET | Refills: 1 | Status: SHIPPED | OUTPATIENT
Start: 2024-01-26

## 2024-01-26 RX ORDER — LISINOPRIL 20 MG/1
TABLET ORAL DAILY
Qty: 90 TABLET | Refills: 1 | Status: SHIPPED | OUTPATIENT
Start: 2024-01-26

## 2024-01-26 NOTE — TELEPHONE ENCOUNTER
PCP: Michael Cruz MD    Last appt: 1/8/2024    Future Appointments   Date Time Provider Department Center   7/8/2024  8:10 AM Michael Cruz MD [facility] BS AMB       Requested Prescriptions     Pending Prescriptions Disp Refills    lisinopril (PRINIVIL;ZESTRIL) 20 MG tablet [Pharmacy Med Name: LISINOPRIL 20MG TABLETS] 90 tablet 1     Sig: TAKE 1 TABLET BY MOUTH DAILY    fluticasone (FLONASE) 50 MCG/ACT nasal spray [Pharmacy Med Name: FLUTICASONE 50MCG NASAL SP (120) RX] 48 g 1     Sig: SHAKE LIQUID AND USE 2 SPRAYS IN EACH NOSTRIL AT BEDTIME    levothyroxine (SYNTHROID) 50 MCG tablet [Pharmacy Med Name: LEVOTHYROXINE 0.05MG (50MCG) TAB] 90 tablet 1     Sig: TAKE 1 TABLET BY MOUTH EVERY DAY

## 2024-07-05 NOTE — PROGRESS NOTES
HPI:   58 y.o.  presents for comprehensive annual healthcare examination and follow up appointment.  No hospital, ER or specialist visits since last primary care visit except as noted below.  He is also in follow-up of his medical problems include hypertension, hyperlipidemia, DJD, hypothyroidism, rheumatoid arthritis, obesity and other multimedical problems.  He is taking his medication and trying to follow his diet and trying to get some physical activity but notes he could do better with diet and exercise.  He currently notes no chest pain, shortness of breath or cardiac respiratory complaints.  There are no current GI or  complaints.  He has no headaches, dizziness or neurologic complaints.  He has no current active arthritic complaints and there are no other complaints on complete review of systems.    Patient Active Problem List   Diagnosis    Rheumatoid arthritis involving multiple sites with positive rheumatoid factor (HCC)    Right lower quadrant abdominal pain    Non-seasonal allergic rhinitis    Fatigue    Acquired hypothyroidism    Essential hypertension    Allergic dermatitis    Dyslipidemia    Class 1 obesity due to excess calories without serious comorbidity with body mass index (BMI) of 30.0 to 30.9 in adult    Annual physical exam        Past Medical History:   Diagnosis Date    DDD (degenerative disc disease), lumbar     Elevated BP     1/20/17 on no meds at this time    Hypothyroid     WANDA on CPAP     RA (rheumatoid arthritis) ([facility])        Social History     Tobacco Use    Smoking status: Never    Smokeless tobacco: Never   Substance Use Topics    Alcohol use: No    Drug use: No       Family History   Problem Relation Age of Onset    Hypertension Mother            Allergies   Allergen Reactions    Sulfa Antibiotics Nausea And Vomiting       ROS:     Constitutional: He denies fevers, weight loss, sweats.  Eyes: No blurred or double vision.  ENT: No difficulty with swallowing, taste, speech or

## 2024-07-08 ENCOUNTER — OFFICE VISIT (OUTPATIENT)
Facility: CLINIC | Age: 59
End: 2024-07-08
Payer: COMMERCIAL

## 2024-07-08 VITALS
TEMPERATURE: 97.2 F | BODY MASS INDEX: 35.36 KG/M2 | HEIGHT: 70 IN | RESPIRATION RATE: 18 BRPM | WEIGHT: 247 LBS | DIASTOLIC BLOOD PRESSURE: 78 MMHG | HEART RATE: 97 BPM | SYSTOLIC BLOOD PRESSURE: 115 MMHG | OXYGEN SATURATION: 98 %

## 2024-07-08 DIAGNOSIS — E78.5 DYSLIPIDEMIA: ICD-10-CM

## 2024-07-08 DIAGNOSIS — E03.9 ACQUIRED HYPOTHYROIDISM: ICD-10-CM

## 2024-07-08 DIAGNOSIS — Z00.00 ANNUAL PHYSICAL EXAM: Primary | ICD-10-CM

## 2024-07-08 DIAGNOSIS — M05.79 RHEUMATOID ARTHRITIS INVOLVING MULTIPLE SITES WITH POSITIVE RHEUMATOID FACTOR (HCC): ICD-10-CM

## 2024-07-08 DIAGNOSIS — I10 ESSENTIAL HYPERTENSION: ICD-10-CM

## 2024-07-08 DIAGNOSIS — J30.89 NON-SEASONAL ALLERGIC RHINITIS, UNSPECIFIED TRIGGER: ICD-10-CM

## 2024-07-08 DIAGNOSIS — E66.09 CLASS 1 OBESITY DUE TO EXCESS CALORIES WITHOUT SERIOUS COMORBIDITY WITH BODY MASS INDEX (BMI) OF 30.0 TO 30.9 IN ADULT: ICD-10-CM

## 2024-07-08 LAB
ALBUMIN SERPL-MCNC: 4.4 G/DL (ref 3.5–5)
ALBUMIN/GLOB SERPL: 1.3 (ref 1.1–2.2)
ALP SERPL-CCNC: 66 U/L (ref 45–117)
ALT SERPL-CCNC: 73 U/L (ref 12–78)
ANION GAP SERPL CALC-SCNC: 8 MMOL/L (ref 5–15)
APPEARANCE UR: CLEAR
AST SERPL-CCNC: 32 U/L (ref 15–37)
BACTERIA URNS QL MICRO: NEGATIVE /HPF
BASOPHILS # BLD: 0 K/UL (ref 0–0.1)
BASOPHILS NFR BLD: 1 % (ref 0–1)
BILIRUB SERPL-MCNC: 0.6 MG/DL (ref 0.2–1)
BILIRUB UR QL: NEGATIVE
BUN SERPL-MCNC: 20 MG/DL (ref 6–20)
BUN/CREAT SERPL: 16 (ref 12–20)
CALCIUM SERPL-MCNC: 9.4 MG/DL (ref 8.5–10.1)
CHLORIDE SERPL-SCNC: 102 MMOL/L (ref 97–108)
CHOLEST SERPL-MCNC: 206 MG/DL
CK SERPL-CCNC: 137 U/L (ref 39–308)
CO2 SERPL-SCNC: 26 MMOL/L (ref 21–32)
COLOR UR: NORMAL
CREAT SERPL-MCNC: 1.22 MG/DL (ref 0.7–1.3)
DIFFERENTIAL METHOD BLD: ABNORMAL
EOSINOPHIL # BLD: 0.2 K/UL (ref 0–0.4)
EOSINOPHIL NFR BLD: 2 % (ref 0–7)
EPITH CASTS URNS QL MICRO: NORMAL /LPF
ERYTHROCYTE [DISTWIDTH] IN BLOOD BY AUTOMATED COUNT: 12.3 % (ref 11.5–14.5)
GLOBULIN SER CALC-MCNC: 3.3 G/DL (ref 2–4)
GLUCOSE SERPL-MCNC: 117 MG/DL (ref 65–100)
GLUCOSE UR STRIP.AUTO-MCNC: NEGATIVE MG/DL
HCT VFR BLD AUTO: 47.8 % (ref 36.6–50.3)
HDLC SERPL-MCNC: 45 MG/DL
HDLC SERPL: 4.6 (ref 0–5)
HGB BLD-MCNC: 16 G/DL (ref 12.1–17)
HGB UR QL STRIP: NEGATIVE
HYALINE CASTS URNS QL MICRO: NORMAL /LPF (ref 0–5)
IMM GRANULOCYTES # BLD AUTO: 0.1 K/UL (ref 0–0.04)
IMM GRANULOCYTES NFR BLD AUTO: 1 % (ref 0–0.5)
KETONES UR QL STRIP.AUTO: NEGATIVE MG/DL
LDLC SERPL CALC-MCNC: 127.8 MG/DL (ref 0–100)
LEUKOCYTE ESTERASE UR QL STRIP.AUTO: NEGATIVE
LYMPHOCYTES # BLD: 2.7 K/UL (ref 0.8–3.5)
LYMPHOCYTES NFR BLD: 30 % (ref 12–49)
MCH RBC QN AUTO: 31.9 PG (ref 26–34)
MCHC RBC AUTO-ENTMCNC: 33.5 G/DL (ref 30–36.5)
MCV RBC AUTO: 95.4 FL (ref 80–99)
MONOCYTES # BLD: 0.7 K/UL (ref 0–1)
MONOCYTES NFR BLD: 8 % (ref 5–13)
NEUTS SEG # BLD: 5.1 K/UL (ref 1.8–8)
NEUTS SEG NFR BLD: 58 % (ref 32–75)
NITRITE UR QL STRIP.AUTO: NEGATIVE
NRBC # BLD: 0 K/UL (ref 0–0.01)
NRBC BLD-RTO: 0 PER 100 WBC
PH UR STRIP: 5.5 (ref 5–8)
PLATELET # BLD AUTO: 290 K/UL (ref 150–400)
PMV BLD AUTO: 10.1 FL (ref 8.9–12.9)
POTASSIUM SERPL-SCNC: 4.8 MMOL/L (ref 3.5–5.1)
PROT SERPL-MCNC: 7.7 G/DL (ref 6.4–8.2)
PROT UR STRIP-MCNC: NEGATIVE MG/DL
PSA SERPL-MCNC: 0.5 NG/ML (ref 0.01–4)
RBC # BLD AUTO: 5.01 M/UL (ref 4.1–5.7)
RBC #/AREA URNS HPF: NORMAL /HPF (ref 0–5)
SODIUM SERPL-SCNC: 136 MMOL/L (ref 136–145)
SP GR UR REFRACTOMETRY: 1 (ref 1–1.03)
T4 FREE SERPL-MCNC: 1 NG/DL (ref 0.8–1.5)
TRIGL SERPL-MCNC: 166 MG/DL
TSH SERPL DL<=0.05 MIU/L-ACNC: 2.33 UIU/ML (ref 0.36–3.74)
UROBILINOGEN UR QL STRIP.AUTO: 0.2 EU/DL (ref 0.2–1)
VLDLC SERPL CALC-MCNC: 33.2 MG/DL
WBC # BLD AUTO: 8.8 K/UL (ref 4.1–11.1)
WBC URNS QL MICRO: NORMAL /HPF (ref 0–4)

## 2024-07-08 PROCEDURE — 3078F DIAST BP <80 MM HG: CPT | Performed by: INTERNAL MEDICINE

## 2024-07-08 PROCEDURE — 99396 PREV VISIT EST AGE 40-64: CPT | Performed by: INTERNAL MEDICINE

## 2024-07-08 PROCEDURE — 3074F SYST BP LT 130 MM HG: CPT | Performed by: INTERNAL MEDICINE

## 2024-07-08 NOTE — PROGRESS NOTES
Marquise Garcia is a 58 y.o. male     Chief Complaint   Patient presents with    Annual Exam       /78 (Site: Left Upper Arm, Position: Sitting, Cuff Size: Large Adult)   Pulse 97   Temp 97.2 °F (36.2 °C) (Temporal)   Resp 18   Ht 1.778 m (5' 10\")   Wt 112 kg (247 lb)   SpO2 98%   BMI 35.44 kg/m²     Health Maintenance Due   Topic Date Due    Hepatitis B vaccine (1 of 3 - 3-dose series) Never done    HIV screen  Never done    DTaP/Tdap/Td vaccine (1 - Tdap) Never done    COVID-19 Vaccine (6 - 2023-24 season) 12/08/2023    Depression Screen  01/09/2024         \"Have you been to the ER, urgent care clinic since your last visit?  Hospitalized since your last visit?\"    NO    “Have you seen or consulted any other health care providers outside of [facility] since your last visit?”    NO

## 2024-07-23 RX ORDER — LEVOTHYROXINE SODIUM 0.05 MG/1
TABLET ORAL
Qty: 90 TABLET | Refills: 3 | Status: SHIPPED | OUTPATIENT
Start: 2024-07-23

## 2024-07-23 RX ORDER — LISINOPRIL 20 MG/1
TABLET ORAL DAILY
Qty: 90 TABLET | Refills: 3 | Status: SHIPPED | OUTPATIENT
Start: 2024-07-23

## 2024-07-23 NOTE — TELEPHONE ENCOUNTER
RX refill request from the patient/pharmacy. Patient last seen 07- with labs, and next appt. scheduled for 01-  Requested Prescriptions     Pending Prescriptions Disp Refills    lisinopril (PRINIVIL;ZESTRIL) 20 MG tablet [Pharmacy Med Name: LISINOPRIL 20MG TABLETS] 90 tablet 3     Sig: TAKE 1 TABLET BY MOUTH DAILY    levothyroxine (SYNTHROID) 50 MCG tablet [Pharmacy Med Name: LEVOTHYROXINE 0.05MG (50MCG) TAB] 90 tablet 3     Sig: TAKE 1 TABLET BY MOUTH EVERY DAY    .

## 2024-08-04 PROBLEM — Z00.00 ANNUAL PHYSICAL EXAM: Status: RESOLVED | Noted: 2018-05-03 | Resolved: 2024-08-04

## 2024-11-19 RX ORDER — CYCLOBENZAPRINE HCL 10 MG
10 TABLET ORAL 3 TIMES DAILY PRN
Qty: 30 TABLET | Refills: 0 | Status: SHIPPED | OUTPATIENT
Start: 2024-11-19

## 2024-11-19 NOTE — TELEPHONE ENCOUNTER
PCP: Michael Cruz MD    Last appt: 7/8/2024    Future Appointments   Date Time Provider Department Center   1/13/2025  8:00 AM Michael Cruz MD [facility] DEP       Requested Prescriptions     Pending Prescriptions Disp Refills    cyclobenzaprine (FLEXERIL) 10 MG tablet [Pharmacy Med Name: CYCLOBENZAPRINE 10MG TABLETS] 30 tablet      Sig: TAKE 1 TABLET BY MOUTH THREE TIMES DAILY AS NEEDED FOR MUSCLE SPASMS

## 2024-11-22 RX ORDER — FLUTICASONE PROPIONATE 50 MCG
SPRAY, SUSPENSION (ML) NASAL
Qty: 48 G | Refills: 1 | Status: SHIPPED | OUTPATIENT
Start: 2024-11-22

## 2024-11-22 NOTE — TELEPHONE ENCOUNTER
PCP: Michael Cruz MD    Last appt: 7/8/2024    Future Appointments   Date Time Provider Department Center   1/13/2025  8:00 AM Michael Cruz MD [facility] DEP       Requested Prescriptions     Pending Prescriptions Disp Refills    fluticasone (FLONASE) 50 MCG/ACT nasal spray [Pharmacy Med Name: FLUTICASONE 50MCG NASAL SP (120) RX] 48 g 1     Sig: SHAKE LIQUID AND USE 2 SPRAYS IN EACH NOSTRIL AT BEDTIME

## 2025-01-10 SDOH — ECONOMIC STABILITY: TRANSPORTATION INSECURITY
IN THE PAST 12 MONTHS, HAS LACK OF TRANSPORTATION KEPT YOU FROM MEETINGS, WORK, OR FROM GETTING THINGS NEEDED FOR DAILY LIVING?: NO

## 2025-01-10 SDOH — ECONOMIC STABILITY: FOOD INSECURITY: WITHIN THE PAST 12 MONTHS, YOU WORRIED THAT YOUR FOOD WOULD RUN OUT BEFORE YOU GOT MONEY TO BUY MORE.: NEVER TRUE

## 2025-01-10 SDOH — ECONOMIC STABILITY: TRANSPORTATION INSECURITY
IN THE PAST 12 MONTHS, HAS THE LACK OF TRANSPORTATION KEPT YOU FROM MEDICAL APPOINTMENTS OR FROM GETTING MEDICATIONS?: NO

## 2025-01-10 SDOH — ECONOMIC STABILITY: INCOME INSECURITY: IN THE LAST 12 MONTHS, WAS THERE A TIME WHEN YOU WERE NOT ABLE TO PAY THE MORTGAGE OR RENT ON TIME?: NO

## 2025-01-10 SDOH — ECONOMIC STABILITY: FOOD INSECURITY: WITHIN THE PAST 12 MONTHS, THE FOOD YOU BOUGHT JUST DIDN'T LAST AND YOU DIDN'T HAVE MONEY TO GET MORE.: NEVER TRUE

## 2025-01-12 NOTE — PROGRESS NOTES
Chief Complaint   Patient presents with    Hypertension     6 month f/u    Hypothyroidism       SUBJECTIVE:    Marquise Garcia is a 59 y.o. male who returns in follow-up for his medical problems include hypertension, hyperlipidemia, allergic rhinitis, obesity, rheumatoid arthritis and other medical problems.  He is taking his medications trying to follow his diet remains physically active.  Currently he notes no chest pain, shortness of breath or cardiac respiratory complaints.  There are no current active GI or  complaints.  He notes no headaches, dizziness or neurologic complaints.  He has no current active arthritic complaints but does have his chronic joint aches and pains.  He has no other complaints on complete review of systems.      Current Outpatient Medications   Medication Sig Dispense Refill    fluticasone (FLONASE) 50 MCG/ACT nasal spray SHAKE LIQUID AND USE 2 SPRAYS IN EACH NOSTRIL AT BEDTIME 48 g 1    cyclobenzaprine (FLEXERIL) 10 MG tablet TAKE 1 TABLET BY MOUTH THREE TIMES DAILY AS NEEDED FOR MUSCLE SPASMS 30 tablet 0    lisinopril (PRINIVIL;ZESTRIL) 20 MG tablet TAKE 1 TABLET BY MOUTH DAILY 90 tablet 3    levothyroxine (SYNTHROID) 50 MCG tablet TAKE 1 TABLET BY MOUTH EVERY DAY 90 tablet 3    WAL-ITIN 10 MG tablet TAKE 1 TABLET BY MOUTH DAILY 90 tablet 1    aspirin 81 MG EC tablet Take by mouth daily      Etanercept (ENBREL SURECLICK) 50 MG/ML SOAJ Inject 50 mg into the skin every 7 days       No current facility-administered medications for this visit.     Past Medical History:   Diagnosis Date    DDD (degenerative disc disease), lumbar     Elevated BP     1/20/17 on no meds at this time    Hypothyroid     WANDA on CPAP     RA (rheumatoid arthritis) (formerly [facility])      History reviewed. No pertinent surgical history.  Allergies   Allergen Reactions    Sulfa Antibiotics Nausea And Vomiting       REVIEW OF SYSTEMS:  General: negative for - chills or fever, or weight loss or gain  ENT: negative for -

## 2025-01-13 ENCOUNTER — OFFICE VISIT (OUTPATIENT)
Facility: CLINIC | Age: 60
End: 2025-01-13
Payer: COMMERCIAL

## 2025-01-13 VITALS
BODY MASS INDEX: 36.63 KG/M2 | TEMPERATURE: 97.8 F | HEART RATE: 81 BPM | SYSTOLIC BLOOD PRESSURE: 136 MMHG | HEIGHT: 70 IN | RESPIRATION RATE: 18 BRPM | DIASTOLIC BLOOD PRESSURE: 88 MMHG | OXYGEN SATURATION: 95 % | WEIGHT: 255.9 LBS

## 2025-01-13 DIAGNOSIS — M05.79 RHEUMATOID ARTHRITIS INVOLVING MULTIPLE SITES WITH POSITIVE RHEUMATOID FACTOR (HCC): ICD-10-CM

## 2025-01-13 DIAGNOSIS — E78.5 DYSLIPIDEMIA: ICD-10-CM

## 2025-01-13 DIAGNOSIS — J30.89 NON-SEASONAL ALLERGIC RHINITIS, UNSPECIFIED TRIGGER: ICD-10-CM

## 2025-01-13 DIAGNOSIS — E66.811 CLASS 1 OBESITY DUE TO EXCESS CALORIES WITHOUT SERIOUS COMORBIDITY WITH BODY MASS INDEX (BMI) OF 30.0 TO 30.9 IN ADULT: ICD-10-CM

## 2025-01-13 DIAGNOSIS — I10 ESSENTIAL HYPERTENSION: Primary | ICD-10-CM

## 2025-01-13 DIAGNOSIS — E66.09 CLASS 1 OBESITY DUE TO EXCESS CALORIES WITHOUT SERIOUS COMORBIDITY WITH BODY MASS INDEX (BMI) OF 30.0 TO 30.9 IN ADULT: ICD-10-CM

## 2025-01-13 PROCEDURE — 3079F DIAST BP 80-89 MM HG: CPT | Performed by: INTERNAL MEDICINE

## 2025-01-13 PROCEDURE — 99214 OFFICE O/P EST MOD 30 MIN: CPT | Performed by: INTERNAL MEDICINE

## 2025-01-13 PROCEDURE — 3075F SYST BP GE 130 - 139MM HG: CPT | Performed by: INTERNAL MEDICINE

## 2025-01-13 ASSESSMENT — PATIENT HEALTH QUESTIONNAIRE - PHQ9
SUM OF ALL RESPONSES TO PHQ QUESTIONS 1-9: 0
2. FEELING DOWN, DEPRESSED OR HOPELESS: NOT AT ALL
1. LITTLE INTEREST OR PLEASURE IN DOING THINGS: NOT AT ALL
SUM OF ALL RESPONSES TO PHQ QUESTIONS 1-9: 0
SUM OF ALL RESPONSES TO PHQ QUESTIONS 1-9: 0
SUM OF ALL RESPONSES TO PHQ9 QUESTIONS 1 & 2: 0
SUM OF ALL RESPONSES TO PHQ QUESTIONS 1-9: 0

## 2025-01-13 NOTE — PROGRESS NOTES
Marquise Garcia is a 59 y.o. male     Chief Complaint   Patient presents with    Hypertension     6 month f/u    Hypothyroidism       BP (!) 140/90 (Site: Left Upper Arm, Position: Sitting, Cuff Size: Large Adult)   Pulse 81   Temp 97.8 °F (36.6 °C) (Oral)   Resp 18   Ht 1.778 m (5' 10\")   Wt 116.1 kg (255 lb 14.4 oz)   SpO2 95%   BMI 36.72 kg/m²     Health Maintenance Due   Topic Date Due    Depression Screen  Never done    HIV screen  Never done    DTaP/Tdap/Td vaccine (1 - Tdap) Never done    Diabetes screen  Never done    Flu vaccine (1) 08/01/2024    COVID-19 Vaccine (6 - 2023-24 season) 09/01/2024         \"Have you been to the ER, urgent care clinic since your last visit?  Hospitalized since your last visit?\"    NO    “Have you seen or consulted any other health care providers outside of [facility] since your last visit?”    NO

## 2025-01-14 LAB
ALBUMIN SERPL-MCNC: 4.3 G/DL (ref 3.5–5)
ALBUMIN/GLOB SERPL: 1.2 (ref 1.1–2.2)
ALP SERPL-CCNC: 71 U/L (ref 45–117)
ALT SERPL-CCNC: 92 U/L (ref 12–78)
ANION GAP SERPL CALC-SCNC: 8 MMOL/L (ref 2–12)
AST SERPL-CCNC: 41 U/L (ref 15–37)
BILIRUB SERPL-MCNC: 0.6 MG/DL (ref 0.2–1)
BUN SERPL-MCNC: 22 MG/DL (ref 6–20)
BUN/CREAT SERPL: 16 (ref 12–20)
CALCIUM SERPL-MCNC: 9.9 MG/DL (ref 8.5–10.1)
CHLORIDE SERPL-SCNC: 102 MMOL/L (ref 97–108)
CHOLEST SERPL-MCNC: 206 MG/DL
CK SERPL-CCNC: 162 U/L (ref 39–308)
CO2 SERPL-SCNC: 24 MMOL/L (ref 21–32)
CREAT SERPL-MCNC: 1.35 MG/DL (ref 0.7–1.3)
GLOBULIN SER CALC-MCNC: 3.5 G/DL (ref 2–4)
GLUCOSE SERPL-MCNC: 141 MG/DL (ref 65–100)
HDLC SERPL-MCNC: 45 MG/DL
HDLC SERPL: 4.6 (ref 0–5)
LDLC SERPL CALC-MCNC: 127 MG/DL (ref 0–100)
POTASSIUM SERPL-SCNC: 5.1 MMOL/L (ref 3.5–5.1)
PROT SERPL-MCNC: 7.8 G/DL (ref 6.4–8.2)
SODIUM SERPL-SCNC: 134 MMOL/L (ref 136–145)
TRIGL SERPL-MCNC: 170 MG/DL
VLDLC SERPL CALC-MCNC: 34 MG/DL

## 2025-02-03 ENCOUNTER — LAB (OUTPATIENT)
Facility: CLINIC | Age: 60
End: 2025-02-03

## 2025-02-03 DIAGNOSIS — R79.89 ELEVATED LFTS: ICD-10-CM

## 2025-02-03 DIAGNOSIS — N28.9 ABNORMAL KIDNEY FUNCTION: Primary | ICD-10-CM

## 2025-02-03 LAB
ALBUMIN SERPL-MCNC: 4 G/DL (ref 3.5–5)
ALBUMIN/GLOB SERPL: 1.2 (ref 1.1–2.2)
ALP SERPL-CCNC: 69 U/L (ref 45–117)
ALT SERPL-CCNC: 75 U/L (ref 12–78)
ANION GAP SERPL CALC-SCNC: 10 MMOL/L (ref 2–12)
AST SERPL-CCNC: 38 U/L (ref 15–37)
BILIRUB SERPL-MCNC: 0.5 MG/DL (ref 0.2–1)
BUN SERPL-MCNC: 15 MG/DL (ref 6–20)
BUN/CREAT SERPL: 14 (ref 12–20)
CALCIUM SERPL-MCNC: 9.2 MG/DL (ref 8.5–10.1)
CHLORIDE SERPL-SCNC: 101 MMOL/L (ref 97–108)
CO2 SERPL-SCNC: 24 MMOL/L (ref 21–32)
CREAT SERPL-MCNC: 1.06 MG/DL (ref 0.7–1.3)
GLOBULIN SER CALC-MCNC: 3.3 G/DL (ref 2–4)
GLUCOSE SERPL-MCNC: 132 MG/DL (ref 65–100)
POTASSIUM SERPL-SCNC: 4.3 MMOL/L (ref 3.5–5.1)
PROT SERPL-MCNC: 7.3 G/DL (ref 6.4–8.2)
SODIUM SERPL-SCNC: 135 MMOL/L (ref 136–145)

## 2025-02-14 RX ORDER — CYCLOBENZAPRINE HCL 10 MG
10 TABLET ORAL 3 TIMES DAILY PRN
Qty: 30 TABLET | Refills: 0 | Status: SHIPPED | OUTPATIENT
Start: 2025-02-14

## 2025-02-14 NOTE — TELEPHONE ENCOUNTER
PCP: Michael Cruz MD    Last appt: 1/13/2025    Future Appointments   Date Time Provider Department Center   7/15/2025  8:00 AM Michael Cruz MD [facility] DEP       Requested Prescriptions     Pending Prescriptions Disp Refills    cyclobenzaprine (FLEXERIL) 10 MG tablet [Pharmacy Med Name: CYCLOBENZAPRINE 10MG TABLETS] 30 tablet 0     Sig: TAKE 1 TABLET BY MOUTH THREE TIMES DAILY AS NEEDED FOR MUSCLE SPASMS

## 2025-03-21 ENCOUNTER — OFFICE VISIT (OUTPATIENT)
Facility: CLINIC | Age: 60
End: 2025-03-21
Payer: COMMERCIAL

## 2025-03-21 VITALS
TEMPERATURE: 98.2 F | HEART RATE: 104 BPM | BODY MASS INDEX: 36.29 KG/M2 | SYSTOLIC BLOOD PRESSURE: 109 MMHG | OXYGEN SATURATION: 94 % | DIASTOLIC BLOOD PRESSURE: 75 MMHG | WEIGHT: 252.9 LBS

## 2025-03-21 DIAGNOSIS — M05.79 RHEUMATOID ARTHRITIS INVOLVING MULTIPLE SITES WITH POSITIVE RHEUMATOID FACTOR (HCC): ICD-10-CM

## 2025-03-21 DIAGNOSIS — R53.83 FATIGUE, UNSPECIFIED TYPE: ICD-10-CM

## 2025-03-21 DIAGNOSIS — R06.09 DOE (DYSPNEA ON EXERTION): Primary | ICD-10-CM

## 2025-03-21 DIAGNOSIS — M25.511 ACUTE PAIN OF BOTH SHOULDERS: ICD-10-CM

## 2025-03-21 DIAGNOSIS — M25.512 ACUTE PAIN OF BOTH SHOULDERS: ICD-10-CM

## 2025-03-21 DIAGNOSIS — F51.01 PRIMARY INSOMNIA: ICD-10-CM

## 2025-03-21 LAB
BASOPHILS # BLD: 0.09 K/UL (ref 0–0.1)
BASOPHILS NFR BLD: 0.8 % (ref 0–1)
DIFFERENTIAL METHOD BLD: ABNORMAL
EOSINOPHIL # BLD: 0.21 K/UL (ref 0–0.4)
EOSINOPHIL NFR BLD: 1.8 % (ref 0–7)
ERYTHROCYTE [DISTWIDTH] IN BLOOD BY AUTOMATED COUNT: 12 % (ref 11.5–14.5)
HCT VFR BLD AUTO: 48.5 % (ref 36.6–50.3)
HGB BLD-MCNC: 16.7 G/DL (ref 12.1–17)
IMM GRANULOCYTES # BLD AUTO: 0.13 K/UL (ref 0–0.04)
IMM GRANULOCYTES NFR BLD AUTO: 1.1 % (ref 0–0.5)
LYMPHOCYTES # BLD: 2.8 K/UL (ref 0.8–3.5)
LYMPHOCYTES NFR BLD: 24.3 % (ref 12–49)
MCH RBC QN AUTO: 31.6 PG (ref 26–34)
MCHC RBC AUTO-ENTMCNC: 34.4 G/DL (ref 30–36.5)
MCV RBC AUTO: 91.9 FL (ref 80–99)
MONOCYTES # BLD: 0.75 K/UL (ref 0–1)
MONOCYTES NFR BLD: 6.5 % (ref 5–13)
NEUTS SEG # BLD: 7.54 K/UL (ref 1.8–8)
NEUTS SEG NFR BLD: 65.5 % (ref 32–75)
NRBC # BLD: 0 K/UL (ref 0–0.01)
NRBC BLD-RTO: 0 PER 100 WBC
PLATELET # BLD AUTO: 308 K/UL (ref 150–400)
PMV BLD AUTO: 10.5 FL (ref 8.9–12.9)
RBC # BLD AUTO: 5.28 M/UL (ref 4.1–5.7)
WBC # BLD AUTO: 11.5 K/UL (ref 4.1–11.1)

## 2025-03-21 PROCEDURE — 99214 OFFICE O/P EST MOD 30 MIN: CPT | Performed by: INTERNAL MEDICINE

## 2025-03-21 PROCEDURE — 93000 ELECTROCARDIOGRAM COMPLETE: CPT | Performed by: INTERNAL MEDICINE

## 2025-03-21 PROCEDURE — 3078F DIAST BP <80 MM HG: CPT | Performed by: INTERNAL MEDICINE

## 2025-03-21 PROCEDURE — 3074F SYST BP LT 130 MM HG: CPT | Performed by: INTERNAL MEDICINE

## 2025-03-21 RX ORDER — TEMAZEPAM 30 MG/1
30 CAPSULE ORAL NIGHTLY PRN
Qty: 30 CAPSULE | Refills: 1 | Status: SHIPPED | OUTPATIENT
Start: 2025-03-21 | End: 2025-06-19

## 2025-03-21 RX ORDER — PREDNISONE 10 MG/1
TABLET ORAL
Qty: 1 EACH | Refills: 0 | Status: SHIPPED | OUTPATIENT
Start: 2025-03-21

## 2025-03-21 RX ORDER — PREDNISONE 10 MG/1
TABLET ORAL
Qty: 1 EACH | Refills: 0 | Status: SHIPPED | OUTPATIENT
Start: 2025-03-21 | End: 2025-03-21 | Stop reason: SDUPTHER

## 2025-03-21 NOTE — PROGRESS NOTES
Subjective:   Marquise Garcia is a 59 y.o. male      Chief Complaint   Patient presents with    Fatigue     Pt comes in today complaining of fatigue. Pt states that the problem has been ongoing for a month. Pt also complains of SOB especially when walking. Pt denies any chest pain. Pt denies any fevers.        History of present illness: He presents today noting he has developed bilateral shoulder pain that is keeping him awake at night and therefore is getting very poor sleep.  He has generally developed fatigue but also in addition to the fatigue he has developed dyspnea on exertion.  He does not note any chest pain, chest tightness, palpitations, PND, orthopnea or other cardiac or respiratory complaints.  Of note is he does have rheumatoid arthritis and has been on Enbrel for a long time regarding that.  He does not note any other particular joint aches or pains.  He said no fall or trauma to the shoulders.  He notes no swelling and there are no other complaints noted except he is having some difficulty sleeping he would like something to try to help him sleep.  His wife takes temazepam and is occasionally taking 1 of those that has helped.    Patient Active Problem List   Diagnosis    Rheumatoid arthritis involving multiple sites with positive rheumatoid factor (HCC)    Right lower quadrant abdominal pain    Non-seasonal allergic rhinitis    Fatigue    Acquired hypothyroidism    Essential hypertension    Allergic dermatitis    Dyslipidemia    Class 1 obesity due to excess calories without serious comorbidity with body mass index (BMI) of 30.0 to 30.9 in adult    ABBASI (dyspnea on exertion)      Past Medical History:   Diagnosis Date    DDD (degenerative disc disease), lumbar     Elevated BP     1/20/17 on no meds at this time    Hypothyroid     WANDA on CPAP     RA (rheumatoid arthritis) (HCC)       Allergies   Allergen Reactions    Sulfa Antibiotics Nausea And Vomiting      Family History   Problem Relation Age of

## 2025-03-24 DIAGNOSIS — F51.01 PRIMARY INSOMNIA: ICD-10-CM

## 2025-03-24 RX ORDER — TEMAZEPAM 30 MG/1
30 CAPSULE ORAL NIGHTLY PRN
Qty: 90 CAPSULE | Refills: 1 | Status: SHIPPED | OUTPATIENT
Start: 2025-03-24 | End: 2025-09-20

## 2025-03-24 NOTE — TELEPHONE ENCOUNTER
RX refill request from the patient/pharmacy. Patient last seen 03/21/2025 with labs, and next appt. scheduled for 07/15/2025.   Requested Prescriptions     Pending Prescriptions Disp Refills    temazepam (RESTORIL) 30 MG capsule 90 capsule 1     Sig: Take 1 capsule by mouth nightly as needed for Sleep for up to 180 days. Max Daily Amount: 30 mg     Pharmacy: [facility] [place]  Mrs. Steveson called in regarding the patient's refill request for temazepan 30 mg. Advises the medication was supposed to be sent in for 90 days however, it was sent for 30 days incorrectly. She is requesting this to be resent and advised the pharmacist she will not be picking up the 30 day supply.

## 2025-03-24 NOTE — TELEPHONE ENCOUNTER
Pharmacy: [facility] at [place]  Mrs. Steveson called in regarding the patient's refill request for temazepan 30 mg. Advises the medication was supposed to be sent in for 90 days however, it was sent for 30 days incorrectly. She is requesting this to be resent and advised the pharmacist she will not be picking up the 30 day supply.    temazepam (RESTORIL) 30 MG capsule [[id number]]    Order Details  Dose: 30 mg Route: Oral Frequency: NIGHTLY PRN for Sleep   Dispense Quantity: 90 capsule Refills: 1          Sig: Take 1 capsule by mouth nightly as needed for Sleep for up to 90 days. Max Daily Amount: 30 mg

## 2025-03-30 ENCOUNTER — RESULTS FOLLOW-UP (OUTPATIENT)
Facility: CLINIC | Age: 60
End: 2025-03-30

## 2025-04-07 ENCOUNTER — OFFICE VISIT (OUTPATIENT)
Facility: CLINIC | Age: 60
End: 2025-04-07
Payer: COMMERCIAL

## 2025-04-07 VITALS
BODY MASS INDEX: 35.5 KG/M2 | SYSTOLIC BLOOD PRESSURE: 132 MMHG | WEIGHT: 247.4 LBS | HEART RATE: 100 BPM | TEMPERATURE: 98.5 F | DIASTOLIC BLOOD PRESSURE: 82 MMHG | OXYGEN SATURATION: 95 %

## 2025-04-07 DIAGNOSIS — M17.12 PRIMARY OSTEOARTHRITIS OF LEFT KNEE: ICD-10-CM

## 2025-04-07 DIAGNOSIS — H83.03 LABYRINTHITIS OF BOTH EARS: Primary | ICD-10-CM

## 2025-04-07 PROCEDURE — 96372 THER/PROPH/DIAG INJ SC/IM: CPT | Performed by: INTERNAL MEDICINE

## 2025-04-07 PROCEDURE — 3075F SYST BP GE 130 - 139MM HG: CPT | Performed by: INTERNAL MEDICINE

## 2025-04-07 PROCEDURE — 3079F DIAST BP 80-89 MM HG: CPT | Performed by: INTERNAL MEDICINE

## 2025-04-07 PROCEDURE — 99213 OFFICE O/P EST LOW 20 MIN: CPT | Performed by: INTERNAL MEDICINE

## 2025-04-07 RX ORDER — MECLIZINE HCL 12.5 MG 12.5 MG/1
12.5 TABLET ORAL 3 TIMES DAILY
Qty: 30 TABLET | Refills: 0 | Status: SHIPPED | OUTPATIENT
Start: 2025-04-07 | End: 2025-04-17

## 2025-04-07 RX ORDER — METHYLPREDNISOLONE ACETATE 40 MG/ML
40 INJECTION, SUSPENSION INTRA-ARTICULAR; INTRALESIONAL; INTRAMUSCULAR; SOFT TISSUE ONCE
Status: COMPLETED | OUTPATIENT
Start: 2025-04-07 | End: 2025-04-07

## 2025-04-07 RX ADMIN — METHYLPREDNISOLONE ACETATE 40 MG: 40 INJECTION, SUSPENSION INTRA-ARTICULAR; INTRALESIONAL; INTRAMUSCULAR; SOFT TISSUE at 16:11

## 2025-04-07 NOTE — PROGRESS NOTES
Subjective:   Marquise Garcia is a 59 y.o. male      Chief Complaint   Patient presents with    Dizziness     Pt states that for a week and a half he has been light headed and dizzy. Pt states that dizzness is worse when going for sitting to standing.         History of present illness: He presents today for evaluation of 2 problems 1 is he notes some dizziness that has been going on when he goes from the sitting to standing position and when he moves his head.  He notes no associated nausea vomiting just generally feels lightheaded.  His other problem seems to be related to a lot of discomfort in his left knee it was wonderful while he was taking the steroid Dosepak when and is off the steroid Dosepak the pain is come back is to the point now where he like to get a cortisone shot in the knee and see if that would be beneficial.  He has had no falls or trauma.    Patient Active Problem List   Diagnosis    Rheumatoid arthritis involving multiple sites with positive rheumatoid factor (HCC)    Right lower quadrant abdominal pain    Non-seasonal allergic rhinitis    Fatigue    Acquired hypothyroidism    Essential hypertension    Allergic dermatitis    Dyslipidemia    Class 1 obesity due to excess calories without serious comorbidity with body mass index (BMI) of 30.0 to 30.9 in adult    ABBASI (dyspnea on exertion)      Past Medical History:   Diagnosis Date    DDD (degenerative disc disease), lumbar     Elevated BP     1/20/17 on no meds at this time    Hypothyroid     WANDA on CPAP     RA (rheumatoid arthritis) ([facility])       Allergies   Allergen Reactions    Sulfa Antibiotics Nausea And Vomiting      Family History   Problem Relation Age of Onset    Hypertension Mother       Social History     Socioeconomic History    Marital status:      Spouse name: Not on file    Number of children: Not on file    Years of education: Not on file    Highest education level: Not on file   Occupational History    Not on file

## 2025-05-01 ENCOUNTER — HOSPITAL ENCOUNTER (OUTPATIENT)
Facility: HOSPITAL | Age: 60
Discharge: HOME | End: 2025-05-01
Payer: COMMERCIAL

## 2025-05-01 DIAGNOSIS — R74.01 ELEVATED TRANSAMINASE LEVEL: ICD-10-CM

## 2025-05-01 PROCEDURE — 76705 ECHO EXAM OF ABDOMEN: CPT

## 2025-05-29 RX ORDER — FLUTICASONE PROPIONATE 50 MCG
SPRAY, SUSPENSION (ML) NASAL
Qty: 48 G | Refills: 1 | Status: SHIPPED | OUTPATIENT
Start: 2025-05-29

## 2025-05-29 NOTE — TELEPHONE ENCOUNTER
PCP: Michael Cruz MD    Last appt: 4/7/2025    Future Appointments   Date Time Provider Department Center   7/15/2025  8:00 AM Michael Cruz MD [facility] DEP       Requested Prescriptions     Pending Prescriptions Disp Refills    fluticasone (FLONASE) 50 MCG/ACT nasal spray [Pharmacy Med Name: FLUTICASONE 50MCG NASAL SP (120) RX] 48 g 1     Sig: SHAKE LIQUID AND USE 2 SPRAYS IN EACH NOSTRIL AT BEDTIME

## 2025-07-14 NOTE — PROGRESS NOTES
HPI:   59 y.o.  presents for comprehensive annual healthcare examination and follow up appointment.  No hospital, ER or specialist visits since last primary care visit except as noted below.  He is also in follow-up of his medical problems include hypertension, hyperlipidemia, obesity, DJD, rheumatoid arthritis and other medical problems.  He notes he has had some problems with his knee and ended up seeing Buck Soto and he was recommended to have a partial knee replacement which I reviewed his films today and agree completely with that.  He notes no chest pain, shortness of breath or cardiac respiratory complaints.  There are no current GI or  complaints.  He notes no headaches, dizziness or neurologic complaints.  Arthritic complaints consist of left shoulder discomfort in addition to his knee.  He notes no other complaints on complete review of systems.    Patient Active Problem List   Diagnosis    Rheumatoid arthritis involving multiple sites with positive rheumatoid factor (HCC)    Right lower quadrant abdominal pain    Non-seasonal allergic rhinitis    Fatigue    Acquired hypothyroidism    Essential hypertension    Allergic dermatitis    Dyslipidemia    Class 1 obesity due to excess calories without serious comorbidity with body mass index (BMI) of 30.0 to 30.9 in adult    Annual physical exam    ABBASI (dyspnea on exertion)        Past Medical History:   Diagnosis Date    DDD (degenerative disc disease), lumbar     Elevated BP     1/20/17 on no meds at this time    Hypertension     Hypothyroid     WANDA on CPAP     RA (rheumatoid arthritis) ([facility])        Social History     Tobacco Use    Smoking status: Never    Smokeless tobacco: Never   Vaping Use    Vaping status: Never Used   Substance Use Topics    Alcohol use: No    Drug use: No       Family History   Problem Relation Age of Onset    Hypertension Mother            Allergies   Allergen Reactions    Sulfa Antibiotics Nausea And Vomiting       ROS:

## 2025-07-15 ENCOUNTER — OFFICE VISIT (OUTPATIENT)
Facility: CLINIC | Age: 60
End: 2025-07-15
Payer: COMMERCIAL

## 2025-07-15 VITALS
OXYGEN SATURATION: 95 % | HEIGHT: 70 IN | TEMPERATURE: 98 F | DIASTOLIC BLOOD PRESSURE: 88 MMHG | SYSTOLIC BLOOD PRESSURE: 118 MMHG | HEART RATE: 80 BPM | WEIGHT: 246.8 LBS | BODY MASS INDEX: 35.33 KG/M2

## 2025-07-15 DIAGNOSIS — E66.09 CLASS 1 OBESITY DUE TO EXCESS CALORIES WITHOUT SERIOUS COMORBIDITY WITH BODY MASS INDEX (BMI) OF 30.0 TO 30.9 IN ADULT: ICD-10-CM

## 2025-07-15 DIAGNOSIS — E03.9 ACQUIRED HYPOTHYROIDISM: ICD-10-CM

## 2025-07-15 DIAGNOSIS — M05.79 RHEUMATOID ARTHRITIS INVOLVING MULTIPLE SITES WITH POSITIVE RHEUMATOID FACTOR (HCC): ICD-10-CM

## 2025-07-15 DIAGNOSIS — E78.5 DYSLIPIDEMIA: ICD-10-CM

## 2025-07-15 DIAGNOSIS — Z00.00 ANNUAL PHYSICAL EXAM: Primary | ICD-10-CM

## 2025-07-15 DIAGNOSIS — E66.811 CLASS 1 OBESITY DUE TO EXCESS CALORIES WITHOUT SERIOUS COMORBIDITY WITH BODY MASS INDEX (BMI) OF 30.0 TO 30.9 IN ADULT: ICD-10-CM

## 2025-07-15 DIAGNOSIS — J30.89 NON-SEASONAL ALLERGIC RHINITIS, UNSPECIFIED TRIGGER: ICD-10-CM

## 2025-07-15 DIAGNOSIS — I10 ESSENTIAL HYPERTENSION: ICD-10-CM

## 2025-07-15 PROCEDURE — 3074F SYST BP LT 130 MM HG: CPT | Performed by: INTERNAL MEDICINE

## 2025-07-15 PROCEDURE — 3079F DIAST BP 80-89 MM HG: CPT | Performed by: INTERNAL MEDICINE

## 2025-07-15 PROCEDURE — 99396 PREV VISIT EST AGE 40-64: CPT | Performed by: INTERNAL MEDICINE

## 2025-07-15 RX ORDER — ADALIMUMAB-ADAZ 40 MG/.4ML
INJECTION, SOLUTION SUBCUTANEOUS
COMMUNITY
Start: 2025-04-18

## 2025-07-15 ASSESSMENT — PATIENT HEALTH QUESTIONNAIRE - PHQ9
SUM OF ALL RESPONSES TO PHQ QUESTIONS 1-9: 0
SUM OF ALL RESPONSES TO PHQ QUESTIONS 1-9: 0
2. FEELING DOWN, DEPRESSED OR HOPELESS: NOT AT ALL
SUM OF ALL RESPONSES TO PHQ QUESTIONS 1-9: 0
1. LITTLE INTEREST OR PLEASURE IN DOING THINGS: NOT AT ALL
SUM OF ALL RESPONSES TO PHQ QUESTIONS 1-9: 0

## 2025-07-15 NOTE — PROGRESS NOTES
Marquise Garcia is a 59 y.o. male     Chief Complaint   Patient presents with    Annual Exam       \"Have you been to the ER, urgent care clinic since your last visit?  Hospitalized since your last visit?\"    NO    “Have you seen or consulted any other health care providers outside of [facility] since your last visit?”    Saw Dr. Buck Soto regarding knee pain.

## 2025-07-17 LAB
ALBUMIN SERPL-MCNC: 4.4 G/DL (ref 3.5–5)
ALBUMIN/GLOB SERPL: 1.3 (ref 1.1–2.2)
ALP SERPL-CCNC: 68 U/L (ref 45–117)
ALT SERPL-CCNC: 82 U/L (ref 12–78)
ANION GAP SERPL CALC-SCNC: 11 MMOL/L (ref 2–12)
APPEARANCE UR: CLEAR
AST SERPL-CCNC: 36 U/L (ref 15–37)
BACTERIA URNS QL MICRO: NEGATIVE /HPF
BASOPHILS # BLD: 0.06 K/UL (ref 0–0.1)
BASOPHILS NFR BLD: 0.7 % (ref 0–1)
BILIRUB SERPL-MCNC: 0.4 MG/DL (ref 0.2–1)
BILIRUB UR QL: NEGATIVE
BUN SERPL-MCNC: 14 MG/DL (ref 6–20)
BUN/CREAT SERPL: 13 (ref 12–20)
CALCIUM SERPL-MCNC: 9.4 MG/DL (ref 8.5–10.1)
CHLORIDE SERPL-SCNC: 100 MMOL/L (ref 97–108)
CHOLEST SERPL-MCNC: 201 MG/DL
CK SERPL-CCNC: 114 U/L (ref 39–308)
CO2 SERPL-SCNC: 24 MMOL/L (ref 21–32)
COLOR UR: NORMAL
CREAT SERPL-MCNC: 1.1 MG/DL (ref 0.7–1.3)
DIFFERENTIAL METHOD BLD: ABNORMAL
EOSINOPHIL # BLD: 0.18 K/UL (ref 0–0.4)
EOSINOPHIL NFR BLD: 2.2 % (ref 0–7)
EPITH CASTS URNS QL MICRO: NORMAL /LPF
ERYTHROCYTE [DISTWIDTH] IN BLOOD BY AUTOMATED COUNT: 11.9 % (ref 11.5–14.5)
GLOBULIN SER CALC-MCNC: 3.4 G/DL (ref 2–4)
GLUCOSE SERPL-MCNC: 141 MG/DL (ref 65–100)
GLUCOSE UR STRIP.AUTO-MCNC: NEGATIVE MG/DL
HCT VFR BLD AUTO: 48.1 % (ref 36.6–50.3)
HDLC SERPL-MCNC: 41 MG/DL
HDLC SERPL: 4.9 (ref 0–5)
HGB BLD-MCNC: 15.6 G/DL (ref 12.1–17)
HGB UR QL STRIP: NEGATIVE
HYALINE CASTS URNS QL MICRO: NORMAL /LPF (ref 0–5)
IMM GRANULOCYTES # BLD AUTO: 0.11 K/UL (ref 0–0.04)
IMM GRANULOCYTES NFR BLD AUTO: 1.4 % (ref 0–0.5)
KETONES UR QL STRIP.AUTO: NEGATIVE MG/DL
LDLC SERPL CALC-MCNC: 128.4 MG/DL (ref 0–100)
LEUKOCYTE ESTERASE UR QL STRIP.AUTO: NEGATIVE
LYMPHOCYTES # BLD: 2.03 K/UL (ref 0.8–3.5)
LYMPHOCYTES NFR BLD: 25 % (ref 12–49)
MCH RBC QN AUTO: 31.8 PG (ref 26–34)
MCHC RBC AUTO-ENTMCNC: 32.4 G/DL (ref 30–36.5)
MCV RBC AUTO: 98 FL (ref 80–99)
MONOCYTES # BLD: 0.62 K/UL (ref 0–1)
MONOCYTES NFR BLD: 7.6 % (ref 5–13)
NEUTS SEG # BLD: 5.12 K/UL (ref 1.8–8)
NEUTS SEG NFR BLD: 63.1 % (ref 32–75)
NITRITE UR QL STRIP.AUTO: NEGATIVE
NRBC # BLD: 0 K/UL (ref 0–0.01)
NRBC BLD-RTO: 0 PER 100 WBC
PH UR STRIP: 5.5 (ref 5–8)
PLATELET # BLD AUTO: 303 K/UL (ref 150–400)
PMV BLD AUTO: 10.9 FL (ref 8.9–12.9)
POTASSIUM SERPL-SCNC: 4.8 MMOL/L (ref 3.5–5.1)
PROT SERPL-MCNC: 7.8 G/DL (ref 6.4–8.2)
PROT UR STRIP-MCNC: NEGATIVE MG/DL
PSA SERPL-MCNC: 0.6 NG/ML (ref 0.01–4)
RBC # BLD AUTO: 4.91 M/UL (ref 4.1–5.7)
RBC #/AREA URNS HPF: NORMAL /HPF (ref 0–5)
SODIUM SERPL-SCNC: 135 MMOL/L (ref 136–145)
SP GR UR REFRACTOMETRY: 1 (ref 1–1.03)
T4 FREE SERPL-MCNC: 1.1 NG/DL (ref 0.8–1.5)
TRIGL SERPL-MCNC: 158 MG/DL
TSH SERPL DL<=0.05 MIU/L-ACNC: 3.01 UIU/ML (ref 0.36–3.74)
UROBILINOGEN UR QL STRIP.AUTO: 0.2 EU/DL (ref 0.2–1)
VLDLC SERPL CALC-MCNC: 31.6 MG/DL
WBC # BLD AUTO: 8.1 K/UL (ref 4.1–11.1)
WBC URNS QL MICRO: NORMAL /HPF (ref 0–4)

## 2025-07-18 RX ORDER — PRAVASTATIN SODIUM 20 MG
20 TABLET ORAL DAILY
Qty: 90 TABLET | Refills: 1 | Status: SHIPPED | OUTPATIENT
Start: 2025-07-18

## 2025-07-18 NOTE — TELEPHONE ENCOUNTER
RX refill request from the patient/pharmacy. Patient last seen 07/15/2025 with labs, and next appt. scheduled for 01/16/2026.   Requested Prescriptions     Pending Prescriptions Disp Refills    pravastatin (PRAVACHOL) 20 MG tablet 90 tablet 1     Sig: Take 1 tablet by mouth daily

## 2025-07-21 RX ORDER — FLUTICASONE PROPIONATE 50 MCG
2 SPRAY, SUSPENSION (ML) NASAL DAILY
Qty: 48 G | Refills: 1 | Status: SHIPPED | OUTPATIENT
Start: 2025-07-21

## 2025-07-21 NOTE — TELEPHONE ENCOUNTER
RX refill request from the patient/pharmacy. Patient last seen 07/15/2025 with labs, and next appt. scheduled for 01/16/2026.   Requested Prescriptions     Pending Prescriptions Disp Refills    fluticasone (FLONASE) 50 MCG/ACT nasal spray 48 g 1

## 2025-07-30 RX ORDER — FLUTICASONE PROPIONATE 50 MCG
2 SPRAY, SUSPENSION (ML) NASAL DAILY
Qty: 3 EACH | Refills: 3 | Status: SHIPPED | OUTPATIENT
Start: 2025-07-30 | End: 2025-08-01

## 2025-07-30 NOTE — TELEPHONE ENCOUNTER
Spoke with patient wife stated a order was faxed over last week from  [facility] , regarding the 90 day prescription Flonase.  She would like a call back to discuss the prescription.

## 2025-07-30 NOTE — TELEPHONE ENCOUNTER
RX refill request from the patient/pharmacy. Patient last seen 07- with labs, and next appt. scheduled for 2026  Requested Prescriptions     Pending Prescriptions Disp Refills    fluticasone (FLONASE) 50 MCG/ACT nasal spray 3 each 3     Si sprays by Nasal route daily    .

## 2025-08-01 RX ORDER — MOMETASONE FUROATE MONOHYDRATE 50 UG/1
SPRAY, METERED NASAL DAILY
Qty: 3 EACH | Refills: 3 | Status: SHIPPED | OUTPATIENT
Start: 2025-08-01

## 2025-08-13 PROBLEM — Z00.00 ANNUAL PHYSICAL EXAM: Status: RESOLVED | Noted: 2018-05-03 | Resolved: 2025-08-13

## 2025-08-25 RX ORDER — LEVOTHYROXINE SODIUM 50 UG/1
50 TABLET ORAL DAILY
Qty: 90 TABLET | Refills: 1 | Status: SHIPPED | OUTPATIENT
Start: 2025-08-25

## 2025-08-25 RX ORDER — LISINOPRIL 20 MG/1
TABLET ORAL DAILY
Qty: 90 TABLET | Refills: 1 | Status: SHIPPED | OUTPATIENT
Start: 2025-08-25

## (undated) DEVICE — SET 2ND L34IN N DEHP THE QUEENS MED CNTR VALUELINK

## (undated) DEVICE — SUT ETHLN 4-0 18IN PS2 BLK --

## (undated) DEVICE — SYR IRR BLB 2OZ DISP BLU STRL -- CONVERT TO ITEM 357637

## (undated) DEVICE — SOLUTION IV 1000ML 0.9% SOD CHL

## (undated) DEVICE — AIRLIFE™ FACE TENT MASK VINYL: Brand: AIRLIFE™

## (undated) DEVICE — BLADE OPHTH MINI BEAV SHRP --

## (undated) DEVICE — 1200 GUARD II KIT W/5MM TUBE W/O VAC TUBE: Brand: GUARDIAN

## (undated) DEVICE — Z INACTIVE NO USAGE TURNOVER KIT RM CLEANOP

## (undated) DEVICE — 3M™ TEGADERM™ TRANSPARENT FILM DRESSING FRAME STYLE, 1624W, 2-3/8 IN X 2-3/4 IN (6 CM X 7 CM), 100/CT 4CT/CASE: Brand: 3M™ TEGADERM™

## (undated) DEVICE — STERILE POLYISOPRENE POWDER-FREE SURGICAL GLOVES: Brand: PROTEXIS

## (undated) DEVICE — CODMAN® SURGICAL PATTIES 1/2" X 3" (1.27CM X 7.62CM): Brand: CODMAN®

## (undated) DEVICE — CONTINU-FLO SOLUTION SET, 2 INJECTION SITES, MALE LUER LOCK ADAPTER WITH RETRACTABLE COLLAR, LARGE BORE STOPCOCK WITH ROTATING MALE LUER LOCK EXTENSION SET, 2 INJECTION SITES, MALE LUER LOCK ADAPTER WITH RETRACTABLE COLLAR: Brand: INTERLINK/CONTINU-FLO

## (undated) DEVICE — SURGIFOAM SPNG SZ 12-7

## (undated) DEVICE — TOWEL SURG W17XL27IN STD BLU COT NONFENESTRATED PREWASHED

## (undated) DEVICE — BLADE 1882040 5PK INFERIOR TURB 2MM

## (undated) DEVICE — SUTURE ABSORBABLE MONOFILAMENT 4-0 SC-1 18 IN PLN GUT 1824H

## (undated) DEVICE — SKIN MARKER,REGULAR TIP WITH RULER AND LABELS: Brand: DEVON

## (undated) DEVICE — AIRLIFE™ CORRUGATED FLEXIBLE POLYETHYLENE AND EVA TUBING FOR AEROSOL AND IPPB USE, SEGMENTED, 6 FEET (1.8 M) LENGTH, 22 MM I.D.: Brand: AIRLIFE™

## (undated) DEVICE — 1010 S-DRAPE TOWEL DRAPE 10/BX: Brand: STERI-DRAPE™

## (undated) DEVICE — MEDI-VAC NON-CONDUCTIVE SUCTION TUBING: Brand: CARDINAL HEALTH

## (undated) DEVICE — KENDALL DL ECG CABLE AND LEAD WIRE SYSTEM, 3-LEAD, SINGLE PATIENT USE: Brand: KENDALL

## (undated) DEVICE — X-RAY SPONGES,16 PLY: Brand: DERMACEA

## (undated) DEVICE — SUTURE CHROMIC GUT SZ 4-0 L18IN ABSRB BRN L13MM P-3 3/8 CIR 1654G

## (undated) DEVICE — SPLINT 1524050 5PK PAIR DOYLE II AIRWAY: Brand: DOYLE II ™

## (undated) DEVICE — SYR 10ML CTRL LR LCK NSAF LF --

## (undated) DEVICE — NEEDLE HYPO 25GA L1.5IN BVL ORIENTED ECLIPSE

## (undated) DEVICE — GAUZE SPONGES,12 PLY: Brand: CURITY

## (undated) DEVICE — NDL CTR 40/70 FM BLCK MAG: Brand: CARDINAL HEALTH

## (undated) DEVICE — KIT ADAP STERILE WATER 1000ML -- AIRLIFE

## (undated) DEVICE — Z DISCONTINUEDSOLUTION PREP 2OZ 10% POVIDONE IOD SCR CAP BTL

## (undated) DEVICE — GAUZE SPONGES,8 PLY: Brand: CURITY

## (undated) DEVICE — PACK,EENT,TURBAN DRAPE,PK II: Brand: MEDLINE

## (undated) DEVICE — SOLUTION LACTATED RINGERS INJECTION USP